# Patient Record
Sex: FEMALE | Race: WHITE | Employment: PART TIME | URBAN - METROPOLITAN AREA
[De-identification: names, ages, dates, MRNs, and addresses within clinical notes are randomized per-mention and may not be internally consistent; named-entity substitution may affect disease eponyms.]

---

## 2017-02-20 RX ORDER — BUTALBITAL, ACETAMINOPHEN AND CAFFEINE 50; 325; 40 MG/1; MG/1; MG/1
TABLET ORAL
Qty: 30 TAB | Refills: 0 | Status: SHIPPED | OUTPATIENT
Start: 2017-02-20 | End: 2017-03-14

## 2017-03-14 ENCOUNTER — OFFICE VISIT (OUTPATIENT)
Dept: NEUROLOGY | Age: 62
End: 2017-03-14

## 2017-03-14 VITALS
HEART RATE: 78 BPM | OXYGEN SATURATION: 98 % | BODY MASS INDEX: 34.93 KG/M2 | SYSTOLIC BLOOD PRESSURE: 110 MMHG | HEIGHT: 61 IN | WEIGHT: 185 LBS | DIASTOLIC BLOOD PRESSURE: 70 MMHG

## 2017-03-14 DIAGNOSIS — G43.719 INTRACTABLE CHRONIC MIGRAINE WITHOUT AURA AND WITHOUT STATUS MIGRAINOSUS: Primary | ICD-10-CM

## 2017-03-14 DIAGNOSIS — G40.219 PARTIAL EPILEPSY WITH IMPAIRMENT OF CONSCIOUSNESS, INTRACTABLE (HCC): ICD-10-CM

## 2017-03-14 RX ORDER — FLUTICASONE PROPIONATE 50 MCG
2 SPRAY, SUSPENSION (ML) NASAL DAILY
COMMUNITY
End: 2018-10-24

## 2017-03-14 RX ORDER — NORTRIPTYLINE HYDROCHLORIDE 25 MG/1
CAPSULE ORAL
Qty: 60 CAP | Refills: 1 | Status: SHIPPED | OUTPATIENT
Start: 2017-03-14 | End: 2017-07-11

## 2017-03-14 RX ORDER — DIVALPROEX SODIUM 250 MG/1
TABLET, DELAYED RELEASE ORAL
Qty: 150 TAB | Refills: 1 | Status: SHIPPED | OUTPATIENT
Start: 2017-03-14 | End: 2017-05-25 | Stop reason: SDUPTHER

## 2017-03-14 RX ORDER — BUTALBITAL, ACETAMINOPHEN AND CAFFEINE 50; 325; 40 MG/1; MG/1; MG/1
TABLET ORAL
Qty: 40 TAB | Refills: 1 | Status: SHIPPED | OUTPATIENT
Start: 2017-03-14 | End: 2017-07-11

## 2017-03-14 NOTE — MR AVS SNAPSHOT
Visit Information Date & Time Provider Department Dept. Phone Encounter #  
 3/14/2017 11:00 AM Paz Squires MD [facility] [phone] [id number] Upcoming Health Maintenance Date Due Hepatitis C Screening 1955 FOOT EXAM Q1 1/15/1964 MICROALBUMIN Q1 1/15/1964 EYE EXAM RETINAL OR DILATED Q1 1/15/1964 Pneumococcal 19-64 Medium Risk (1 of 1 - PPSV23) 1/15/1973 DTaP/Tdap/Td series (1 - Tdap) 1/15/1975 BREAST CANCER SCRN MAMMOGRAM 1/15/2004 ZOSTER VACCINE AGE 60> 1/15/2014 HEMOGLOBIN A1C Q6M 11/28/2014 LIPID PANEL Q1 1/16/2015 FOBT Q 1 YEAR AGE 50-75 1/21/2015 INFLUENZA AGE 9 TO ADULT 8/1/2016 PAP AKA CERVICAL CYTOLOGY 1/6/2017 Allergies as of 3/14/2017  Review Complete On: 3/14/2017 By: Sonia Gutierrez LPN Severity Noted Reaction Type Reactions Flexall [Menthol-aloe Vera Extract]  05/28/2014    Other (comments) Sudafed [Pseudoephedrine Hcl]  04/13/2011    Unknown (comments) Current Immunizations  Never Reviewed No immunizations on file. Not reviewed this visit You Were Diagnosed With   
  
 Codes Comments Intractable chronic migraine without aura and without status migrainosus    -  Primary ICD-10-CM: L70.021 ICD-9-CM: 346.71 Partial epilepsy with impairment of consciousness, intractable ([facility] 75.)     ICD-10-CM: V89.810 ICD-9-CM: 345.41 Vitals BP Pulse Height(growth percentile) Weight(growth percentile) SpO2 BMI  
 110/70 78 5' 1\" (1.549 m) 185 lb (83.9 kg) 98% 34.96 kg/m2 OB Status Smoking Status Postmenopausal Never Smoker Vitals History BMI and BSA Data Body Mass Index Body Surface Area 34.96 kg/m 2 1.9 m 2 Preferred Pharmacy Pharmacy Name Phone [facility] [address] [place] [address], [address] [place] [address] [phone] Your Updated Medication List  
  
   
 This list is accurate as of: 3/14/17 12:55 PM.  Always use your most recent med list.  
  
  
  
  
 ACTOS 15 mg tablet Generic drug:  pioglitazone Take  by mouth. 10 MG BID BENADRYL ALLERGY 25 mg tablet Generic drug:  diphenhydrAMINE Take 25 mg by mouth every six (6) hours as needed. butalbital-acetaminophen-caffeine -40 mg per tablet Commonly known as:  FIORICET, ESGIC  
30 day Rx. No early refill. Take 1-2 tabs at onset of migraine. May repeat one tab in 4 hours if needed. Limit: 3 days a week. CRESTOR PO Take  by mouth. divalproex  mg tablet Commonly known as:  DEPAKOTE  
TAKE 3 TABLETS BY MOUTH EVERY MORNING & 3 TABLETS EVERY EVENING  
  
 FLONASE 50 mcg/actuation nasal spray Generic drug:  fluticasone 2 Sprays by Both Nostrils route daily. gabapentin 300 mg capsule Commonly known as:  NEURONTIN Take 300 mg by mouth nightly. glipiZIDE 5 mg tablet Commonly known as:  [place] Take 10 mg by mouth two (2) times a day. HYDROcodone-acetaminophen 7.5-325 mg per tablet Commonly known as:  Vinny Songster Take  by mouth. JANUMET 50-1,000 mg per tablet Generic drug:  SITagliptin-metFORMIN Take 1 Tab by mouth two (2) times daily (with meals). nortriptyline 25 mg capsule Commonly known as:  PAMELOR Take 1 to 2 capsules at bedtime. Antidepressant for headache prevention and to help insomnia. PAXIL 20 mg tablet Generic drug:  PARoxetine Take  by mouth daily. * tiZANidine 4 mg tablet Commonly known as:  Marisela Mail Take 1 Tab by mouth three (3) times daily as needed. Indications: MUSCLE SPASM * tiZANidine 4 mg tablet Commonly known as:  Marisela Mail TAKE 1 TABLET BY MOUTH 3 TIMES A DAY FOR MUSCLE SPASMS  
  
 VITAMIN D3 1,000 unit tablet Generic drug:  cholecalciferol Take  by mouth daily. XANAX PO Take  by mouth. * Notice:   This list has 2 medication(s) that are the same as other medications prescribed for you. Read the directions carefully, and ask your doctor or other care provider to review them with you. Prescriptions Printed Refills  
 butalbital-acetaminophen-caffeine (FIORICET, ESGIC) -40 mg per tablet 1 Si day Rx. No early refill. Take 1-2 tabs at onset of migraine. May repeat one tab in 4 hours if needed. Limit: 3 days a week. Class: Print Prescriptions Sent to Pharmacy Refills  
 nortriptyline (PAMELOR) 25 mg capsule 1 Sig: Take 1 to 2 capsules at bedtime. Antidepressant for headache prevention and to help insomnia. Class: Normal  
 Pharmacy: [facility] [address] [place], [place] [address]y [address] [place] [address] E Ph #: [phone]  
 divalproex DR (DEPAKOTE) 250 mg tablet 1 Sig: TAKE 3 TABLETS BY MOUTH EVERY MORNING & 3 TABLETS EVERY EVENING Class: Normal  
 Pharmacy: [facility] [address] [place], [place] [address]y [address] [place] [address] E Ph #: [phone] Introducing [facility]! [facility] introduces [employer] patient portal. Now you can access parts of your medical record, email your doctor's office, and request medication refills online. 1. In your internet browser, go to [email or web address] 2. Click on the First Time User? Click Here link in the Sign In box. You will see the New Member Sign Up page. 3. Enter your [employer] Access Code exactly as it appears below. You will not need to use this code after youve completed the sign-up process. If you do not sign up before the expiration date, you must request a new code. · [employer] Access Code: [email or web address] Expires: 2017 12:55 PM 
 
4. Enter the last four digits of your Social Security Number (xxxx) and Date of Birth (mm/dd/yyyy) as indicated and click Submit. You will be taken to the next sign-up page. 5. Create a [employer] ID. This will be your [employer] login ID and cannot be changed, so think of one that is secure and easy to remember. 6. Create a [employer] password. You can change your password at any time. 7. Enter your Password Reset Question and Answer. This can be used at a later time if you forget your password. 8. Enter your e-mail address. You will receive e-mail notification when new information is available in [address] 9. Click Sign Up. You can now view and download portions of your medical record. 10. Click the Download Summary menu link to download a portable copy of your medical information. If you have questions, please visit the Frequently Asked Questions section of the [employer] website. Remember, [employer] is NOT to be used for urgent needs. For medical emergencies, dial 911. Now available from your iPhone and Android! Please provide this summary of care documentation to your next provider. Your primary care clinician is listed as Kim Fong. If you have any questions after today's visit, please call [phone].

## 2017-03-14 NOTE — PROGRESS NOTES
Interval HPI:   This is a 60 y.o. female who is following up for     Chief Complaint   Patient presents with    Seizure     a couple of months ago    Migraine     New patient to me. Complicated visit. Pt has been seeing Gonzalo Villarreal NP for what looks like more than a year but says she they have a personality conflict and she wants to go back to seeing Dr. Elvis Luque. He is not here today so I'm am seeing in his place. .      Patient previously followed by Gonzalo Villarreal NP for migraine, epilepsy, and muscle spasms and last seen by her in May of 2016 with same complaints. At that visit continued on Zanaflex for her muscle spasms and indirectly it improved her sleep. She has a self reported hx of seizures and at that visit she noted having had 3 \"small seizures\" since the prior visit in Nov 2015. They discussed increasing her Depakote to 1000 mg BID but patient declined due to concern of SEFx from depakote. Kept Depakote at 750 mg BID. Lastly, pt was complaining about frequent headaches and it was determined she was overusing her Fioricet and maybe some OTC analgesics as well. She was counseled to stop all OTC headache pain relievers, limit her Fioricet to 3 days a week, and started on Topamax (goal of 100 mg QHS) for both headache prevention and secondly to reduce chance of seizure. Today she reports that her last seizure was a few weeks ago, and describes it as being a \"small seizure. \"  She describes that seizure as waking up one morning, feeling  woozy, pounding in head, and flashing lights in her vision, feeling shaky in her arms (sugars not checked, is diabetic). Prior to that, the last seizure was 3 months before. She recalls that her  was driving and they were going through a wooded area and the lights were flickering through the trees so she had to close her eyes because it was bothering her.  is here and says he didn't see or notice any seizure-like activity.   She describes other episodes where she gets a pounding sensation inside her head and considers this a manifestation of seizure, but no actual/ associated jerking.  recalls one remote seizure where she was \"flopping like a fish. \"     Brief ROS: as above or otherwise negative  There have been no significant changes in PMHx, PSHx, SHx except as noted above. Allergies   Allergen Reactions    Flexall [Menthol-Aloe Vera Extract] Other (comments)    Sudafed [Pseudoephedrine Hcl] Unknown (comments)     Current Outpatient Prescriptions   Medication Sig Dispense Refill    fluticasone (FLONASE) 50 mcg/actuation nasal spray 2 Sprays by Both Nostrils route daily.  butalbital-acetaminophen-caffeine (FIORICET, ESGIC) -40 mg per tablet 30 day Rx. No early refill. Take 1-2 tabs at onset of migraine. May repeat one tab in 4 hours if needed. Limit: 3 days a week. 40 Tab 1    nortriptyline (PAMELOR) 25 mg capsule Take 1 to 2 capsules at bedtime. Antidepressant for headache prevention and to help insomnia. 60 Cap 1    divalproex DR (DEPAKOTE) 250 mg tablet TAKE 3 TABLETS BY MOUTH EVERY MORNING & 3 TABLETS EVERY EVENING 150 Tab 1    tiZANidine (ZANAFLEX) 4 mg tablet TAKE 1 TABLET BY MOUTH 3 TIMES A DAY FOR MUSCLE SPASMS 30 Tab 3    tiZANidine (ZANAFLEX) 4 mg tablet Take 1 Tab by mouth three (3) times daily as needed. Indications: MUSCLE SPASM 30 Tab 3    ROSUVASTATIN CALCIUM (CRESTOR PO) Take  by mouth.  PARoxetine (PAXIL) 20 mg tablet Take  by mouth daily.  ALPRAZOLAM (XANAX PO) Take  by mouth.  HYDROcodone-acetaminophen (NORCO) 7.5-325 mg per tablet Take  by mouth.  gabapentin (NEURONTIN) 300 mg capsule Take 300 mg by mouth nightly.  diphenhydrAMINE (BENADRYL ALLERGY) 25 mg tablet Take 25 mg by mouth every six (6) hours as needed.  sitaGLIPtin-metFORMIN (JANUMET) 50-1,000 mg per tablet Take 1 Tab by mouth two (2) times daily (with meals).         pioglitazone (ACTOS) 15 mg tablet Take by mouth. 10 MG BID       glipiZIDE (GLUCOTROL) 5 mg tablet Take 10 mg by mouth two (2) times a day.  cholecalciferol, vitamin d3, (VITAMIN D) 1,000 unit tablet Take  by mouth daily. Physical Exam  Blood pressure 110/70, pulse 78, height 5' 1\" (1.549 m), weight 83.9 kg (185 lb), SpO2 98 %. No acute distress  Neck: no stiffness  Skin: no rashes    Focused Neurological Exam     Mental status: Alert and oriented to person, place situation. Language: normal fluency and comprehension; no dysarthria. CNs:   Visual fields grossly normal  Extraocular movements intact, no nystagmus  Face appears symmetric and facial strength normal.    Hearing is intact to casual conversation. Sensory: intact light touch both arms  Motor: Normal bulk and strength in all 4 extremities. Reflexes: not tested  Gait: not observed    Impression      ICD-10-CM ICD-9-CM    1. Intractable chronic migraine without aura and without status migrainosus G43.719 346.71 butalbital-acetaminophen-caffeine (FIORICET, ESGIC) -40 mg per tablet      nortriptyline (PAMELOR) 25 mg capsule      divalproex DR (DEPAKOTE) 250 mg tablet   2. Partial epilepsy with impairment of consciousness, intractable (HCC) G40.219 345.41        Renewed Depakote 750 mg BID and Fioricet Rx (#40, 1 RF) with enough to last patient until she follows up with Dr. Dimitris Rod. Added Nortriptyline to see if it reduces overall number of headaches. Discussed common SEFx with patient and . Discussed with patient/  that the things she described as seizures, don't sound like seizure to me at all. They sound variably like migraine with aura with other episodes of photo-sensitivity (without any associated seizure). Patient recalls being told something in the past by one of her doctors that some of her seizure-like episodes may be stress related, though it's not clear whether she ever had a seizure while being recorded with EEG.  She had a routine EEG in 3-2012, read by Dr. Mikhail Zayas as being normal awake, drowsy, asleep EEG. He suggested that if she continued having spells, she should have have a sleep deprived EEG, 24 hour ambulatory EEG, or EMU admission to evaluate for the nature of her spells. Patient will follow up with Dr. Mikhail Zayas in about 6 weeks and further discuss evaluation of spells, and her headaches. Spent more than 50% of this visit reviewing history, clarifying symptomatology, and discussing treatment plans with patient and .

## 2017-04-25 RX ORDER — TIZANIDINE 4 MG/1
TABLET ORAL
Qty: 30 TAB | Refills: 0 | Status: SHIPPED | OUTPATIENT
Start: 2017-04-25 | End: 2017-05-02

## 2017-05-02 ENCOUNTER — OFFICE VISIT (OUTPATIENT)
Dept: NEUROLOGY | Age: 62
End: 2017-05-02

## 2017-05-02 VITALS
RESPIRATION RATE: 15 BRPM | HEIGHT: 61 IN | DIASTOLIC BLOOD PRESSURE: 78 MMHG | SYSTOLIC BLOOD PRESSURE: 120 MMHG | OXYGEN SATURATION: 98 % | TEMPERATURE: 98 F | HEART RATE: 70 BPM

## 2017-05-02 DIAGNOSIS — G43.019 INTRACTABLE MIGRAINE WITHOUT AURA AND WITHOUT STATUS MIGRAINOSUS: ICD-10-CM

## 2017-05-02 DIAGNOSIS — T39.95XA ANALGESIC OVERUSE HEADACHE: Primary | ICD-10-CM

## 2017-05-02 DIAGNOSIS — G44.40 ANALGESIC OVERUSE HEADACHE: Primary | ICD-10-CM

## 2017-05-02 NOTE — MR AVS SNAPSHOT
Visit Information Date & Time Provider Department Dept. Phone Encounter #  
 5/2/2017  3:00 PM Nica Hodgson MD [address] [place] [address] Neurology Clinic [phone] [id number] Follow-up Instructions Return in about 9 weeks (around 7/4/2017). Upcoming Health Maintenance Date Due Hepatitis C Screening 1955 FOOT EXAM Q1 1/15/1964 MICROALBUMIN Q1 1/15/1964 EYE EXAM RETINAL OR DILATED Q1 1/15/1964 Pneumococcal 19-64 Medium Risk (1 of 1 - PPSV23) 1/15/1973 DTaP/Tdap/Td series (1 - Tdap) 1/15/1975 BREAST CANCER SCRN MAMMOGRAM 1/15/2004 ZOSTER VACCINE AGE 60> 1/15/2014 HEMOGLOBIN A1C Q6M 11/28/2014 LIPID PANEL Q1 1/16/2015 FOBT Q 1 YEAR AGE 50-75 1/21/2015 PAP AKA CERVICAL CYTOLOGY 1/6/2017 INFLUENZA AGE 9 TO ADULT 8/1/2017 Allergies as of 5/2/2017  Review Complete On: 5/2/2017 By: Chaparrita Polk LPN Severity Noted Reaction Type Reactions Flexall [Menthol-aloe Vera Extract]  05/28/2014    Other (comments) Sudafed [Pseudoephedrine Hcl]  04/13/2011    Unknown (comments) Current Immunizations  Never Reviewed No immunizations on file. Not reviewed this visit Vitals BP Pulse Temp Resp Height(growth percentile) SpO2  
 120/78 70 98 °F (36.7 °C) 15 5' 1\" (1.549 m) 98% OB Status Smoking Status Postmenopausal Never Smoker Preferred Pharmacy Pharmacy Name Phone [facility] [address] [place] [address], [address] [place] [address] [phone] Your Updated Medication List  
  
   
This list is accurate as of: 5/2/17  3:48 PM.  Always use your most recent med list.  
  
  
  
  
 ACTOS 15 mg tablet Generic drug:  pioglitazone Take  by mouth. 10 MG BID BENADRYL ALLERGY 25 mg tablet Generic drug:  diphenhydrAMINE Take 25 mg by mouth every six (6) hours as needed. butalbital-acetaminophen-caffeine -40 mg per tablet Commonly known as:  FIORICET, ESGIC  
30 day Rx. No early refill. Take 1-2 tabs at onset of migraine. May repeat one tab in 4 hours if needed. Limit: 3 days a week. CRESTOR PO Take  by mouth. divalproex  mg tablet Commonly known as:  DEPAKOTE  
TAKE 3 TABLETS BY MOUTH EVERY MORNING & 3 TABLETS EVERY EVENING  
  
 FLONASE 50 mcg/actuation nasal spray Generic drug:  fluticasone 2 Sprays by Both Nostrils route daily. gabapentin 300 mg capsule Commonly known as:  NEURONTIN Take 300 mg by mouth nightly. glipiZIDE 5 mg tablet Commonly known as:  Lakshmi Mulch Take 10 mg by mouth two (2) times a day. HYDROcodone-acetaminophen 7.5-325 mg per tablet Commonly known as:  Olamide Proper Take  by mouth. JANUMET 50-1,000 mg per tablet Generic drug:  SITagliptin-metFORMIN Take 1 Tab by mouth two (2) times daily (with meals). nortriptyline 25 mg capsule Commonly known as:  PAMELOR Take 1 to 2 capsules at bedtime. Antidepressant for headache prevention and to help insomnia. PAXIL 20 mg tablet Generic drug:  PARoxetine Take  by mouth daily. tiZANidine 4 mg tablet Commonly known as:  Pedro Heading Take 1 Tab by mouth three (3) times daily as needed. Indications: MUSCLE SPASM  
  
 VITAMIN D3 1,000 unit tablet Generic drug:  cholecalciferol Take  by mouth daily. XANAX PO Take  by mouth. Follow-up Instructions Return in about 9 weeks (around 7/4/2017). Patient Instructions Information Regarding Testing If you have physican order for a test or a medication denied by your insurance company, this does not mean the test or medication is not appropriate for you as that is a medical decision, not a decision to be made by an insurance company representative or by an [facility] physician who has not interviewed and examined you. This is a decision to be made between you and your physician. The denial of services is a contractual matter between you and your insurance company, not an issue between your physician and the insurance company. If your test or medication is denied, you can take the following steps to help resolve the issue: 1. File a complaint with the [facility] of Insurance regarding your insurance company's denial of services ordered for you. You can do this either by calling them directly or by completing an on-line complaint form on the [employer]. This can be found at [email or web address] 2. Also file a formal complaint with your insurance company and ask to have the name of the person denying the service so that you may explore a legal option should you be harmed by this denial of service. Again, the fact the insurance company will not pay for the service does not mean it is not medically necessary and I would encourage you to follow through with the plan that was made with your physician 3. File a written complaint with your employer so your employer and benefit manager is aware of the poor coverage they are providing their employees. If you have medicare/medicaid, complain to your representative in the House and to your Jose Pollock. PRESCRIPTION REFILL POLICY [facility] Statement to Patients April 1, 2014 In an effort to ensure the large volume of patient prescription refills is processed in the most efficient and expeditious manner, we are asking our patients to assist us by calling your Pharmacy for all prescription refills, this will include also your  Mail Order Pharmacy. The pharmacy will contact our office electronically to continue the refill process. Please do not wait until the last minute to call your pharmacy. We need at least 48 hours (2days) to fill prescriptions. We also encourage you to call your pharmacy before going to  your prescription to make sure it is ready. With regard to controlled substance prescription refill requests (narcotic refills) that need to be picked up at our office, we ask your cooperation by providing us with at least 72 hours (3days) notice that you will need a refill. We will not refill narcotic prescription refill requests after 4:00pm on any weekday, Monday through Thursday, or after 2:00pm on Fridays, or on the weekends. We encourage everyone to explore another way of getting your prescription refill request processed using [facility], our patient web portal through our electronic medical record system. [facility] is an efficient and effective way to communicate your medication request directly to the office and  downloadable as an otf on your smart phone . [facility] also features a review functionality that allows you to view your medication list as well as leave messages for your physician. Are you ready to get connected? If so please review the attatched instructions or speak to any of our staff to get you set up right away! Thank you so much for your cooperation. Should you have any questions please contact our Practice Administrator. The Physicians and Staff,  [facility] If we have ordered testing for you, we do not call patients with results and we do not give test results over the phone. We schedule follow up appointments so that your results can be discussed in person and any questions you have regarding them may be addressed. If something of concern is revealed on your test, we will call you for a sooner follow up appointment. Additionally, results may be found by using the My Chart feature and one of our patient service representatives at the  can give you instructions on how to access this feature of our electronic medical record system. Jonathan Lion [address] What is a living will?  
A living will is a legal form you use to write down the kind of care you want at the end of your life. It is used by the health professionals who will treat you if you aren't able to decide for yourself. If you put your wishes in writing, your loved ones and others will know what kind of care you want. They won't need to guess. This can ease your mind and be helpful to others. A living will is not the same as an estate or property will. An estate will explains what you want to happen with your money and property after you die. Is a living will a legal document? A living will is a legal document. Each state has its own laws about living grewal. If you move to another state, make sure that your living will is legal in the state where you now live. Or you might use a universal form that has been approved by many states. This kind of form can sometimes be completed and stored online. Your electronic copy will then be available wherever you have a connection to the Internet. In most cases, doctors will respect your wishes even if you have a form from a different state. · You don't need an  to complete a living will. But legal advice can be helpful if your state's laws are unclear, your health history is complicated, or your family can't agree on what should be in your living will. · You can change your living will at any time. Some people find that their wishes about end-of-life care change as their health changes. · In addition to making a living will, think about completing a medical power of  form. This form lets you name the person you want to make end-of-life treatment decisions for you (your \"health care agent\") if you're not able to. Many hospitals and nursing homes will give you the forms you need to complete a living will and a medical power of . · Your living will is used only if you can't make or communicate decisions for yourself anymore.  If you become able to make decisions again, you can accept or refuse any treatment, no matter what you wrote in your living will. · Your state may offer an online registry. This is a place where you can store your living will online so the doctors and nurses who need to treat you can find it right away. What should you think about when creating a living will? Talk about your end-of-life wishes with your family members and your doctor. Let them know what you want. That way the people making decisions for you won't be surprised by your choices. Think about these questions as you make your living will: · Do you know enough about life support methods that might be used? If not, talk to your doctor so you know what might be done if you can't breathe on your own, your heart stops, or you're unable to swallow. · What things would you still want to be able to do after you receive life-support methods? Would you want to be able to walk? To speak? To eat on your own? To live without the help of machines? · If you have a choice, where do you want to be cared for? In your home? At a hospital or nursing home? · Do you want certain [facility] practices performed if you become very ill? · If you have a choice at the end of your life, where would you prefer to die? At home? In a hospital or nursing home? Somewhere else? · Would you prefer to be buried or cremated? · Do you want your organs to be donated after you die? What should you do with your living will? · Make sure that your family members and your health care agent have copies of your living will. · Give your doctor a copy of your living will to keep in your medical record. If you have more than one doctor, make sure that each one has a copy. · You may want to put a copy of your living will where it can be easily found. Where can you learn more? Go to http://soren-surjit.info/. Enter G547 in the search box to learn more about \"Learning About Living Perroy. \" 
 Current as of: February 24, 2016 Content Version: 11.2 © 3241-2805 [employer]. Care instructions adapted under license by [employer] (which disclaims liability or warranty for this information). If you have questions about a medical condition or this instruction, always ask your healthcare professional. [address] any warranty or liability for your use of this information. Do not use any analgesic medication to treat the headache for 8 weeks. Your headaches are likely to get worse before they get better. Continue using the Depakote and start taking the nortriptyline. Take your medications at the same time each day. Follow-up in 9 weeks. Introducing [facility]! Sapna Ayala introduces [facility] patient portal. Now you can access parts of your medical record, email your doctor's office, and request medication refills online. 1. In your internet browser, go to [email or web address] 2. Click on the First Time User? Click Here link in the Sign In box. You will see the New Member Sign Up page. 3. Enter your [facility] Access Code exactly as it appears below. You will not need to use this code after youve completed the sign-up process. If you do not sign up before the expiration date, you must request a new code. · [facility] Access Code: [email or web address] Expires: 6/12/2017 12:55 PM 
 
4. Enter the last four digits of your Social Security Number (xxxx) and Date of Birth (mm/dd/yyyy) as indicated and click Submit. You will be taken to the next sign-up page. 5. Create a Naikut ID. This will be your [facility]it login ID and cannot be changed, so think of one that is secure and easy to remember. 6. Create a [facility] password. You can change your password at any time. 7. Enter your Password Reset Question and Answer. This can be used at a later time if you forget your password. 8. Enter your e-mail address. You will receive e-mail notification when new information is available in [address] 9. Click Sign Up. You can now view and download portions of your medical record. 10. Click the Download Summary menu link to download a portable copy of your medical information. If you have questions, please visit the Frequently Asked Questions section of the [facility] website. Remember, [facility] is NOT to be used for urgent needs. For medical emergencies, dial 911. Now available from your iPhone and Android! Please provide this summary of care documentation to your next provider. Your primary care clinician is listed as Lexi Melchor. If you have any questions after today's visit, please call [phone].

## 2017-05-02 NOTE — PROGRESS NOTES
[facility]. [address]   [address], [address] [place] [address] Drive    [facility], [place]    Fax               Chief Complaint   Patient presents with    Migraine     follow up     Current Outpatient Prescriptions   Medication Sig Dispense Refill    fluticasone (FLONASE) 50 mcg/actuation nasal spray 2 Sprays by Both Nostrils route daily.  butalbital-acetaminophen-caffeine (FIORICET, ESGIC) -40 mg per tablet 30 day Rx. No early refill. Take 1-2 tabs at onset of migraine. May repeat one tab in 4 hours if needed. Limit: 3 days a week. 40 Tab 1    nortriptyline (PAMELOR) 25 mg capsule Take 1 to 2 capsules at bedtime. Antidepressant for headache prevention and to help insomnia. 60 Cap 1    divalproex DR (DEPAKOTE) 250 mg tablet TAKE 3 TABLETS BY MOUTH EVERY MORNING & 3 TABLETS EVERY EVENING 150 Tab 1    tiZANidine (ZANAFLEX) 4 mg tablet Take 1 Tab by mouth three (3) times daily as needed. Indications: MUSCLE SPASM 30 Tab 3    ROSUVASTATIN CALCIUM (CRESTOR PO) Take  by mouth.  PARoxetine (PAXIL) 20 mg tablet Take  by mouth daily.  ALPRAZOLAM (XANAX PO) Take  by mouth.  HYDROcodone-acetaminophen (NORCO) 7.5-325 mg per tablet Take  by mouth.  gabapentin (NEURONTIN) 300 mg capsule Take 300 mg by mouth nightly.  diphenhydrAMINE (BENADRYL ALLERGY) 25 mg tablet Take 25 mg by mouth every six (6) hours as needed.  sitaGLIPtin-metFORMIN (JANUMET) 50-1,000 mg per tablet Take 1 Tab by mouth two (2) times daily (with meals).  pioglitazone (ACTOS) 15 mg tablet Take  by mouth. 10 MG BID       glipiZIDE (GLUCOTROL) 5 mg tablet Take 10 mg by mouth two (2) times a day.  cholecalciferol, vitamin d3, (VITAMIN D) 1,000 unit tablet Take  by mouth daily.  tiZANidine (ZANAFLEX) 4 mg tablet TAKE 1 TABLET THREE TIMES DAILY AS NEEDED FOR MUSCLE SPASMS 30 Tab 0    medications corrected in the electronic medical record.   She is not taking Zanaflex. Allergies   Allergen Reactions    Flexall [Menthol-Aloe Vera Extract] Other (comments)    Sudafed [Pseudoephedrine Hcl] Unknown (comments)     Social History   Substance Use Topics    Smoking status: Never Smoker    Smokeless tobacco: Never Used    Alcohol use No     Patient returns today for follow-up migraines as well as spells of the been billed seizure. I have reviewed Dr. Breann Raza note where he saw her back in March. He started Pamelor for headaches. She continues on the Depakote 750 mg twice daily. She is also using Fioricet. She is overusing analgesics. We discussed this today at length. Examination  Visit Vitals    /78    Pulse 70    Temp 98 °F (36.7 °C)    Resp 15    Ht 5' 1\" (1.549 m)    SpO2 98%     Awake, alert and oriented. No icterus. CN intact 2-12 without nystagmus. No pronation or drift. Resists fully in all 4 extrems. DTR symmetric in all 4 extremities. No ataxia. Steady gait. Impression/Plan  Underlying migraine headache now with analgesic overuse headache. Discussed this at length. Discussed she needs to stop all analgesic medications whether prescribed or over-the-counter that includes the Fioricet. Her headaches will get worse before they get better. He can take 8-10 weeks for this cycle to break. Continue Depakote. Continue Pamelor. Follow-up in about 10 weeks. This note was created using voice recognition software. Despite editing, there may be syntax errors. This note will not be viewable in [address]

## 2017-05-02 NOTE — PATIENT INSTRUCTIONS
Information Regarding Testing     If you have physican order for a test or a medication denied by your insurance company, this does not mean the test or medication is not appropriate for you as that is a medical decision, not a decision to be made by an insurance company representative or by an [facility] physician who has not interviewed and examined you. This is a decision to be made between you and your physician. The denial of services is a contractual matter between you and your insurance company, not an issue between your physician and the insurance company. If your test or medication is denied, you can take the following steps to help resolve the issue:    1. File a complaint with the [facility] regarding your insurance company's denial of services ordered for you. You can do this either by calling them directly or by completing an on-line complaint form on the [employer]. This can be found at www.[email or web address]    2. Also file a formal complaint with your insurance company and ask to have the name of the person denying the service so that you may explore a legal option should you be harmed by this denial of service. Again, the fact the insurance company will not pay for the service does not mean it is not medically necessary and I would encourage you to follow through with the plan that was made with your physician    3. File a written complaint with your employer so your employer and benefit manager is aware of the poor coverage they are providing their employees. If you have medicare/medicaid, complain to your representative in the House and to your Jose Pollock.         [facility]   Statement to Patients  April 1, 2014      In an effort to ensure the large volume of patient prescription refills is processed in the most efficient and expeditious manner, we are asking our patients to assist us by calling your Pharmacy for all prescription refills, this will include also your  Mail Order Pharmacy. The pharmacy will contact our office electronically to continue the refill process. Please do not wait until the last minute to call your pharmacy. We need at least 48 hours (2days) to fill prescriptions. We also encourage you to call your pharmacy before going to  your prescription to make sure it is ready. With regard to controlled substance prescription refill requests (narcotic refills) that need to be picked up at our office, we ask your cooperation by providing us with at least 72 hours (3days) notice that you will need a refill. We will not refill narcotic prescription refill requests after 4:00pm on any weekday, Monday through Thursday, or after 2:00pm on Fridays, or on the weekends. We encourage everyone to explore another way of getting your prescription refill request processed using [employer], our patient web portal through our electronic medical record system. [employer] is an efficient and effective way to communicate your medication request directly to the office and  downloadable as an otf on your smart phone . [employer] also features a review functionality that allows you to view your medication list as well as leave messages for your physician. Are you ready to get connected? If so please review the attatched instructions or speak to any of our staff to get you set up right away! Thank you so much for your cooperation. Should you have any questions please contact our Practice Administrator. The Physicians and Staff,  [facility]       If we have ordered testing for you, we do not call patients with results and we do not give test results over the phone. We schedule follow up appointments so that your results can be discussed in person and any questions you have regarding them may be addressed.   If something of concern is revealed on your test, we will call you for a sooner follow up appointment. Additionally, results may be found by using the My Chart feature and one of our patient service representatives at the  can give you instructions on how to access this feature of our electronic medical record system. Learning About Chris Ballard  What is a living will? A living will is a legal form you use to write down the kind of care you want at the end of your life. It is used by the health professionals who will treat you if you aren't able to decide for yourself. If you put your wishes in writing, your loved ones and others will know what kind of care you want. They won't need to guess. This can ease your mind and be helpful to others. A living will is not the same as an estate or property will. An estate will explains what you want to happen with your money and property after you die. Is a living will a legal document? A living will is a legal document. Each state has its own laws about living grewal. If you move to another state, make sure that your living will is legal in the state where you now live. Or you might use a universal form that has been approved by many states. This kind of form can sometimes be completed and stored online. Your electronic copy will then be available wherever you have a connection to the Internet. In most cases, doctors will respect your wishes even if you have a form from a different state. · You don't need an  to complete a living will. But legal advice can be helpful if your state's laws are unclear, your health history is complicated, or your family can't agree on what should be in your living will. · You can change your living will at any time. Some people find that their wishes about end-of-life care change as their health changes. · In addition to making a living will, think about completing a medical power of  form.  This form lets you name the person you want to make end-of-life treatment decisions for you (your \"health care agent\") if you're not able to. Many hospitals and nursing homes will give you the forms you need to complete a living will and a medical power of . · Your living will is used only if you can't make or communicate decisions for yourself anymore. If you become able to make decisions again, you can accept or refuse any treatment, no matter what you wrote in your living will. · Your state may offer an online registry. This is a place where you can store your living will online so the doctors and nurses who need to treat you can find it right away. What should you think about when creating a living will? Talk about your end-of-life wishes with your family members and your doctor. Let them know what you want. That way the people making decisions for you won't be surprised by your choices. Think about these questions as you make your living will:  · Do you know enough about life support methods that might be used? If not, talk to your doctor so you know what might be done if you can't breathe on your own, your heart stops, or you're unable to swallow. · What things would you still want to be able to do after you receive life-support methods? Would you want to be able to walk? To speak? To eat on your own? To live without the help of machines? · If you have a choice, where do you want to be cared for? In your home? At a hospital or nursing home? · Do you want certain [employer] practices performed if you become very ill? · If you have a choice at the end of your life, where would you prefer to die? At home? In a hospital or nursing home? Somewhere else? · Would you prefer to be buried or cremated? · Do you want your organs to be donated after you die? What should you do with your living will? · Make sure that your family members and your health care agent have copies of your living will. · Give your doctor a copy of your living will to keep in your medical record.  If you have more than one doctor, make sure that each one has a copy. · You may want to put a copy of your living will where it can be easily found. Where can you learn more? Go to http://soren-surjit.info/. Enter C184 in the search box to learn more about \"Learning About Living Perrokatharine. \"  Current as of: February 24, 2016  Content Version: 11.2  © 9997-9326 [employer]. Care instructions adapted under license by [employer] (which disclaims liability or warranty for this information). If you have questions about a medical condition or this instruction, always ask your healthcare professional. [address] any warranty or liability for your use of this information. Do not use any analgesic medication to treat the headache for 8 weeks. Your headaches are likely to get worse before they get better. Continue using the Depakote and start taking the nortriptyline. Take your medications at the same time each day. Follow-up in 9 weeks.

## 2017-05-25 DIAGNOSIS — G43.719 INTRACTABLE CHRONIC MIGRAINE WITHOUT AURA AND WITHOUT STATUS MIGRAINOSUS: ICD-10-CM

## 2017-05-26 RX ORDER — DIVALPROEX SODIUM 250 MG/1
TABLET, DELAYED RELEASE ORAL
Qty: 150 TAB | Refills: 1 | Status: SHIPPED | OUTPATIENT
Start: 2017-05-26 | End: 2017-09-13 | Stop reason: SDUPTHER

## 2017-05-31 ENCOUNTER — TELEPHONE (OUTPATIENT)
Dept: NEUROLOGY | Age: 62
End: 2017-05-31

## 2017-05-31 DIAGNOSIS — G43.809 CERVICOGENIC MIGRAINE: ICD-10-CM

## 2017-05-31 DIAGNOSIS — M62.838 MUSCLE SPASM: ICD-10-CM

## 2017-05-31 NOTE — TELEPHONE ENCOUNTER
----- Message from [facility] sent at 5/31/2017 10:23 AM EDT -----  Regarding: Dr. Jenny Hoover  Pt requesting a refill on Rx \"Tizanidine\" (4mg). The medication states no refills, authorization required. Pt uses [facility] ([phone]). Pt best contact [phone] or [phone].

## 2017-06-01 RX ORDER — TIZANIDINE 4 MG/1
4 TABLET ORAL
Qty: 30 TAB | Refills: 3 | Status: SHIPPED | OUTPATIENT
Start: 2017-06-01 | End: 2017-10-24 | Stop reason: SDUPTHER

## 2017-07-11 ENCOUNTER — OFFICE VISIT (OUTPATIENT)
Dept: NEUROLOGY | Age: 62
End: 2017-07-11

## 2017-07-11 VITALS
OXYGEN SATURATION: 97 % | HEART RATE: 80 BPM | HEIGHT: 61 IN | DIASTOLIC BLOOD PRESSURE: 76 MMHG | RESPIRATION RATE: 18 BRPM | WEIGHT: 176 LBS | SYSTOLIC BLOOD PRESSURE: 118 MMHG | BODY MASS INDEX: 33.23 KG/M2 | TEMPERATURE: 98.6 F

## 2017-07-11 DIAGNOSIS — G43.019 INTRACTABLE MIGRAINE WITHOUT AURA AND WITHOUT STATUS MIGRAINOSUS: Primary | ICD-10-CM

## 2017-07-11 DIAGNOSIS — G40.219 PARTIAL EPILEPSY WITH IMPAIRMENT OF CONSCIOUSNESS, INTRACTABLE (HCC): ICD-10-CM

## 2017-07-11 NOTE — PROGRESS NOTES
Follow up for migraine and seizure. reports one seizure since last visit. Reports 5-8 headache days per months lasting 4 hours or more. Patient seeing pain management for neck pain from fall.

## 2017-07-11 NOTE — PATIENT INSTRUCTIONS
Start taking magnesium oxide 400 mg tablets 1 tablet twice daily  Start taking vitamin B2 400mg daily

## 2017-07-11 NOTE — PROGRESS NOTES
[facility]   [place] [address]   [address]    [facility]   [phone] Fax               Chief Complaint   Patient presents with    Migraine     follow up     Current Outpatient Prescriptions   Medication Sig Dispense Refill    tiZANidine (ZANAFLEX) 4 mg tablet Take 1 Tab by mouth three (3) times daily as needed. Indications: MUSCLE SPASM 30 Tab 3    divalproex DR (DEPAKOTE) 250 mg tablet TAKE 3 TABLETS BY MOUTH EVERY MORNING & 3 TABLETS EVERY EVENING 150 Tab 1    fluticasone (FLONASE) 50 mcg/actuation nasal spray 2 Sprays by Both Nostrils route daily.  ROSUVASTATIN CALCIUM (CRESTOR PO) Take  by mouth.  PARoxetine (PAXIL) 20 mg tablet Take  by mouth daily.  ALPRAZOLAM (XANAX PO) Take  by mouth.  gabapentin (NEURONTIN) 300 mg capsule Take 300 mg by mouth nightly.  diphenhydrAMINE (BENADRYL ALLERGY) 25 mg tablet Take 25 mg by mouth every six (6) hours as needed.  sitaGLIPtin-metFORMIN (JANUMET) 50-1,000 mg per tablet Take 1 Tab by mouth two (2) times daily (with meals).  pioglitazone (ACTOS) 15 mg tablet Take  by mouth. 10 MG BID       glipiZIDE (GLUCOTROL) 5 mg tablet Take 10 mg by mouth two (2) times a day.  cholecalciferol, vitamin d3, (VITAMIN D) 1,000 unit tablet Take  by mouth daily.  HYDROcodone-acetaminophen (NORCO) 7.5-325 mg per tablet Take  by mouth. Allergies   Allergen Reactions    Flexall [Menthol-Aloe Vera Extract] Other (comments)    Sudafed [Pseudoephedrine Hcl] Unknown (comments)     Social History   Substance Use Topics    Smoking status: Never Smoker    Smokeless tobacco: Never Used    Alcohol use No     Patient returns today for follow-up. She is for chronic headaches as well as episodes billed seizure. She has chronic neck pain and is being seen by pain management.   Last time I saw her she was having daily headaches, analgesic overuse and she was advised to stop all analgesic medications. She is off all analgesics now. She is reporting Patrick Noblia 1122 May, 5 in June and 2 in July thus far. She maintains compliance with her Depakote. No seizures. No occult seizure symptom. Endorses no side effects from the Depakote. Examination  Visit Vitals    /76    Pulse 80    Temp 98.6 °F (37 °C)    Resp 18    Ht 5' 1\" (1.549 m)    Wt 79.8 kg (176 lb)    SpO2 97%    BMI 33.25 kg/m2     Awake alert oriented. Normal speech and language. No icterus. No edema. No nystagmus. No pronation or drift. Impression/Plan  Analgesic overuse headache with underlying migraine  Stay of analgesics to prevent her getting back into analgesic overuse cycling  Mag oxide 400 mg twice daily as well as vitamin B2 for headache prevention  Continue to track headaches  Continue to work with pain management for her neck pain    Continue Depakote    Follow-up in about 3 months        This note was created using voice recognition software. Despite editing, there may be syntax errors. This note will not be viewable in [address]

## 2017-07-11 NOTE — MR AVS SNAPSHOT
Visit Information Date & Time Provider Department Dept. Phone Encounter #  
 7/11/2017  2:00 PM Miguelangel Muñoz MD [address] [place] [address] Neurology Clinic [phone] [id number] Follow-up Instructions Return in about 3 months (around 10/11/2017). Upcoming Health Maintenance Date Due Hepatitis C Screening 1955 FOOT EXAM Q1 1/15/1964 MICROALBUMIN Q1 1/15/1964 EYE EXAM RETINAL OR DILATED Q1 1/15/1964 Pneumococcal 19-64 Medium Risk (1 of 1 - PPSV23) 1/15/1973 DTaP/Tdap/Td series (1 - Tdap) 1/15/1975 BREAST CANCER SCRN MAMMOGRAM 1/15/2004 ZOSTER VACCINE AGE 60> 1/15/2014 HEMOGLOBIN A1C Q6M 11/28/2014 LIPID PANEL Q1 1/16/2015 FOBT Q 1 YEAR AGE 50-75 1/21/2015 PAP AKA CERVICAL CYTOLOGY 1/6/2017 INFLUENZA AGE 9 TO ADULT 8/1/2017 Allergies as of 7/11/2017  Review Complete On: 7/11/2017 By: Miguelangel Muñoz MD  
  
 Severity Noted Reaction Type Reactions Flexall [Menthol-aloe Vera Extract]  05/28/2014    Other (comments) Sudafed [Pseudoephedrine Hcl]  04/13/2011    Unknown (comments) Current Immunizations  Never Reviewed No immunizations on file. Not reviewed this visit Vitals BP Pulse Temp Resp Height(growth percentile) Weight(growth percentile) 118/76 80 98.6 °F (37 °C) 18 5' 1\" (1.549 m) 176 lb (79.8 kg) SpO2 BMI OB Status Smoking Status 97% 33.25 kg/m2 Postmenopausal Never Smoker Vitals History BMI and BSA Data Body Mass Index Body Surface Area  
 33.25 kg/m 2 1.85 m 2 Preferred Pharmacy Pharmacy Name Phone [facility] [address] [place] [address], [address] [place] [address] [phone] Your Updated Medication List  
  
   
This list is accurate as of: 7/11/17  3:52 PM.  Always use your most recent med list.  
  
  
  
  
 ACTOS 15 mg tablet Generic drug:  pioglitazone Take  by mouth. 10 MG BID BENADRYL ALLERGY 25 mg tablet Generic drug:  diphenhydrAMINE Take 25 mg by mouth every six (6) hours as needed. CRESTOR PO Take  by mouth. divalproex  mg tablet Commonly known as:  DEPAKOTE  
TAKE 3 TABLETS BY MOUTH EVERY MORNING & 3 TABLETS EVERY EVENING  
  
 FLONASE 50 mcg/actuation nasal spray Generic drug:  fluticasone 2 Sprays by Both Nostrils route daily. gabapentin 300 mg capsule Commonly known as:  NEURONTIN Take 300 mg by mouth nightly. glipiZIDE 5 mg tablet Commonly known as:  Aldean Jeannine Take 10 mg by mouth two (2) times a day. HYDROcodone-acetaminophen 7.5-325 mg per tablet Commonly known as:  Michael Plum Take  by mouth. JANUMET 50-1,000 mg per tablet Generic drug:  SITagliptin-metFORMIN Take 1 Tab by mouth two (2) times daily (with meals). PAXIL 20 mg tablet Generic drug:  PARoxetine Take  by mouth daily. tiZANidine 4 mg tablet Commonly known as:  Mayen Husk Take 1 Tab by mouth three (3) times daily as needed. Indications: MUSCLE SPASM  
  
 VITAMIN D3 1,000 unit tablet Generic drug:  cholecalciferol Take  by mouth daily. XANAX PO Take  by mouth. Follow-up Instructions Return in about 3 months (around 10/11/2017). Patient Instructions Start taking magnesium oxide 400 mg tablets 1 tablet twice daily Start taking vitamin B2 400mg daily Introducing [facility]! [facility] introduces [employer] patient portal. Now you can access parts of your medical record, email your doctor's office, and request medication refills online. 1. In your internet browser, go to [email or web address] 2. Click on the First Time User? Click Here link in the Sign In box. You will see the New Member Sign Up page. 3. Enter your [employer] Access Code exactly as it appears below. You will not need to use this code after youve completed the sign-up process.  If you do not sign up before the expiration date, you must request a new code. · [employer] Access Code: [email or web address] Expires: 10/9/2017  3:51 PM 
 
4. Enter the last four digits of your Social Security Number (xxxx) and Date of Birth (mm/dd/yyyy) as indicated and click Submit. You will be taken to the next sign-up page. 5. Create a [employer] ID. This will be your [employer] login ID and cannot be changed, so think of one that is secure and easy to remember. 6. Create a [employer] password. You can change your password at any time. 7. Enter your Password Reset Question and Answer. This can be used at a later time if you forget your password. 8. Enter your e-mail address. You will receive e-mail notification when new information is available in [address] 9. Click Sign Up. You can now view and download portions of your medical record. 10. Click the Download Summary menu link to download a portable copy of your medical information. If you have questions, please visit the Frequently Asked Questions section of the [employer] website. Remember, [employer] is NOT to be used for urgent needs. For medical emergencies, dial 911. Now available from your iPhone and Android! Please provide this summary of care documentation to your next provider. Your primary care clinician is listed as Deanna Hollins. If you have any questions after today's visit, please call [phone].

## 2017-08-29 ENCOUNTER — TELEPHONE (OUTPATIENT)
Dept: NEUROLOGY | Age: 62
End: 2017-08-29

## 2017-08-29 NOTE — TELEPHONE ENCOUNTER
----- Message from 56Caroline Fri Nai sent at 8/29/2017  3:33 PM EDT -----  Regarding: Dr. Shaquille Thao  Contact: [phone]  Kenya Handler is requesting a call back regarding vitamins Magnesium and B6 she was advised to take for migraine Secondary contact number is, [phone].

## 2017-08-30 ENCOUNTER — TELEPHONE (OUTPATIENT)
Dept: NEUROLOGY | Age: 62
End: 2017-08-30

## 2017-09-01 NOTE — TELEPHONE ENCOUNTER
Start taking magnesium oxide 400 mg tablets 1 tablet twice daily  Start taking vitamin B2 400mg daily      Electronically signed by Juan Block MD at 07/11/17 7983   Spoke to patient and informed her of providers recommendations. Patient stated understanding.

## 2017-09-13 DIAGNOSIS — G43.719 INTRACTABLE CHRONIC MIGRAINE WITHOUT AURA AND WITHOUT STATUS MIGRAINOSUS: ICD-10-CM

## 2017-09-14 RX ORDER — DIVALPROEX SODIUM 250 MG/1
TABLET, DELAYED RELEASE ORAL
Qty: 150 TAB | Refills: 0 | Status: SHIPPED | OUTPATIENT
Start: 2017-09-14 | End: 2017-12-03 | Stop reason: SDUPTHER

## 2017-10-24 DIAGNOSIS — G43.809 CERVICOGENIC MIGRAINE: ICD-10-CM

## 2017-10-24 DIAGNOSIS — M62.838 MUSCLE SPASM: ICD-10-CM

## 2017-10-24 RX ORDER — TIZANIDINE 4 MG/1
TABLET ORAL
Qty: 30 TAB | Refills: 0 | Status: SHIPPED | OUTPATIENT
Start: 2017-10-24 | End: 2017-12-03 | Stop reason: SDUPTHER

## 2017-12-03 DIAGNOSIS — G43.809 CERVICOGENIC MIGRAINE: ICD-10-CM

## 2017-12-03 DIAGNOSIS — M62.838 MUSCLE SPASM: ICD-10-CM

## 2017-12-03 DIAGNOSIS — G43.719 INTRACTABLE CHRONIC MIGRAINE WITHOUT AURA AND WITHOUT STATUS MIGRAINOSUS: ICD-10-CM

## 2017-12-03 RX ORDER — TIZANIDINE 4 MG/1
TABLET ORAL
Qty: 30 TAB | Refills: 0 | Status: SHIPPED | OUTPATIENT
Start: 2017-12-03 | End: 2018-02-01 | Stop reason: SDUPTHER

## 2017-12-03 RX ORDER — DIVALPROEX SODIUM 250 MG/1
TABLET, DELAYED RELEASE ORAL
Qty: 150 TAB | Refills: 0 | Status: SHIPPED | OUTPATIENT
Start: 2017-12-03 | End: 2018-02-02 | Stop reason: SDUPTHER

## 2018-02-01 DIAGNOSIS — M62.838 MUSCLE SPASM: ICD-10-CM

## 2018-02-01 DIAGNOSIS — G43.809 CERVICOGENIC MIGRAINE: ICD-10-CM

## 2018-02-01 RX ORDER — TIZANIDINE 4 MG/1
TABLET ORAL
Qty: 30 TAB | Refills: 0 | Status: SHIPPED | OUTPATIENT
Start: 2018-02-01 | End: 2018-03-25 | Stop reason: SDUPTHER

## 2018-02-02 ENCOUNTER — TELEPHONE (OUTPATIENT)
Dept: NEUROLOGY | Age: 63
End: 2018-02-02

## 2018-02-02 DIAGNOSIS — G43.719 INTRACTABLE CHRONIC MIGRAINE WITHOUT AURA AND WITHOUT STATUS MIGRAINOSUS: ICD-10-CM

## 2018-02-02 RX ORDER — DIVALPROEX SODIUM 250 MG/1
TABLET, DELAYED RELEASE ORAL
Qty: 150 TAB | Refills: 0 | Status: SHIPPED | OUTPATIENT
Start: 2018-02-02 | End: 2018-03-20 | Stop reason: SDUPTHER

## 2018-02-02 NOTE — TELEPHONE ENCOUNTER
----- Message from Santos Toscano sent at 2/2/2018 12:24 PM EST -----  Regarding: Dr. Xiao/Telephone/Rx  Pt is requesting a refill on her Rx Depakote, which she has been out of for 4 days now. The pt stated [facility] which [facility] on file. The pt would like a call regarding this issue. The pt best contact number is [phone] and if she can not be reached on that number she stated you can reach her on her daughter's Aniya's phone # which is [phone].

## 2018-02-20 ENCOUNTER — TELEPHONE (OUTPATIENT)
Dept: NEUROLOGY | Age: 63
End: 2018-02-20

## 2018-02-20 NOTE — TELEPHONE ENCOUNTER
----- Message from Jose Baer sent at 2/20/2018  2:50 PM EST -----  Regarding: Dr Xiao/Telephone  Pt called to see if she needed to be seen by Dr Rambo Tejeda.     Contact [phone] or [phone]

## 2018-02-21 NOTE — TELEPHONE ENCOUNTER
Message left for patient to schedule appointment with the NP as she has not been seen in office since July 2017. She did not keep her 3 month follow up.

## 2018-03-20 DIAGNOSIS — G43.719 INTRACTABLE CHRONIC MIGRAINE WITHOUT AURA AND WITHOUT STATUS MIGRAINOSUS: ICD-10-CM

## 2018-03-20 RX ORDER — DIVALPROEX SODIUM 250 MG/1
TABLET, DELAYED RELEASE ORAL
Qty: 150 TAB | Refills: 0 | Status: SHIPPED | OUTPATIENT
Start: 2018-03-20 | End: 2018-04-19 | Stop reason: SDUPTHER

## 2018-03-20 NOTE — TELEPHONE ENCOUNTER
----- [facility] from [facility] sent at 3/20/2018 12:13 PM EDT -----  Regarding: Dr. Narda Castellano / Rx refill  Contact: [phone]  Pt requested a Rx refill on Divalproex 250 mgs called into [facility] @ [phone]. Pt stated she is out of medication.

## 2018-03-25 DIAGNOSIS — M62.838 MUSCLE SPASM: ICD-10-CM

## 2018-03-25 DIAGNOSIS — G43.809 CERVICOGENIC MIGRAINE: ICD-10-CM

## 2018-03-26 RX ORDER — TIZANIDINE 4 MG/1
TABLET ORAL
Qty: 30 TAB | Refills: 0 | Status: SHIPPED | OUTPATIENT
Start: 2018-03-26 | End: 2018-04-19 | Stop reason: SDUPTHER

## 2018-04-03 ENCOUNTER — OFFICE VISIT (OUTPATIENT)
Dept: NEUROLOGY | Age: 63
End: 2018-04-03

## 2018-04-03 VITALS
RESPIRATION RATE: 17 BRPM | WEIGHT: 187 LBS | TEMPERATURE: 98 F | OXYGEN SATURATION: 98 % | HEART RATE: 73 BPM | DIASTOLIC BLOOD PRESSURE: 90 MMHG | BODY MASS INDEX: 35.3 KG/M2 | HEIGHT: 61 IN | SYSTOLIC BLOOD PRESSURE: 132 MMHG

## 2018-04-03 DIAGNOSIS — G40.919 BREAKTHROUGH SEIZURE (HCC): ICD-10-CM
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
## 2018-04-03 DIAGNOSIS — Z51.81 MEDICATION MONITORING ENCOUNTER: ICD-10-CM

## 2018-04-03 DIAGNOSIS — G43.019 INTRACTABLE MIGRAINE WITHOUT AURA AND WITHOUT STATUS MIGRAINOSUS: ICD-10-CM

## 2018-04-03 DIAGNOSIS — G40.219 PARTIAL EPILEPSY WITH IMPAIRMENT OF CONSCIOUSNESS, INTRACTABLE (HCC): Primary | ICD-10-CM

## 2018-04-03 NOTE — PATIENT INSTRUCTIONS
Information Regarding Testing     If you have physican order for a test or a medication denied by your insurance company, this does not mean the test or medication is not appropriate for you as that is a medical decision, not a decision to be made by an insurance company representative or by an [facility] physician who has not interviewed and examined you. This is a decision to be made between you and your physician. The denial of services is a contractual matter between you and your insurance company, not an issue between your physician and the insurance company. If your test or medication is denied, you can take the following steps to help resolve the issue:    1. File a complaint with the [facility] regarding your insurance company's denial of services ordered for you. You can do this either by calling them directly or by completing an on-line complaint form on the [employer]. This can be found at [email or web address]    2. Also file a formal complaint with your insurance company and ask to have the name of the person denying the service so that you may explore a legal option should you be harmed by this denial of service. Again, the fact the insurance company will not pay for the service does not mean it is not medically necessary and I would encourage you to follow through with the plan that was made with your physician    3. File a written complaint with your employer so your employer and benefit manager is aware of the poor coverage they are providing their employees. If you have medicare/medicaid, complain to your representative in the House and to your Jose Pollock.     10 [facility]   Statement to Patients  April 1, 2014      In an effort to ensure the large volume of patient prescription refills is processed in the most efficient and expeditious manner, we are asking our patients to assist us by calling your Pharmacy for all prescription refills, this will include also your  Mail Order Pharmacy. The pharmacy will contact our office electronically to continue the refill process. Please do not wait until the last minute to call your pharmacy. We need at least 48 hours (2days) to fill prescriptions. We also encourage you to call your pharmacy before going to  your prescription to make sure it is ready. With regard to controlled substance prescription refill requests (narcotic refills) that need to be picked up at our office, we ask your cooperation by providing us with at least 72 hours (3days) notice that you will need a refill. We will not refill narcotic prescription refill requests after 4:00pm on any weekday, Monday through Thursday, or after 2:00pm on Fridays, or on the weekends. We encourage everyone to explore another way of getting your prescription refill request processed using [employer], our patient web portal through our electronic medical record system. M[employer]ider is an efficient and effective way to communicate your medication request directly to the office and  downloadable as an otf on your smart phone . M[employer]ider also features a review functionality that allows you to view your medication list as well as leave messages for your physician. Are you ready to get connected? If so please review the attatched instructions or speak to any of our staff to get you set up right away! Thank you so much for your cooperation. Should you have any questions please contact our Practice Administrator. The Physicians and Staff,  [facility]       If we have ordered testing for you, we do not call patients with results and we do not give test results over the phone. We schedule follow up appointments so that your results can be discussed in person and any questions you have regarding them may be addressed.   If something of concern is revealed on your test, we will call you for a sooner follow up appointment. Additionally, results may be found by using the My Chart feature and one of our patient service representatives at the  can give you instructions on how to access this feature of our electronic medical record system. Learning About Living Sandhya Strickland  What is a living will? A living will is a legal form you use to write down the kind of care you want at the end of your life. It is used by the health professionals who will treat you if you aren't able to decide for yourself. If you put your wishes in writing, your loved ones and others will know what kind of care you want. They won't need to guess. This can ease your mind and be helpful to others. A living will is not the same as an estate or property will. An estate will explains what you want to happen with your money and property after you die. Is a living will a legal document? A living will is a legal document. Each state has its own laws about living grewal. If you move to another state, make sure that your living will is legal in the state where you now live. Or you might use a universal form that has been approved by many states. This kind of form can sometimes be completed and stored online. Your electronic copy will then be available wherever you have a connection to the Internet. In most cases, doctors will respect your wishes even if you have a form from a different state. · You don't need an  to complete a living will. But legal advice can be helpful if your state's laws are unclear, your health history is complicated, or your family can't agree on what should be in your living will. · You can change your living will at any time. Some people find that their wishes about end-of-life care change as their health changes. · In addition to making a living will, think about completing a medical power of  form.  This form lets you name the person you want to make end-of-life treatment decisions for you (your \"health care agent\") if you're not able to. Many hospitals and nursing homes will give you the forms you need to complete a living will and a medical power of . · Your living will is used only if you can't make or communicate decisions for yourself anymore. If you become able to make decisions again, you can accept or refuse any treatment, no matter what you wrote in your living will. · Your state may offer an online registry. This is a place where you can store your living will online so the doctors and nurses who need to treat you can find it right away. What should you think about when creating a living will? Talk about your end-of-life wishes with your family members and your doctor. Let them know what you want. That way the people making decisions for you won't be surprised by your choices. Think about these questions as you make your living will:  · Do you know enough about life support methods that might be used? If not, talk to your doctor so you know what might be done if you can't breathe on your own, your heart stops, or you're unable to swallow. · What things would you still want to be able to do after you receive life-support methods? Would you want to be able to walk? To speak? To eat on your own? To live without the help of machines? · If you have a choice, where do you want to be cared for? In your home? At a hospital or nursing home? · Do you want certain Yazidi practices performed if you become very ill? · If you have a choice at the end of your life, where would you prefer to die? At home? In a hospital or nursing home? Somewhere else? · Would you prefer to be buried or cremated? · Do you want your organs to be donated after you die? What should you do with your living will? · Make sure that your family members and your health care agent have copies of your living will. · Give your doctor a copy of your living will to keep in your medical record.  If you have more than one doctor, make sure that each one has a copy. · You may want to put a copy of your living will where it can be easily found. Where can you learn more? Go to http://soren-surjit.info/. Enter V670 in the search box to learn more about \"Learning About Living Perroy. \"  Current as of: September 24, 2016  Content Version: 11.4  © 9519-2771 [employer]. Care instructions adapted under license by [employer] (which disclaims liability or warranty for this information). If you have questions about a medical condition or this instruction, always ask your healthcare professional. [place] [address] any warranty or liability for your use of this information. Learning About Living Perroy  What is a living will? A living will is a legal form you use to write down the kind of care you want at the end of your life. It is used by the health professionals who will treat you if you aren't able to decide for yourself. If you put your wishes in writing, your loved ones and others will know what kind of care you want. They won't need to guess. This can ease your mind and be helpful to others. A living will is not the same as an estate or property will. An estate will explains what you want to happen with your money and property after you die. Is a living will a legal document? A living will is a legal document. Each state has its own laws about living grewal. If you move to another state, make sure that your living will is legal in the state where you now live. Or you might use a universal form that has been approved by many states. This kind of form can sometimes be completed and stored online. Your electronic copy will then be available wherever you have a connection to the Internet. In most cases, doctors will respect your wishes even if you have a form from a different state. · You don't need an  to complete a living will.  But legal advice can be helpful if your state's laws are unclear, your health history is complicated, or your family can't agree on what should be in your living will. · You can change your living will at any time. Some people find that their wishes about end-of-life care change as their health changes. · In addition to making a living will, think about completing a medical power of  form. This form lets you name the person you want to make end-of-life treatment decisions for you (your \"health care agent\") if you're not able to. Many hospitals and nursing homes will give you the forms you need to complete a living will and a medical power of . · Your living will is used only if you can't make or communicate decisions for yourself anymore. If you become able to make decisions again, you can accept or refuse any treatment, no matter what you wrote in your living will. · Your state may offer an online registry. This is a place where you can store your living will online so the doctors and nurses who need to treat you can find it right away. What should you think about when creating a living will? Talk about your end-of-life wishes with your family members and your doctor. Let them know what you want. That way the people making decisions for you won't be surprised by your choices. Think about these questions as you make your living will:  · Do you know enough about life support methods that might be used? If not, talk to your doctor so you know what might be done if you can't breathe on your own, your heart stops, or you're unable to swallow. · What things would you still want to be able to do after you receive life-support methods? Would you want to be able to walk? To speak? To eat on your own? To live without the help of machines? · If you have a choice, where do you want to be cared for? In your home? At a hospital or nursing home? · Do you want certain Jew practices performed if you become very ill?   · If you have a choice at the end of your life, where would you prefer to die? At home? In a hospital or nursing home? Somewhere else? · Would you prefer to be buried or cremated? · Do you want your organs to be donated after you die? What should you do with your living will? · Make sure that your family members and your health care agent have copies of your living will. · Give your doctor a copy of your living will to keep in your medical record. If you have more than one doctor, make sure that each one has a copy. · You may want to put a copy of your living will where it can be easily found. Where can you learn more? Go to http://soren-surjit.info/. Enter H992 in the search box to learn more about \"Learning About Living Karina Sherwood. \"  Current as of: September 24, 2016  Content Version: 11.4  © 7362-7150 [employer]. Care instructions adapted under license by [employer] (which disclaims liability or warranty for this information). If you have questions about a medical condition or this instruction, always ask your healthcare professional. [address] any warranty or liability for your use of this information.

## 2018-04-03 NOTE — PROGRESS NOTES
[facility] NEUROLOGY [place] [address]   [place] [address]   [address]   [phone] [facility]   [phone] Fax               Chief Complaint   Patient presents with    Seizure     follow up     Current Outpatient Prescriptions   Medication Sig Dispense Refill    tiZANidine (ZANAFLEX) 4 mg tablet TAKE 1 TABLET BY MOUTH THREE TIMES DAILY AS NEEDED FOR MUSCLE SPASM 30 Tab 0    divalproex DR (DEPAKOTE) 250 mg tablet TAKE 3 TABLETS BY MOUTH EVERY MORNING AND 3 TABLETS EVERY EVENING 150 Tab 0    ROSUVASTATIN CALCIUM (CRESTOR PO) Take  by mouth.  PARoxetine (PAXIL) 20 mg tablet Take  by mouth daily.  ALPRAZOLAM (XANAX PO) Take  by mouth.  gabapentin (NEURONTIN) 300 mg capsule Take 300 mg by mouth nightly.  diphenhydrAMINE (BENADRYL ALLERGY) 25 mg tablet Take 25 mg by mouth every six (6) hours as needed.  sitaGLIPtin-metFORMIN (JANUMET) 50-1,000 mg per tablet Take 1 Tab by mouth two (2) times daily (with meals).  pioglitazone (ACTOS) 15 mg tablet Take  by mouth. 10 MG BID       glipiZIDE (GLUCOTROL) 5 mg tablet Take 10 mg by mouth two (2) times a day.  cholecalciferol, vitamin d3, (VITAMIN D) 1,000 unit tablet Take  by mouth daily.  fluticasone (FLONASE) 50 mcg/actuation nasal spray 2 Sprays by Both Nostrils route daily.  HYDROcodone-acetaminophen (NORCO) 7.5-325 mg per tablet Take  by mouth. Allergies   Allergen Reactions    Flexall [Menthol-Aloe Vera Extract] Other (comments)    Sudafed [Pseudoephedrine Hcl] Unknown (comments)     Social History   Substance Use Topics    Smoking status: Never Smoker    Smokeless tobacco: Never Used    Alcohol use No     Patient returns today for follow-up. See her for partial epilepsy and migraines. In the interim since I saw her last she notes that she had one small seizure and she indicates that this occurred secondary to missing her Depakote and she ran out for 3 days.   Her headaches and also increased and she stopped taking the supplements she was using to augment the Depakote because her urine was orange and this frightened her. We discussed this is a normal side effect of vitamin supplementation. She also took a fall in the snow. She also took a fall in her driveway. She went to the [facility] emergency department where she had CT scan of the head, neck, and what she describes as the abdomen in the area and she was discharged from emergency department. She is also gaining weight. She says that she has changed her diet eliminating sweets and sodas. She has changed her diet eliminating soda and also eliminating sweet foods. She asks about topamax because she read on the Internet help with weight loss. She has not had any fever or chills. No chest pain palpitation. No rash. ROS  Pertinent positives and negatives are as noted above otherwise extensive systems review is negative    Examination  Visit Vitals    /90    Pulse 73    Temp 98 °F (36.7 °C)    Resp 17    Ht 5' 1\" (1.549 m)    Wt 84.8 kg (187 lb)    SpO2 98%    BMI 35.33 kg/m2     She is awake alert pleasant. Well-appearing. No icterus. No edema. She is overweight. She has appropriate dress and grooming. No nystagmus. No pronation or drift. No ataxia. She generates full strength in the upper and lower extremities in all muscle groups to direct confrontational testing. No reflex asymmetry. No edema. Symmetric pulses. Impression/Plan  Partial epilepsy with breakthrough seizure secondary to running out of medication. Counseled keeping up with her medication refills. Continue with Depakote current dose. We will get CBC and comprehensive metabolic profile for monitoring secondary to side effects/metabolic abnormalities with chronic Depakote use.     Elevated BMI/obesity and her concern with increased weight and we did discuss weight gain is also on Depakote but there are also lifestyle factors in play.  She does note that she has changed her diet somewhat. She wishes to change on the Topamax we discussed that we did try Topamax back in 2016 and she had some untoward side effects and therefore we will not go back to Topamax and certainly not just use Topamax for weight loss for her. We discussed medical supervised weight loss and we will refer her to Dr. Talha Cruz for consideration of medically supervised weight loss program.    Status post fall with continued discomfort and she has had a visit to the emergency department and has had multiple scans which are said to be normal.  We will refer her to physical therapy to evaluate and treat and she will go to a center close to her down in [facility] here    Migraine headaches which have increased and she is not using vitamin supplementation that has helped her in the past.  She will go back to supplementation and the Depakote certainly is helping that as well so she will continue with Depakote for epilepsy as well as migraine prevention    Follow-up in 4 months        This note was created using voice recognition software. Despite editing, there may be syntax errors. This note will not be viewable in [address]

## 2018-04-03 NOTE — PROGRESS NOTES
Follow up for seizure activity. Reports one seizure due to missing 3 days of medication. Reports 2 falls in the snow and hit back of head. Went to urgent care. Had CT scan. WNL.

## 2018-04-19 DIAGNOSIS — G43.719 INTRACTABLE CHRONIC MIGRAINE WITHOUT AURA AND WITHOUT STATUS MIGRAINOSUS: ICD-10-CM

## 2018-04-19 DIAGNOSIS — M62.838 MUSCLE SPASM: ICD-10-CM

## 2018-04-19 DIAGNOSIS — G43.809 CERVICOGENIC MIGRAINE: ICD-10-CM

## 2018-04-20 RX ORDER — DIVALPROEX SODIUM 250 MG/1
TABLET, DELAYED RELEASE ORAL
Qty: 150 TAB | Refills: 0 | Status: SHIPPED | OUTPATIENT
Start: 2018-04-20 | End: 2018-05-28 | Stop reason: SDUPTHER

## 2018-04-20 RX ORDER — TIZANIDINE 4 MG/1
TABLET ORAL
Qty: 30 TAB | Refills: 0 | Status: SHIPPED | OUTPATIENT
Start: 2018-04-20 | End: 2018-05-28 | Stop reason: SDUPTHER

## 2018-05-28 DIAGNOSIS — M62.838 MUSCLE SPASM: ICD-10-CM

## 2018-05-28 DIAGNOSIS — G43.719 INTRACTABLE CHRONIC MIGRAINE WITHOUT AURA AND WITHOUT STATUS MIGRAINOSUS: ICD-10-CM

## 2018-05-28 DIAGNOSIS — G43.809 CERVICOGENIC MIGRAINE: ICD-10-CM

## 2018-05-28 RX ORDER — DIVALPROEX SODIUM 250 MG/1
TABLET, DELAYED RELEASE ORAL
Qty: 150 TAB | Refills: 0 | Status: SHIPPED | OUTPATIENT
Start: 2018-05-28 | End: 2018-06-29 | Stop reason: SDUPTHER

## 2018-05-28 RX ORDER — TIZANIDINE 4 MG/1
TABLET ORAL
Qty: 30 TAB | Refills: 0 | Status: SHIPPED | OUTPATIENT
Start: 2018-05-28 | End: 2018-08-06 | Stop reason: ALTCHOICE

## 2018-05-29 ENCOUNTER — TELEPHONE (OUTPATIENT)
Dept: NEUROLOGY | Age: 63
End: 2018-05-29

## 2018-05-29 NOTE — TELEPHONE ENCOUNTER
Refill sent yesterday by provider. Message left for patient that refills were sent to [place] listed.

## 2018-05-29 NOTE — TELEPHONE ENCOUNTER
----- Message from Lorenza Jaime sent at 5/29/2018 10:39 AM EDT -----  Regarding: Dr. Lundy Smoker  Pt requesting a refill of medications \"Divalproex delayed release\" 250 MG and \"Tizanidine\" 4 MG to be sent to [facility] pharmacy at [facility] (V)[phone]. Pt stated, she will be going out of town and would require a refill of both medications by Thursday 05/31/18. Best contact number ('s cell) [phone] alternated number  [phone].

## 2018-06-29 DIAGNOSIS — G43.719 INTRACTABLE CHRONIC MIGRAINE WITHOUT AURA AND WITHOUT STATUS MIGRAINOSUS: ICD-10-CM

## 2018-06-29 RX ORDER — DIVALPROEX SODIUM 250 MG/1
TABLET, DELAYED RELEASE ORAL
Qty: 150 TAB | Refills: 0 | Status: SHIPPED | OUTPATIENT
Start: 2018-06-29 | End: 2018-08-06 | Stop reason: SDUPTHER

## 2018-08-06 ENCOUNTER — OFFICE VISIT (OUTPATIENT)
Dept: NEUROLOGY | Age: 63
End: 2018-08-06

## 2018-08-06 VITALS — HEART RATE: 110 BPM | OXYGEN SATURATION: 96 % | SYSTOLIC BLOOD PRESSURE: 128 MMHG | DIASTOLIC BLOOD PRESSURE: 70 MMHG

## 2018-08-06 DIAGNOSIS — G43.809 CERVICOGENIC MIGRAINE: ICD-10-CM

## 2018-08-06 DIAGNOSIS — M62.838 MUSCLE SPASM: Primary | ICD-10-CM

## 2018-08-06 DIAGNOSIS — G43.719 INTRACTABLE CHRONIC MIGRAINE WITHOUT AURA AND WITHOUT STATUS MIGRAINOSUS: ICD-10-CM

## 2018-08-06 RX ORDER — CYCLOBENZAPRINE HCL 10 MG
10 TABLET ORAL
Qty: 30 TAB | Refills: 3 | Status: SHIPPED | OUTPATIENT
Start: 2018-08-06 | End: 2018-12-17 | Stop reason: SDUPTHER

## 2018-08-06 RX ORDER — DIVALPROEX SODIUM 250 MG/1
TABLET, DELAYED RELEASE ORAL
Qty: 180 TAB | Refills: 3 | Status: SHIPPED | OUTPATIENT
Start: 2018-08-06 | End: 2019-04-26 | Stop reason: SDUPTHER

## 2018-08-06 NOTE — PATIENT INSTRUCTIONS
10 [facility]   Statement to Patients  April 1, 2014      In an effort to ensure the large volume of patient prescription refills is processed in the most efficient and expeditious manner, we are asking our patients to assist us by calling your Pharmacy for all prescription refills, this will include also your  Mail Order Pharmacy. The pharmacy will contact our office electronically to continue the refill process. Please do not wait until the last minute to call your pharmacy. We need at least 48 hours (2days) to fill prescriptions. We also encourage you to call your pharmacy before going to  your prescription to make sure it is ready. With regard to controlled substance prescription refill requests (narcotic refills) that need to be picked up at our office, we ask your cooperation by providing us with at least 72 hours (3days) notice that you will need a refill. We will not refill narcotic prescription refill requests after 4:00pm on any weekday, Monday through Thursday, or after 2:00pm on Fridays, or on the weekends. We encourage everyone to explore another way of getting your prescription refill request processed using Cle[employer]Miles, our patient web portal through our electronic medical record system. CleverMiles is an efficient and effective way to communicate your medication request directly to the office and  downloadable as an otf on your smart phone . Cle[employer]iles also features a review functionality that allows you to view your medication list as well as leave messages for your physician. Are you ready to get connected? If so please review the attatched instructions or speak to any of our staff to get you set up right away! Thank you so much for your cooperation. Should you have any questions please contact our Practice Administrator.     The Physicians and Staff,  [facility]

## 2018-08-06 NOTE — MR AVS SNAPSHOT
[address] [id number] [address] [address] 
[phone] Patient: Justin King MRN: [id number] [id number] Visit Information Date & Time Provider Department Dept. Phone Encounter #  
 8/6/2018  3:30 PM Viv Dickens  [facility] [phone] [id number] Follow-up Instructions Return in about 6 months (around 2/6/2019). Upcoming Health Maintenance Date Due Hepatitis C Screening 1955 FOOT EXAM Q1 1/15/1964 MICROALBUMIN Q1 1/15/1964 EYE EXAM RETINAL OR DILATED Q1 1/15/1964 Pneumococcal 19-64 Medium Risk (1 of 1 - PPSV23) 1/15/1973 DTaP/Tdap/Td series (1 - Tdap) 1/15/1975 BREAST CANCER SCRN MAMMOGRAM 1/15/2004 ZOSTER VACCINE AGE 60> 11/15/2013 HEMOGLOBIN A1C Q6M 11/28/2014 LIPID PANEL Q1 1/16/2015 FOBT Q 1 YEAR AGE 50-75 1/21/2015 PAP AKA CERVICAL CYTOLOGY 1/6/2017 MEDICARE YEARLY EXAM 3/14/2018 Influenza Age 5 to Adult 8/1/2018 Allergies as of 8/6/2018  Review Complete On: 8/6/2018 By: Eliodoro Gaucher, LPN Severity Noted Reaction Type Reactions Flexall [Menthol-aloe Vera Extract]  05/28/2014    Other (comments) Sudafed [Pseudoephedrine Hcl]  04/13/2011    Unknown (comments) Current Immunizations  Never Reviewed No immunizations on file. Not reviewed this visit You Were Diagnosed With   
  
 Codes Comments Muscle spasm    -  Primary ICD-10-CM: D26.353 ICD-9-CM: 728.85 Intractable chronic migraine without aura and without status migrainosus     ICD-10-CM: S18.271 ICD-9-CM: 346.71 Cervicogenic migraine     ICD-10-CM: M28.964 ICD-9-CM: 346.80 Vitals BP Pulse SpO2 OB Status Smoking Status 128/70 (!) 110 96% Postmenopausal Never Smoker Preferred Pharmacy Pharmacy Name Phone  [address] [facility] AT [facility] OF [address] [phone] Your Updated Medication List  
  
   
This list is accurate as of 8/6/18  3:54 PM.  Always use your most recent med list.  
  
  
  
  
 ACTOS 15 mg tablet Generic drug:  pioglitazone Take  by mouth. 10 MG BID BENADRYL ALLERGY 25 mg tablet Generic drug:  diphenhydrAMINE Take 25 mg by mouth every six (6) hours as needed. CRESTOR PO Take  by mouth. cyclobenzaprine 10 mg tablet Commonly known as:  FLEXERIL Take 1 Tab by mouth nightly. divalproex  mg tablet Commonly known as:  DEPAKOTE  
TAKE 3 TABLETS BY MOUTH EVERY MORNING AND 3 TABLETS EVERY EVENING  
  
 FLONASE 50 mcg/actuation nasal spray Generic drug:  fluticasone 2 Sprays by Both Nostrils route daily. gabapentin 300 mg capsule Commonly known as:  NEURONTIN Take 300 mg by mouth nightly. glipiZIDE 5 mg tablet Commonly known as:  Atlee Crystal Take 10 mg by mouth two (2) times a day. HYDROcodone-acetaminophen 7.5-325 mg per tablet Commonly known as:  Cui Félix Take  by mouth. JANUMET 50-1,000 mg per tablet Generic drug:  SITagliptin-metFORMIN Take 1 Tab by mouth two (2) times daily (with meals). PAXIL 20 mg tablet Generic drug:  PARoxetine Take  by mouth daily. VITAMIN D3 1,000 unit tablet Generic drug:  cholecalciferol Take  by mouth daily. XANAX PO Take  by mouth. Prescriptions Sent to Pharmacy Refills  
 cyclobenzaprine (FLEXERIL) 10 mg tablet 3 Sig: Take 1 Tab by mouth nightly. Class: Normal  
 Pharmacy: [facility] [address] [place], [place] [address]y [address] [place] [address] #: [phone] Route: Oral  
 divalproex DR (DEPAKOTE) 250 mg tablet 3 Sig: TAKE 3 TABLETS BY MOUTH EVERY MORNING AND 3 TABLETS EVERY EVENING  Class: Normal  
 Pharmacy: [address] [place], [place] [address]y [address] AT [facility]  #: [phone] Follow-up Instructions Return in about 6 months (around 2/6/2019). Patient Instructions PRESCRIPTION REFILL POLICY [facility] Statement to Patients April 1, 2014 In an effort to ensure the large volume of patient prescription refills is processed in the most efficient and expeditious manner, we are asking our patients to assist us by calling your Pharmacy for all prescription refills, this will include also your  Mail Order Pharmacy. The pharmacy will contact our office electronically to continue the refill process. Please do not wait until the last minute to call your pharmacy. We need at least 48 hours (2days) to fill prescriptions. We also encourage you to call your pharmacy before going to  your prescription to make sure it is ready. With regard to controlled substance prescription refill requests (narcotic refills) that need to be picked up at our office, we ask your cooperation by providing us with at least 72 hours (3days) notice that you will need a refill. We will not refill narcotic prescription refill requests after 4:00pm on any weekday, Monday through Thursday, or after 2:00pm on Fridays, or on the weekends. We encourage everyone to explore another way of getting your prescription refill request processed using [employer], our patient web portal through our electronic medical record system. [employer] is an efficient and effective way to communicate your medication request directly to the office and  downloadable as an otf on your smart phone . [employer] also features a review functionality that allows you to view your medication list as well as leave messages for your physician. Are you ready to get connected? If so please review the attatched instructions or speak to any of our staff to get you set up right away! Thank you so much for your cooperation.  Should you have any questions please contact our Practice Administrator. The Physicians and Staff,  [facility] Introducing [facility]! [employer] introduces [employer] patient portal. Now you can access parts of your medical record, email your doctor's office, and request medication refills online. 1. In your internet browser, go to [email or web address] 2. Click on the First Time User? Click Here link in the Sign In box. You will see the New Member Sign Up page. 3. Enter your [employer] Access Code exactly as it appears below. You will not need to use this code after youve completed the sign-up process. If you do not sign up before the expiration date, you must request a new code. · [employer] Access Code: [email or web address] Expires: 11/4/2018  3:54 PM 
 
4. Enter the last four digits of your Social Security Number (xxxx) and Date of Birth (mm/dd/yyyy) as indicated and click Submit. You will be taken to the next sign-up page. 5. Create a [employer] ID. This will be your [employer] login ID and cannot be changed, so think of one that is secure and easy to remember. 6. Create a [employer] password. You can change your password at any time. 7. Enter your Password Reset Question and Answer. This can be used at a later time if you forget your password. 8. Enter your e-mail address. You will receive e-mail notification when new information is available in [address] 9. Click Sign Up. You can now view and download portions of your medical record. 10. Click the Download Summary menu link to download a portable copy of your medical information. If you have questions, please visit the Frequently Asked Questions section of the [employer] website. Remember, [employer] is NOT to be used for urgent needs. For medical emergencies, dial 911. Now available from your iPhone and Android! Please provide this summary of care documentation to your next provider. Your primary care clinician is listed as Abdullahi Baxter. If you have any questions after today's visit, please call [phone].

## 2018-08-06 NOTE — PROGRESS NOTES
Date:  18     Name:  Valeria Salguero  :  1955  MRN:  [id number]     PCP:  Handy Campbell MD    Chief Complaint   Patient presents with    Seizure       HISTORY OF PRESENT ILLNESS:Follow up visit for Seizure. Patient reports she had not had any seizure since the last visit. She is Currently taking Depakote 250 mg tabs 3 in the morning and 3 in the evening. She endorses compliance. Lab work check by dr. Forest Leal last visit. She reports having them completed, but we do not have a copy in our records. As for her headache, she recently was prescribed Flexeril for the muscle spasms in her neck. This was effective was wondering if she could switch muscle relaxants. Except as noted above, denies  fever, chills, cough. No CP or SOB. No dysuria, loss of bowel or bladder control. No Weight loss. Appetite good. Sleeping well. No sweats. No edema. No bruising or bleeding. No nausea or vomit. No diarrhea. No frequency, urgency, No depressive sxs. No anxiety. Denies sore throat, nasal congestion, nasal discharge, epistaxis, tinnitus, hearing loss, back pain, muscle pain, or joint pain. Current Outpatient Prescriptions   Medication Sig    cyclobenzaprine (FLEXERIL) 10 mg tablet Take 1 Tab by mouth nightly.  divalproex DR (DEPAKOTE) 250 mg tablet TAKE 3 TABLETS BY MOUTH EVERY MORNING AND 3 TABLETS EVERY EVENING    ROSUVASTATIN CALCIUM (CRESTOR PO) Take  by mouth.  PARoxetine (PAXIL) 20 mg tablet Take  by mouth daily.  ALPRAZOLAM (XANAX PO) Take  by mouth.  gabapentin (NEURONTIN) 300 mg capsule Take 300 mg by mouth nightly.  diphenhydrAMINE (BENADRYL ALLERGY) 25 mg tablet Take 25 mg by mouth every six (6) hours as needed.  sitaGLIPtin-metFORMIN (JANUMET) 50-1,000 mg per tablet Take 1 Tab by mouth two (2) times daily (with meals).  pioglitazone (ACTOS) 15 mg tablet Take  by mouth.  10 MG BID     glipiZIDE (GLUCOTROL) 5 mg tablet Take 10 mg by mouth two (2) times a day.    cholecalciferol, vitamin d3, (VITAMIN D) 1,000 unit tablet Take  by mouth daily.  fluticasone (FLONASE) 50 mcg/actuation nasal spray 2 Sprays by Both Nostrils route daily.  HYDROcodone-acetaminophen (NORCO) 7.5-325 mg per tablet Take  by mouth. No current facility-administered medications for this visit. Allergies   Allergen Reactions    Flexall [Menthol-Aloe Vera Extract] Other (comments)    Sudafed [Pseudoephedrine Hcl] Unknown (comments)     Past Medical History:   Diagnosis Date    Diabetic peripheral neuropathy associated with type 2 diabetes mellitus ([facility] [address]     Dyslipidemia     Headache(784.0)     Hyperlipidemia     Type II or unspecified type diabetes mellitus without mention of complication, uncontrolled     Unspecified epilepsy without mention of intractable epilepsy     Vision decreased      Past Surgical History:   Procedure Laterality Date    HX GYN      tubaligation    HX HEENT      cataract    HX ORTHOPAEDIC      HX OTHER SURGICAL  April 2016    stretched her esopaghus     HX REFRACTIVE SURGERY  02/05/2012    right eye     Social History     Social History    Marital status:      Spouse name: N/A    Number of children: N/A    Years of education: N/A     Occupational History    Not on file. Social History Main Topics    Smoking status: Never Smoker    Smokeless tobacco: Never Used    Alcohol use No    Drug use: Not on file    Sexual activity: No     Other Topics Concern    Not on file     Social History Narrative     Family History   Problem Relation Age of Onset    Cancer Mother     Cancer Other     Heart Disease Father        PHYSICAL EXAMINATION:    Visit Vitals    /70    Pulse (!) 110    SpO2 96%     General: Well defined, nourished, and groomed individual in no acute distress. Neck: Supple, nontender, no bruits, no pain with resistance to active range of motion. Heart: Regular rate and rhythm, no murmurs, rub, or gallop. Normal S1S2. Lungs: Clear to auscultation bilaterally with equal chest expansion, no cough, no wheeze  Musculoskeletal: Extremities revealed no edema and had full range of motion of joints. Psych: Good mood and bright affect      NEUROLOGICAL EXAMINATION:   Mental Status: Alert and oriented to person, place, and time       Cranial Nerves:   II, III, IV, VI: Visual acuity grossly intact. Visual fields are normal.   Pupils are equal, round, and reactive to light and accommodation. Extra-ocular movements are full and fluid. Fundoscopic exam was benign, no ptosis or nystagmus. V-XII: Hearing is grossly intact. Facial features are symmetric, with normal sensation and strength. The palate rises symmetrically and the tongue protrudes midline. Sternocleidomastoids 5/5.       Motor Examination: Normal tone, bulk, and strength, 5/5 muscle strength throughout.       Coordination: No resting or intention tremor      Gait and Station: Steady while walking. Normal arm swing. No pronator drift. No muscle wasting or fasiculations noted.       Reflexes: DTRs 2+ throughout. ASSESSMENT AND PLAN    ICD-10-CM ICD-9-CM    1. Muscle spasm M62.838 728.85 cyclobenzaprine (FLEXERIL) 10 mg tablet      divalproex DR (DEPAKOTE) 250 mg tablet   2. Intractable chronic migraine without aura and without status migrainosus G43.719 346.71 cyclobenzaprine (FLEXERIL) 10 mg tablet      divalproex DR (DEPAKOTE) 250 mg tablet   3. Cervicogenic migraine G43.809 346.80 cyclobenzaprine (FLEXERIL) 10 mg tablet      divalproex DR (DEPAKOTE) 250 mg tablet   Seizure, doing well continue prescription as prescribed. She was on Zanaflex for a muscle relaxant but was recently given flexeril and felt that was more effective. I will  DC Zanaflex and switch it to 10 mg flexeril. Purpose and  potential side effects were discussed and they have verbalized understanding.  Follow up 6 months      Maia Olivarez NP

## 2018-08-13 PROBLEM — E66.01 SEVERE OBESITY (BMI 35.0-39.9): Status: ACTIVE | Noted: 2018-08-13

## 2018-10-16 DIAGNOSIS — G43.809 CERVICOGENIC MIGRAINE: ICD-10-CM

## 2018-10-16 DIAGNOSIS — M62.838 MUSCLE SPASM: ICD-10-CM

## 2018-10-17 RX ORDER — TIZANIDINE 4 MG/1
TABLET ORAL
Qty: 30 TAB | Refills: 0 | Status: SHIPPED | OUTPATIENT
Start: 2018-10-17 | End: 2018-10-24

## 2018-10-24 ENCOUNTER — OFFICE VISIT (OUTPATIENT)
Dept: FAMILY MEDICINE CLINIC | Age: 63
End: 2018-10-24

## 2018-10-24 ENCOUNTER — HOSPITAL ENCOUNTER (OUTPATIENT)
Dept: LAB | Age: 63
Discharge: HOME | End: 2018-10-24
Payer: MEDICARE

## 2018-10-24 VITALS
OXYGEN SATURATION: 99 % | WEIGHT: 192 LBS | HEART RATE: 91 BPM | DIASTOLIC BLOOD PRESSURE: 85 MMHG | SYSTOLIC BLOOD PRESSURE: 147 MMHG | RESPIRATION RATE: 16 BRPM | TEMPERATURE: 98.4 F | BODY MASS INDEX: 36.25 KG/M2 | HEIGHT: 61 IN

## 2018-10-24 DIAGNOSIS — Z51.81 MEDICATION MONITORING ENCOUNTER: ICD-10-CM

## 2018-10-24 DIAGNOSIS — G40.909 NONINTRACTABLE EPILEPSY WITHOUT STATUS EPILEPTICUS, UNSPECIFIED EPILEPSY TYPE (HCC): ICD-10-CM

## 2018-10-24 DIAGNOSIS — E11.65 CONTROLLED TYPE 2 DIABETES MELLITUS WITH HYPERGLYCEMIA, WITHOUT LONG-TERM CURRENT USE OF INSULIN (HCC): Primary | ICD-10-CM

## 2018-10-24 DIAGNOSIS — E78.00 PURE HYPERCHOLESTEROLEMIA: ICD-10-CM

## 2018-10-24 PROBLEM — H54.8 LEGAL BLINDNESS, AS DEFINED IN UNITED STATES OF AMERICA: Status: ACTIVE | Noted: 2018-10-24

## 2018-10-24 PROCEDURE — 83036 HEMOGLOBIN GLYCOSYLATED A1C: CPT

## 2018-10-24 PROCEDURE — 80061 LIPID PANEL: CPT

## 2018-10-24 PROCEDURE — 85025 COMPLETE CBC W/AUTO DIFF WBC: CPT

## 2018-10-24 PROCEDURE — 80164 ASSAY DIPROPYLACETIC ACD TOT: CPT

## 2018-10-24 PROCEDURE — 82043 UR ALBUMIN QUANTITATIVE: CPT

## 2018-10-24 PROCEDURE — 80053 COMPREHEN METABOLIC PANEL: CPT

## 2018-10-24 RX ORDER — CELECOXIB 200 MG/1
CAPSULE ORAL 2 TIMES DAILY
COMMUNITY
End: 2021-03-24 | Stop reason: SDUPTHER

## 2018-10-24 RX ORDER — OMEPRAZOLE 20 MG/1
20 CAPSULE, DELAYED RELEASE ORAL DAILY
COMMUNITY
End: 2019-02-06 | Stop reason: SDUPTHER

## 2018-10-24 NOTE — PROGRESS NOTES
Chief Complaint   Patient presents with   [address]     she is a 58y.o. year old female who presents for evalution. Pt states typically goes to [facility], was told will no longer be able to see any providers since about to be over the age of 71. Checking blood sugar once daily. Typically running in 180s-190s when checking when wakes up in AM.  No episodes of hypoglycemia. Last eye exam was yesterday and was normal.  Tries to watch diet at home but spotty - sometimes skips meals and snacks at night time. No problems or concerns today. Would like Depakote levels checked. BP slightly high but never an issue for pt before. Pt seen today with sister-in-law. Reviewed PmHx, RxHx, FmHx, SocHx, AllgHx and updated and dated in the chart. Review of Systems - negative except as listed above in the HPI    Objective:     Vitals:    10/24/18 0951   BP: 147/85   Pulse: 91   Resp: 16   Temp: 98.4 °F (36.9 °C)   TempSrc: Oral   SpO2: 99%   Weight: 192 lb (87.1 kg)   Height: 5' 1\" (1.549 m)     Physical Examination: General appearance - alert, well appearing, and in no distress  Chest - clear to auscultation, no wheezes, rales or rhonchi, symmetric air entry  Heart - normal rate, regular rhythm, normal S1, S2, no murmurs, rubs, clicks or gallops  Extremities - peripheral pulses normal, no pedal edema, no clubbing or cyanosis    Assessment/ Plan:   Diagnoses and all orders for this visit:    1. Controlled type 2 diabetes mellitus with hyperglycemia, without long-term current use of insulin (HCC)  -     HEMOGLOBIN A1C WITH EAG  -     METABOLIC PANEL, COMPREHENSIVE  -     LIPID PANEL  -     MICROALBUMIN, UR, RAND W/ MICROALB/CREAT RATIO  Discussed importance of diabetic diet, need for regular exercise. Reviewed disease process and complications that arise from poor control of blood sugars - kidney damage, blindness, amputation, paresthesias. Goal of HgbA1c below 7 and LDL below 100.   Need for yearly eye exam and regular foot care. Cont current medications, F/U 3 mo. 2. Nonintractable epilepsy without status epilepticus, unspecified epilepsy type ([facility] [address]  -     VALPROIC ACID    3. Pure hypercholesterolemia  -     METABOLIC PANEL, COMPREHENSIVE  -     LIPID PANEL  Discussed need for low fat diet, rich in fruits and vegetables. Encouraged regular meals and daily exercise of at least 20 minutes. Reviewed sequela of high cholesterol on heart and brain health. Continue current medications. F/U 3 mo. 4. Medication monitoring encounter  -     CBC WITH AUTOMATED DIFF  -     VALPROIC ACID  Will decide on FU after reviewing labs. Pt voiced understanding regarding plan of care. Follow-up Disposition:  Return if symptoms worsen or fail to improve. I have discussed the diagnosis with the patient and the intended plan as seen in the above orders. The patient has received an after-visit summary and questions were answered concerning future plans.      Medication Side Effects and Warnings were discussed with patient    Antonio Smith NP

## 2018-10-24 NOTE — PROGRESS NOTES
1. Have you been to the ER, urgent care clinic since your last visit? Hospitalized since your last visit? No    2. Have you seen or consulted any other health care providers outside of the [address] [place] [address] since your last visit? Include any pap smears or colon screening.  No   Chief Complaint   Patient presents with   [facility].

## 2018-10-24 NOTE — PATIENT INSTRUCTIONS

## 2018-10-25 LAB
ALBUMIN SERPL-MCNC: 4 G/DL (ref 3.6–4.8)
ALBUMIN/CREAT UR: 4.6 MG/G CREAT (ref 0–30)
ALBUMIN/GLOB SERPL: 1.5 {RATIO} (ref 1.2–2.2)
ALP SERPL-CCNC: 87 IU/L (ref 39–117)
ALT SERPL-CCNC: 13 IU/L (ref 0–32)
AST SERPL-CCNC: 13 IU/L (ref 0–40)
BASOPHILS # BLD AUTO: 0 X10E3/UL (ref 0–0.2)
BASOPHILS NFR BLD AUTO: 1 %
BILIRUB SERPL-MCNC: 0.2 MG/DL (ref 0–1.2)
BUN SERPL-MCNC: 18 MG/DL (ref 8–27)
BUN/CREAT SERPL: 23 (ref 12–28)
CALCIUM SERPL-MCNC: 9.1 MG/DL (ref 8.7–10.3)
CHLORIDE SERPL-SCNC: 92 MMOL/L (ref 96–106)
CHOLEST SERPL-MCNC: 189 MG/DL (ref 100–199)
CO2 SERPL-SCNC: 26 MMOL/L (ref 20–29)
CREAT SERPL-MCNC: 0.79 MG/DL (ref 0.57–1)
CREAT UR-MCNC: 183.5 MG/DL
EOSINOPHIL # BLD AUTO: 0.1 X10E3/UL (ref 0–0.4)
EOSINOPHIL NFR BLD AUTO: 2 %
ERYTHROCYTE [DISTWIDTH] IN BLOOD BY AUTOMATED COUNT: 13.3 % (ref 12.3–15.4)
EST. AVERAGE GLUCOSE BLD GHB EST-MCNC: 246 MG/DL
GLOBULIN SER CALC-MCNC: 2.6 G/DL (ref 1.5–4.5)
GLUCOSE SERPL-MCNC: 221 MG/DL (ref 65–99)
HBA1C MFR BLD: 10.2 % (ref 4.8–5.6)
HCT VFR BLD AUTO: 39.3 % (ref 34–46.6)
HDLC SERPL-MCNC: 44 MG/DL
HGB BLD-MCNC: 13.3 G/DL (ref 11.1–15.9)
IMM GRANULOCYTES # BLD: 0 X10E3/UL (ref 0–0.1)
IMM GRANULOCYTES NFR BLD: 1 %
INTERPRETATION, 910389: NORMAL
LDLC SERPL CALC-MCNC: 113 MG/DL (ref 0–99)
LYMPHOCYTES # BLD AUTO: 2.4 X10E3/UL (ref 0.7–3.1)
LYMPHOCYTES NFR BLD AUTO: 44 %
Lab: NORMAL
MCH RBC QN AUTO: 30 PG (ref 26.6–33)
MCHC RBC AUTO-ENTMCNC: 33.8 G/DL (ref 31.5–35.7)
MCV RBC AUTO: 89 FL (ref 79–97)
MICROALBUMIN UR-MCNC: 8.5 UG/ML
MONOCYTES # BLD AUTO: 0.5 X10E3/UL (ref 0.1–0.9)
MONOCYTES NFR BLD AUTO: 8 %
NEUTROPHILS # BLD AUTO: 2.5 X10E3/UL (ref 1.4–7)
NEUTROPHILS NFR BLD AUTO: 44 %
PLATELET # BLD AUTO: 217 X10E3/UL (ref 150–379)
POTASSIUM SERPL-SCNC: 4.2 MMOL/L (ref 3.5–5.2)
PROT SERPL-MCNC: 6.6 G/DL (ref 6–8.5)
RBC # BLD AUTO: 4.43 X10E6/UL (ref 3.77–5.28)
SODIUM SERPL-SCNC: 135 MMOL/L (ref 134–144)
TRIGL SERPL-MCNC: 160 MG/DL (ref 0–149)
VALPROATE SERPL-MCNC: 128 UG/ML (ref 50–100)
VLDLC SERPL CALC-MCNC: 32 MG/DL (ref 5–40)
WBC # BLD AUTO: 5.4 X10E3/UL (ref 3.4–10.8)

## 2018-10-25 NOTE — PROGRESS NOTES
Please inform pt:   1.   Uncontrolled diabetes and cholesterol - F/U in office to discuss changes in management  2.  Valproic acid levels too high, contact Neuro to make them aware and see if they want to change medication dosage  All else normal.  Thanks,  N

## 2018-10-26 ENCOUNTER — DOCUMENTATION ONLY (OUTPATIENT)
Dept: FAMILY MEDICINE CLINIC | Age: 63
End: 2018-10-26

## 2018-10-29 NOTE — PROGRESS NOTES
LVM to return call regarding pharmacists in charge rx request. Need to know if its a scam or if pt wants us to fill out this request.

## 2018-11-05 ENCOUNTER — DOCUMENTATION ONLY (OUTPATIENT)
Dept: FAMILY MEDICINE CLINIC | Age: 63
End: 2018-11-05

## 2018-11-09 ENCOUNTER — OFFICE VISIT (OUTPATIENT)
Dept: FAMILY MEDICINE CLINIC | Age: 63
End: 2018-11-09

## 2018-11-09 VITALS
HEART RATE: 79 BPM | HEIGHT: 61 IN | TEMPERATURE: 98.2 F | DIASTOLIC BLOOD PRESSURE: 83 MMHG | SYSTOLIC BLOOD PRESSURE: 138 MMHG | BODY MASS INDEX: 35.68 KG/M2 | OXYGEN SATURATION: 98 % | WEIGHT: 189 LBS | RESPIRATION RATE: 18 BRPM

## 2018-11-09 DIAGNOSIS — E11.65 UNCONTROLLED TYPE 2 DIABETES MELLITUS WITH HYPERGLYCEMIA (HCC): Primary | ICD-10-CM

## 2018-11-09 RX ORDER — INSULIN PUMP SYRINGE, 3 ML
EACH MISCELLANEOUS
Qty: 1 KIT | Refills: 0 | Status: SHIPPED | OUTPATIENT
Start: 2018-11-09 | End: 2019-02-06 | Stop reason: SDUPTHER

## 2018-11-09 NOTE — PATIENT INSTRUCTIONS
Testing BS twice daily - once when wakes up fasting and again 2 hrs after any meal.    Keep log of blood sugars and let me know in 1 week what she is looking like on just Janumet alone, unless sugars extremely high in which case let me know immediately. Learning About Diabetes Food Guidelines  Your Care Instructions    Meal planning is important to manage diabetes. It helps keep your blood sugar at a target level (which you set with your doctor). You don't have to eat special foods. You can eat what your family eats, including sweets once in a while. But you do have to pay attention to how often you eat and how much you eat of certain foods. You may want to work with a dietitian or a certified diabetes educator (CDE) to help you plan meals and snacks. A dietitian or CDE can also help you lose weight if that is one of your goals. What should you know about eating carbs? Managing the amount of carbohydrate (carbs) you eat is an important part of healthy meals when you have diabetes. Carbohydrate is found in many foods. · Learn which foods have carbs. And learn the amounts of carbs in different foods. ? Bread, cereal, pasta, and rice have about 15 grams of carbs in a serving. A serving is 1 slice of bread (1 ounce), ½ cup of cooked cereal, or 1/3 cup of cooked pasta or rice. ? Fruits have 15 grams of carbs in a serving. A serving is 1 small fresh fruit, such as an apple or orange; ½ of a banana; ½ cup of cooked or canned fruit; ½ cup of fruit juice; 1 cup of melon or raspberries; or 2 tablespoons of dried fruit. ? Milk and no-sugar-added yogurt have 15 grams of carbs in a serving. A serving is 1 cup of milk or 2/3 cup of no-sugar-added yogurt. ? Starchy vegetables have 15 grams of carbs in a serving. A serving is ½ cup of mashed potatoes or sweet potato; 1 cup winter squash; ½ of a small baked potato; ½ cup of cooked beans; or ½ cup cooked corn or green peas.   · Learn how much carbs to eat each day and at each meal. A dietitian or CDE can teach you how to keep track of the amount of carbs you eat. This is called carbohydrate counting. · If you are not sure how to count carbohydrate grams, use the Plate Method to plan meals. It is a good, quick way to make sure that you have a balanced meal. It also helps you spread carbs throughout the day. ? Divide your plate by types of foods. Put non-starchy vegetables on half the plate, meat or other protein food on one-quarter of the plate, and a grain or starchy vegetable in the final quarter of the plate. To this you can add a small piece of fruit and 1 cup of milk or yogurt, depending on how many carbs you are supposed to eat at a meal.  · Try to eat about the same amount of carbs at each meal. Do not \"save up\" your daily allowance of carbs to eat at one meal.  · Proteins have very little or no carbs per serving. Examples of proteins are beef, chicken, turkey, fish, eggs, tofu, cheese, cottage cheese, and peanut butter. A serving size of meat is 3 ounces, which is about the size of a deck of cards. Examples of meat substitute serving sizes (equal to 1 ounce of meat) are 1/4 cup of cottage cheese, 1 egg, 1 tablespoon of peanut butter, and ½ cup of tofu. How can you eat out and still eat healthy? · Learn to estimate the serving sizes of foods that have carbohydrate. If you measure food at home, it will be easier to estimate the amount in a serving of restaurant food. · If the meal you order has too much carbohydrate (such as potatoes, corn, or baked beans), ask to have a low-carbohydrate food instead. Ask for a salad or green vegetables. · If you use insulin, check your blood sugar before and after eating out to help you plan how much to eat in the future. · If you eat more carbohydrate at a meal than you had planned, take a walk or do other exercise. This will help lower your blood sugar. What else should you know?   · Limit saturated fat, such as the fat from meat and dairy products. This is a healthy choice because people who have diabetes are at higher risk of heart disease. So choose lean cuts of meat and nonfat or low-fat dairy products. Use olive or canola oil instead of butter or shortening when cooking. · Don't skip meals. Your blood sugar may drop too low if you skip meals and take insulin or certain medicines for diabetes. · Check with your doctor before you drink alcohol. Alcohol can cause your blood sugar to drop too low. Alcohol can also cause a bad reaction if you take certain diabetes medicines. Follow-up care is a key part of your treatment and safety. Be sure to make and go to all appointments, and call your doctor if you are having problems. It's also a good idea to know your test results and keep a list of the medicines you take. Where can you learn more? Go to http://soren-surjit.info/. Enter N763 in the search box to learn more about \"Learning About Diabetes Food Guidelines. \"  Current as of: December 7, 2017  Content Version: 11.8  © 7599-7846 [employer]. Care instructions adapted under license by [employer] (which disclaims liability or warranty for this information). If you have questions about a medical condition or this instruction, always ask your healthcare professional. [address] any warranty or liability for your use of this information.

## 2018-11-09 NOTE — PROGRESS NOTES
Chief Complaint   Patient presents with    Diabetes     f/u on unconrolled DM     she is a 58y.o. year old female who presents for evalution. Pt here to discuss DM. Checking BS in AM when wakes up, 2 days ago was up to 300, this morning was up to 150s. Does take Janumet twice daily, also takes Actos 30mg and Glipizide 10mg BID. Pt has been on medication for at least past 1-2 years. Pt tries to watch diet but cheats a lot. Usually only eats twice a day, sometimes once. Does not really exercise. Occasional episodes of hypoglycemia. Pt states was previously on Invokana but doesn't want to be on it due to possible chance of amputation. Reviewed PmHx, RxHx, FmHx, SocHx, AllgHx and updated and dated in the chart. Review of Systems - negative except as listed above in the HPI    Objective:     Vitals:    11/09/18 1024   BP: 138/83   Pulse: 79   Resp: 18   Temp: 98.2 °F (36.8 °C)   TempSrc: Oral   SpO2: 98%   Weight: 189 lb (85.7 kg)   Height: 5' 1\" (1.549 m)     Physical Examination: General appearance - alert, well appearing, and in no distress  Chest - clear to auscultation, no wheezes, rales or rhonchi, symmetric air entry  Heart - normal rate, regular rhythm, normal S1, S2, no murmurs, rubs, clicks or gallops  Extremities - peripheral pulses normal, no pedal edema, no clubbing or cyanosis    Assessment/ Plan:   Diagnoses and all orders for this visit:    1. Uncontrolled type 2 diabetes mellitus with hyperglycemia (HCC)  -     glucose blood VI test strips (ASCENSIA AUTODISC VI, ONE TOUCH ULTRA TEST VI) strip; Testing BID, dx: E11.65  -     insulin glargine-lixisenatide (SOLIQUA 100/33) 100 unit-33 mcg/mL inpn; 10 Units by SubCUTAneous route daily.  -     Blood-Glucose Meter monitoring kit; Testing BID, dx: E11.65  New rx. D/C Amaryl and Glipize and only use Janumet for next week - send me log of readings. Work on diet and exercise.   If uncontrolled on Janumet alone will switch to [place], pt voiced understanding. Discussed importance of diabetic diet, need for regular exercise. Reviewed disease process and complications that arise from poor control of blood sugars - kidney damage, blindness, amputation, paresthesias. Goal of HgbA1c below 7 and LDL below 100. Need for yearly eye exam and regular foot care. Pt voiced understanding regarding plan of care. Follow-up Disposition:  Return in about 1 month (around 12/9/2018), or if symptoms worsen or fail to improve. I have discussed the diagnosis with the patient and the intended plan as seen in the above orders. The patient has received an after-visit summary and questions were answered concerning future plans.      Medication Side Effects and Warnings were discussed with patient    Margarito Amin NP

## 2018-12-17 ENCOUNTER — OFFICE VISIT (OUTPATIENT)
Dept: NEUROLOGY | Age: 63
End: 2018-12-17

## 2018-12-17 ENCOUNTER — DOCUMENTATION ONLY (OUTPATIENT)
Dept: FAMILY MEDICINE CLINIC | Age: 63
End: 2018-12-17

## 2018-12-17 VITALS
OXYGEN SATURATION: 98 % | BODY MASS INDEX: 35.71 KG/M2 | WEIGHT: 189 LBS | RESPIRATION RATE: 14 BRPM | SYSTOLIC BLOOD PRESSURE: 130 MMHG | HEART RATE: 89 BPM | DIASTOLIC BLOOD PRESSURE: 94 MMHG

## 2018-12-17 DIAGNOSIS — G43.719 INTRACTABLE CHRONIC MIGRAINE WITHOUT AURA AND WITHOUT STATUS MIGRAINOSUS: ICD-10-CM

## 2018-12-17 DIAGNOSIS — M62.838 MUSCLE SPASM: ICD-10-CM

## 2018-12-17 DIAGNOSIS — G43.809 CERVICOGENIC MIGRAINE: ICD-10-CM

## 2018-12-17 RX ORDER — NORTRIPTYLINE HYDROCHLORIDE 25 MG/1
25 CAPSULE ORAL
Qty: 30 CAP | Refills: 3
Start: 2018-12-17 | End: 2019-05-29 | Stop reason: ALTCHOICE

## 2018-12-17 RX ORDER — CYCLOBENZAPRINE HCL 10 MG
10 TABLET ORAL 2 TIMES DAILY
Qty: 30 TAB | Refills: 3 | Status: SHIPPED | OUTPATIENT
Start: 2018-12-17 | End: 2019-02-06 | Stop reason: SDUPTHER

## 2018-12-17 NOTE — PROGRESS NOTES
Keegan Brandon has brought on migraines, gets 3 or 4 a month. Only taking aleve for them and then laying down, there all day. Not relieved. Nothing is getting done in the house and  is beginning to fuss at her for it.

## 2018-12-17 NOTE — PROGRESS NOTES
Date:  18     Name:  Philip Guzman  :  1955  MRN:  [id number]     PCP:  Philip Ham., NP    No chief complaint on file. HISTORY OF PRESENT ILLNESS:Follow up visit to headaches She notes  That since oct  She reports that she is about 3 migraines a week. She is current taking fexeril and it works she does not take it every night because it make her sleep. She notes that the flexeril is helping with her cerverigonic headaches. She had question about nortriplyine to see if she could try that as a preventative. As for her seizure, No recent seizure. She is Currently taking Depakote 250 mg tabs 3 in the morning and 3 in the evening. She endorses compliance   Headache Characterization: Shooting sharp  Pain Level:10 /10  Aura: yes   Frequency/Length: 3 a week . She reports that it will last about 6 hours  Location: she reports they are located  The back of her head  Nausea/Vomiting:   Yes/ no   Photophobia:  Yes   Phonophobia: yes   Provoking factors: light  Relieving factors: flexeril   Focal neurologic symptoms with headache:none   Recap  Follow up visit for Seizure. Patient reports she had not had any seizure since the last visit. She is Currently taking Depakote 250 mg tabs 3 in the morning and 3 in the evening. She endorses compliance. Lab work check by dr. Renaldo Landeros last visit. She reports having them completed, but we do not have a copy in our records. As for her headache, she recently was prescribed Flexeril for the muscle spasms in her neck. This was effective was wondering if she could switch muscle relaxants. Current Outpatient Medications   Medication Sig    nortriptyline (PAMELOR) 25 mg capsule Take 1 Cap by mouth nightly.  cyclobenzaprine (FLEXERIL) 10 mg tablet Take 1 Tab by mouth two (2) times a day.     glucose blood VI test strips (ASCENSIA AUTODISC VI, ONE TOUCH ULTRA TEST VI) strip Testing BID, dx: E11.65    Blood-Glucose Meter monitoring kit Testing BID, dx: E11.65  celecoxib (CELEBREX) 200 mg capsule Take  by mouth two (2) times a day.  omeprazole (PRILOSEC) 20 mg capsule Take 20 mg by mouth daily.  divalproex DR (DEPAKOTE) 250 mg tablet TAKE 3 TABLETS BY MOUTH EVERY MORNING AND 3 TABLETS EVERY EVENING    ROSUVASTATIN CALCIUM (CRESTOR PO) Take 40 mg by mouth.  PARoxetine (PAXIL) 20 mg tablet Take  by mouth daily.  ALPRAZOLAM (XANAX PO) Take  by mouth.  gabapentin (NEURONTIN) 300 mg capsule Take 300 mg by mouth nightly.  diphenhydrAMINE (BENADRYL ALLERGY) 25 mg tablet Take 25 mg by mouth every six (6) hours as needed.  pioglitazone (ACTOS) 15 mg tablet Take 30 mg by mouth daily. 10 MG BID     glipiZIDE (GLUCOTROL) 5 mg tablet Take 5 mg by mouth two (2) times a day.  cholecalciferol, vitamin d3, (VITAMIN D) 1,000 unit tablet Take 5,000 Units by mouth daily.  insulin glargine-lixisenatide (SOLIQUA 100/33) 100 unit-33 mcg/mL inpn 10 Units by SubCUTAneous route daily.  HYDROcodone-acetaminophen (NORCO) 7.5-325 mg per tablet Take  by mouth. No current facility-administered medications for this visit.       Allergies   Allergen Reactions    Flexall [Menthol-Aloe Vera Extract] Swelling    Sudafed [Pseudoephedrine Hcl] Other (comments)     Jittery      Past Medical History:   Diagnosis Date    Diabetic peripheral neuropathy associated with type 2 diabetes mellitus ([address]     Dyslipidemia     Headache(784.0)     Hyperlipidemia     Type II or unspecified type diabetes mellitus without mention of complication, uncontrolled     Unspecified epilepsy without mention of intractable epilepsy     Vision decreased      Past Surgical History:   Procedure Laterality Date    HX GYN      tubaligation    HX HEENT      cataract    HX ORTHOPAEDIC      both knees, L shoulder     HX OTHER SURGICAL  04/2016    stretched her esopaghus, recurrent issue     HX REFRACTIVE SURGERY  02/05/2012    right eye     Social History     Socioeconomic History    Marital status:      Spouse name: Not on file    Number of children: Not on file    Years of education: Not on file    Highest education level: Not on file   Social Needs    Financial resource strain: Not on file    Food insecurity - worry: Not on file    Food insecurity - inability: Not on file    Transportation needs - medical: Not on file   [employer] needs - non-medical: Not on file   Occupational History    Not on file   Tobacco Use    Smoking status: Never Smoker    Smokeless tobacco: Never Used   Substance and Sexual Activity    Alcohol use: No    Drug use: Not on file    Sexual activity: No     Partners: Male   Other Topics Concern    Not on file   Social History Narrative    Not on file     Family History   Problem Relation Age of Onset    Cancer Mother     Cancer Other     Heart Disease Father        PHYSICAL EXAMINATION:    Visit Vitals  BP (!) 130/94 (BP 1 Location: Left arm, BP Patient Position: Sitting)   Pulse 89   Resp 14   Wt 189 lb (85.7 kg)   SpO2 98%   BMI 35.71 kg/m²     General: Well defined, nourished, and groomed individual in no acute distress. Neck: Supple, nontender, no bruits, no pain with resistance to active range of motion. Heart: Regular rate and rhythm, no murmurs, rub, or gallop. Normal S1S2. Lungs: Clear to auscultation bilaterally with equal chest expansion, no cough, no wheeze  Musculoskeletal: Extremities revealed no edema and had full range of motion of joints. Psych: Good mood and bright affect      NEUROLOGICAL EXAMINATION:   Mental Status: Alert and oriented to person, place, and time       Cranial Nerves:   II, III, IV, VI: Visual acuity grossly intact. Visual fields are normal.   Pupils are equal, round, and reactive to light and accommodation. Extra-ocular movements are full and fluid. Fundoscopic exam was benign, no ptosis or nystagmus. V-XII: Hearing is grossly intact.  Facial features are symmetric, with normal sensation and strength. The palate rises symmetrically and the tongue protrudes midline. Sternocleidomastoids 5/5.       Motor Examination: Normal tone, bulk, and strength, 5/5 muscle strength throughout.       Coordination: No resting or intention tremor      Gait and Station: Steady while walking. Normal arm swing. No pronator drift. No muscle wasting or fasiculations noted.       Reflexes: DTRs 2+ throughout. ASSESSMENT AND PLAN    ICD-10-CM ICD-9-CM    1. Intractable chronic migraine without aura and without status migrainosus G43.719 346.71 nortriptyline (PAMELOR) 25 mg capsule      cyclobenzaprine (FLEXERIL) 10 mg tablet   2. Muscle spasm M62.838 728.85 cyclobenzaprine (FLEXERIL) 10 mg tablet   3. Cervicogenic migraine G43.809 346.80 cyclobenzaprine (FLEXERIL) 10 mg tablet    Cervicogenic migraines  Continue flexeril 10 mg     I will start her on a preventative . She will start nortriptyline 25 mg  Nightly . Purpose and  potential side effects were discussed and they have verbalized understanding. Seizure   Continue treatment   This note will not be viewable in [facility] [employer].   Sánchez Salinas NP

## 2019-01-28 ENCOUNTER — TELEPHONE (OUTPATIENT)
Dept: FAMILY MEDICINE CLINIC | Age: 64
End: 2019-01-28

## 2019-01-30 ENCOUNTER — HOSPITAL ENCOUNTER (OUTPATIENT)
Age: 64
Setting detail: OUTPATIENT SURGERY
Discharge: HOME | End: 2019-01-30
Attending: INTERNAL MEDICINE | Admitting: INTERNAL MEDICINE
Payer: MEDICARE

## 2019-01-30 ENCOUNTER — ANESTHESIA EVENT (OUTPATIENT)
Dept: ENDOSCOPY | Age: 64
End: 2019-01-30
Payer: MEDICARE

## 2019-01-30 ENCOUNTER — ANESTHESIA (OUTPATIENT)
Dept: ENDOSCOPY | Age: 64
End: 2019-01-30
Payer: MEDICARE

## 2019-01-30 VITALS
BODY MASS INDEX: 33.61 KG/M2 | OXYGEN SATURATION: 97 % | RESPIRATION RATE: 15 BRPM | HEART RATE: 85 BPM | TEMPERATURE: 97.9 F | WEIGHT: 178 LBS | DIASTOLIC BLOOD PRESSURE: 57 MMHG | HEIGHT: 61 IN | SYSTOLIC BLOOD PRESSURE: 104 MMHG

## 2019-01-30 LAB
COLONOSCOPY, EXTERNAL: NORMAL
GLUCOSE BLD STRIP.AUTO-MCNC: 282 MG/DL (ref 65–100)
SERVICE CMNT-IMP: ABNORMAL

## 2019-01-30 PROCEDURE — 74011250636 HC RX REV CODE- 250/636

## 2019-01-30 PROCEDURE — 76060000031 HC ANESTHESIA FIRST 0.5 HR: Performed by: INTERNAL MEDICINE

## 2019-01-30 PROCEDURE — C1726 CATH, BAL DIL, NON-VASCULAR: HCPCS | Performed by: INTERNAL MEDICINE

## 2019-01-30 PROCEDURE — 77030013992 HC SNR POLYP ENDOSC BSC -B: Performed by: INTERNAL MEDICINE

## 2019-01-30 PROCEDURE — 88305 TISSUE EXAM BY PATHOLOGIST: CPT

## 2019-01-30 PROCEDURE — 82962 GLUCOSE BLOOD TEST: CPT

## 2019-01-30 PROCEDURE — 77030009426 HC FCPS BIOP ENDOSC BSC -B: Performed by: INTERNAL MEDICINE

## 2019-01-30 PROCEDURE — 76040000019: Performed by: INTERNAL MEDICINE

## 2019-01-30 RX ORDER — PROPOFOL 10 MG/ML
INJECTION, EMULSION INTRAVENOUS AS NEEDED
Status: DISCONTINUED | OUTPATIENT
Start: 2019-01-30 | End: 2019-01-30 | Stop reason: HOSPADM

## 2019-01-30 RX ORDER — ATROPINE SULFATE 0.1 MG/ML
0.4 INJECTION INTRAVENOUS
Status: DISCONTINUED | OUTPATIENT
Start: 2019-01-30 | End: 2019-01-30 | Stop reason: HOSPADM

## 2019-01-30 RX ORDER — SODIUM CHLORIDE 9 MG/ML
50 INJECTION, SOLUTION INTRAVENOUS CONTINUOUS
Status: DISCONTINUED | OUTPATIENT
Start: 2019-01-30 | End: 2019-01-30 | Stop reason: HOSPADM

## 2019-01-30 RX ORDER — SODIUM CHLORIDE 9 MG/ML
INJECTION, SOLUTION INTRAVENOUS
Status: DISCONTINUED | OUTPATIENT
Start: 2019-01-30 | End: 2019-01-30 | Stop reason: HOSPADM

## 2019-01-30 RX ORDER — DEXTROMETHORPHAN/PSEUDOEPHED 2.5-7.5/.8
1.2 DROPS ORAL
Status: DISCONTINUED | OUTPATIENT
Start: 2019-01-30 | End: 2019-01-30 | Stop reason: HOSPADM

## 2019-01-30 RX ORDER — NALOXONE HYDROCHLORIDE 0.4 MG/ML
0.4 INJECTION, SOLUTION INTRAMUSCULAR; INTRAVENOUS; SUBCUTANEOUS
Status: DISCONTINUED | OUTPATIENT
Start: 2019-01-30 | End: 2019-01-30 | Stop reason: HOSPADM

## 2019-01-30 RX ORDER — PROPOFOL 10 MG/ML
INJECTION, EMULSION INTRAVENOUS
Status: DISCONTINUED | OUTPATIENT
Start: 2019-01-30 | End: 2019-01-30 | Stop reason: HOSPADM

## 2019-01-30 RX ORDER — EPINEPHRINE 0.1 MG/ML
1 INJECTION INTRACARDIAC; INTRAVENOUS
Status: DISCONTINUED | OUTPATIENT
Start: 2019-01-30 | End: 2019-01-30 | Stop reason: HOSPADM

## 2019-01-30 RX ORDER — FLUMAZENIL 0.1 MG/ML
0.2 INJECTION INTRAVENOUS
Status: DISCONTINUED | OUTPATIENT
Start: 2019-01-30 | End: 2019-01-30 | Stop reason: HOSPADM

## 2019-01-30 RX ORDER — LIDOCAINE HYDROCHLORIDE 20 MG/ML
INJECTION, SOLUTION EPIDURAL; INFILTRATION; INTRACAUDAL; PERINEURAL AS NEEDED
Status: DISCONTINUED | OUTPATIENT
Start: 2019-01-30 | End: 2019-01-30 | Stop reason: HOSPADM

## 2019-01-30 RX ORDER — MIDAZOLAM HYDROCHLORIDE 1 MG/ML
.25-5 INJECTION, SOLUTION INTRAMUSCULAR; INTRAVENOUS
Status: DISCONTINUED | OUTPATIENT
Start: 2019-01-30 | End: 2019-01-30 | Stop reason: HOSPADM

## 2019-01-30 RX ADMIN — SODIUM CHLORIDE: 9 INJECTION, SOLUTION INTRAVENOUS at 14:55

## 2019-01-30 RX ADMIN — PROPOFOL 50 MG: 10 INJECTION, EMULSION INTRAVENOUS at 15:31

## 2019-01-30 RX ADMIN — PROPOFOL 50 MG: 10 INJECTION, EMULSION INTRAVENOUS at 15:23

## 2019-01-30 RX ADMIN — PROPOFOL 50 MG: 10 INJECTION, EMULSION INTRAVENOUS at 15:16

## 2019-01-30 RX ADMIN — PROPOFOL 50 MG: 10 INJECTION, EMULSION INTRAVENOUS at 15:27

## 2019-01-30 RX ADMIN — PROPOFOL 50 MG: 10 INJECTION, EMULSION INTRAVENOUS at 15:25

## 2019-01-30 RX ADMIN — PROPOFOL 70 MG: 10 INJECTION, EMULSION INTRAVENOUS at 15:14

## 2019-01-30 RX ADMIN — PROPOFOL 50 MG: 10 INJECTION, EMULSION INTRAVENOUS at 15:18

## 2019-01-30 RX ADMIN — PROPOFOL 50 MG: 10 INJECTION, EMULSION INTRAVENOUS at 15:29

## 2019-01-30 RX ADMIN — PROPOFOL 50 MG: 10 INJECTION, EMULSION INTRAVENOUS at 15:20

## 2019-01-30 RX ADMIN — PROPOFOL 140 MCG/KG/MIN: 10 INJECTION, EMULSION INTRAVENOUS at 15:13

## 2019-01-30 RX ADMIN — LIDOCAINE HYDROCHLORIDE 20 MG: 20 INJECTION, SOLUTION EPIDURAL; INFILTRATION; INTRACAUDAL; PERINEURAL at 15:14

## 2019-01-30 RX ADMIN — PROPOFOL 30 MG: 10 INJECTION, EMULSION INTRAVENOUS at 15:19

## 2019-01-30 NOTE — PROCEDURES
Ange Abarca M.D.  [phone]            2019          Colonoscopy Operative Report  Brady Lopez  :  1955  [facility] Record Number:  [id number]      Indications:  abdominal pain     :  Judy Banuelos MD    Assistant -- None  Implants -- None    Referring Provider: Kay Valdez, NP    Sedation:  MAC anesthesia Propofol    Pre-Procedural Exam:      Airway: clear,  No airway problems anticipated  Heart: RRR, without gallops or rubs  Lungs: clear bilaterally without wheezes, crackles, or rhonchi  Abdomen: soft, nontender, nondistended, bowel sounds present  Mental Status: awake, alert and oriented to person, place and time     Procedure Details:  After informed consent was obtained with all risks and benefits of procedure explained and preoperative exam completed, the patient was taken to the endoscopy suite and placed in the left lateral decubitus position. Upon sequential sedation as per above, a digital rectal exam was performed. The Olympus videocolonoscope  was inserted in the rectum and carefully advanced to the terminal ileum. The quality of preparation was good. The colonoscope was slowly withdrawn with careful inspection and evaluation between folds. Retroflexion in the rectum was performed. Findings:   Terminal Ileum: normal  Cecum: normal  Ascending Colon: normal  Transverse Colon: 1  Sessile polyp(s), the largest 5 mm in size; Descending Colon: normal  Sigmoid:     - Diverticulosis  Rectum: normal    Interventions:  1 complete polypectomy were performed using cold biopsy forceps and the polyps were  retrieved    Specimen Removed:  specimen #1, 5 mm in size, located in the transverse colon removed by cold biopsy and sent for pathology    Complications: None. EBL:  None.     Impression:  Mild diverticulosis noted in the sigmoid colon                         One polyp removed from the transverse colon    Recommendations:  -Await pathology. -If adenoma is present, repeat colonoscopy in 3 years.   -High fiber diet.    -Resume normal medication(s). Discharge Disposition:  Home in the company of a  when able to ambulate.     Gisell Celestin MD  1/30/2019  3:39 PM

## 2019-01-30 NOTE — PROCEDURES
Tessa Madison M.D.  [phone]           2019                EGD Operative Report  Theron Jason  :  1955  [address] [place] [address] Record Number:  [id number]      Indication: Dysphagia     : Giesll Celestin MD    Assistant -- None  Implants -- None    Referring Provider:  Vilma Restrepo, NP      Anesthesia/Sedation:  MAC anesthesia Propofol    Airway assessment: No airway problems anticipated    Pre-Procedural Exam:      Airway: clear, no airway problems anticipated  Heart: RRR, without gallops or rubs  Lungs: clear bilaterally without wheezes, crackles, or rhonchi  Abdomen: soft, nontender, nondistended, bowel sounds present  Mental Status: awake, alert and oriented to person, place and time       Procedure Details     After infomed consent was obtained for the procedure, with all risks and benefits of procedure explained the patient was taken to the endoscopy suite and placed in the left lateral decubitus position. Following sequential administration of sedation as per above, the endoscope was inserted into the mouth and advanced under direct vision to second portion of the duodenum. A careful inspection was made as the gastroscope was withdrawn, including a retroflexed view of the proximal stomach; findings and interventions are described below. Findings:   Esophagus:A schatzkis ring was noted at the GEJ. This was disrupted with a 20mm balloon  Stomach: erosions were noted in the gastric body. Biopsies obtained to rule out H.pylori infection  Duodenum/jejunum: normal    Therapies:  esophageal dilation with 20mm sized balloon  biopsy of stomach     Specimens: gastric biopsies           Complications:   None; patient tolerated the procedure well. EBL:  None. Impression:    A non-obstructing Schatzki's ring was found and disrupted with a 20mm ballon                           Moderate gastritis noted.  Biopsies obtained Recommendations:    -Acid suppression with a proton pump inhibitor. ,   -Await pathology. ,   -proceed with colonoscopy today    Bishop Makayla MD

## 2019-01-30 NOTE — H&P
Alberta Dickey M.D. 
[phone] History and Physical    
 
NAME:  Shaye Carter :   1955 MRN:   [id number] Referring Physician:  Eitan Boggs NP Consult Date: 2019 9:35 AM 
 
Chief Complaint:  Abdominal pain and dysphagia History of Present Illness: The patient is a 71year old female who presents with a complaint of Abdominal Pain. The patient presents for initial presentation of symptoms. The onset of the abdominal pain has been gradual and has been occurring in a persistent pattern for 2 weeks with a increasing course . The abdominal pain is described as moderate burning and sharp pain and  is described as being located in the epigastrium .  The symptoms have been associated with abdominal distention, bloating and heartburn,  while the symptoms have not been associated with amenorrhea, family history of colon cancer, fever, hematemesis, jaundice, melena or nausea. The patient had a colonoscopy 10 years ago .  The patient has been using no medications for this particular complaint/condition. Note for \"Abdominal Pain\": Comes in for evaluation of epigastric and LUQ abdm pain, heartburn and dysphagia. Intermittent, dysphagia - worse with solid foods only Associated with heartburn Progressively getting worse/more frequent over past two weeks States she has had EGD with dilatation by Dr Bozena Jovel in past 
Colonoscopy done greater than 10 years ago Denies any change in weight, appetite or bowel habits PMH: 
Past Medical History:  
Diagnosis Date  Diabetic peripheral neuropathy associated with type 2 diabetes mellitus ([address]  Dyslipidemia  Hx of migraines  Hyperlipidemia  Type II or unspecified type diabetes mellitus without mention of complication, uncontrolled  Unspecified epilepsy without mention of intractable epilepsy  Vision decreased PSH: 
Past Surgical History:  
Procedure Laterality Date  HX CATARACT REMOVAL Bilateral   
 HX COLONOSCOPY    
 HX ENDOSCOPY  04/2016 EGD, stretched her esopaghus, recurrent issue  HX GYN Bilateral   
 tubaligation  HX ORTHOPAEDIC    
 both knees, L shoulder  HX REFRACTIVE SURGERY  02/05/2012  
 right eye  HX RETINAL DETACHMENT REPAIR Bilateral   
 
 
Allergies: Allergies Allergen Reactions  Flexall [Menthol-Aloe Vera Extract] Swelling  Sudafed [Pseudoephedrine Hcl] Other (comments) Jittery Home Medications: 
Cannot display prior to admission medications because the patient has not been admitted in this contact. Hospital Medications: No current facility-administered medications for this encounter. Current Outpatient Medications Medication Sig  cyclobenzaprine (FLEXERIL) 10 mg tablet Take 1 Tab by mouth two (2) times a day.  celecoxib (CELEBREX) 200 mg capsule Take  by mouth two (2) times a day.  divalproex DR (DEPAKOTE) 250 mg tablet TAKE 3 TABLETS BY MOUTH EVERY MORNING AND 3 TABLETS EVERY EVENING  
 ROSUVASTATIN CALCIUM (CRESTOR PO) Take 40 mg by mouth.  PARoxetine (PAXIL) 20 mg tablet Take  by mouth daily.  ALPRAZOLAM (XANAX PO) Take  by mouth.  gabapentin (NEURONTIN) 300 mg capsule Take 300 mg by mouth nightly.  diphenhydrAMINE (BENADRYL ALLERGY) 25 mg tablet Take 25 mg by mouth every six (6) hours as needed.  pioglitazone (ACTOS) 15 mg tablet Take 30 mg by mouth daily. 10 MG BID   
 glipiZIDE (GLUCOTROL) 5 mg tablet Take 5 mg by mouth two (2) times a day.  cholecalciferol, vitamin d3, (VITAMIN D) 1,000 unit tablet Take 5,000 Units by mouth daily.  nortriptyline (PAMELOR) 25 mg capsule Take 1 Cap by mouth nightly.  glucose blood VI test strips (ASCENSIA AUTODISC VI, ONE TOUCH ULTRA TEST VI) strip Testing BID, dx: E11.65  
 insulin glargine-lixisenatide (SOLIQUA 100/33) 100 unit-33 mcg/mL inpn 10 Units by SubCUTAneous route daily.  Blood-Glucose Meter monitoring kit Testing BID, dx: E11.65  
 omeprazole (PRILOSEC) 20 mg capsule Take 20 mg by mouth daily.  HYDROcodone-acetaminophen (NORCO) 7.5-325 mg per tablet Take  by mouth. Social History: 
Social History Tobacco Use  Smoking status: Never Smoker  Smokeless tobacco: Never Used Substance Use Topics  Alcohol use: No  
 
 
Family History: 
Family History Problem Relation Age of Onset  Cancer Mother  Cancer Other  Heart Disease Father Review of Systems: 
 
 
Constitutional: negative fever, negative chills, negative weight loss Eyes:   negative visual changes ENT:   negative sore throat, tongue or lip swelling Respiratory:  negative cough, negative dyspnea Cards:  negative for chest pain, palpitations, lower extremity edema GI:   See HPI 
:  negative for frequency, dysuria Integument:  negative for rash and pruritus Heme:  negative for easy bruising and gum/nose bleeding Musculoskel: negative for myalgias,  back pain and muscle weakness Neuro: negative for headaches, dizziness, vertigo Psych:  negative for feelings of anxiety, depression Objective:  
No data found. No intake/output data recorded. No intake/output data recorded. EXAM:   
 NEURO-a&o HEENT-wnl LUNGS-clear COR-regular rate and rhythym ABD-soft , no tenderness, no rebound, good bs EXT-no edema Data Review No results for input(s): WBC, HGB, HCT, PLT, HGBEXT, HCTEXT, PLTEXT in the last 72 hours. No results for input(s): NA, K, CL, CO2, BUN, CREA, GLU, PHOS, CA in the last 72 hours. No results for input(s): SGOT, GPT, AP, TBIL, TP, ALB, GLOB, GGT, AML, LPSE in the last 72 hours. No lab exists for component: AMYP, HLPSE No results for input(s): INR, PTP, APTT in the last 72 hours. No lab exists for component: INREXT Patient Active Problem List  
Diagnosis Code  Partial epilepsy with impairment of consciousness, intractable ([facility] G40.219  Intractable migraine without aura G43.019  
 Headache(784.0) R51  
 Post concussive syndrome F07.81  Type II or unspecified type diabetes mellitus without mention of complication, uncontrolled E11.65  Hyperlipidemia E78.5  Diabetic peripheral neuropathy associated with type 2 diabetes mellitus (HCC) E11.42  Severe obesity (BMI 35.0-39. 9) E66.01  
 Legal blindness, as defined in [place] [facility] H54.8 Assessment:  
· Generalized abdominal pain and dysphagia Plan: · EGD & Colonoscopy today.   
 
Signed By: Althea Verduzco MD   
 1/30/2019  9:35 AM

## 2019-01-30 NOTE — PERIOP NOTES
Patient resting comfortably on stretcher, HOB elevated, VS stable, call bell within reach, waiting for procedure start, will continue to monitor pt. Initial RN admission and assessment performed and documented in Endoscopy navigator. Patient evaluated by anesthesia in pre-procedure holding. All procedural vital signs, airway assessment, and level of consciousness information monitored and recorded by anesthesia staff on the anesthesia record.

## 2019-01-30 NOTE — ANESTHESIA PREPROCEDURE EVALUATION
Anesthetic History No history of anesthetic complications Review of Systems / Medical History Patient summary reviewed, nursing notes reviewed and pertinent labs reviewed Pulmonary Within defined limits Neuro/Psych  
 
seizures Cardiovascular Within defined limits GI/Hepatic/Renal 
Within defined limits Endo/Other Diabetes Morbid obesity Other Findings Physical Exam 
 
Airway Mallampati: II 
 
Neck ROM: normal range of motion Mouth opening: Normal 
 
 Cardiovascular Rhythm: regular Rate: normal 
 
 
 
 Dental 
 
Dentition: Poor dentition Pulmonary Breath sounds clear to auscultation Abdominal 
GI exam deferred Other Findings Anesthetic Plan ASA: 3 Anesthesia type: MAC Induction: Intravenous Anesthetic plan and risks discussed with: Patient

## 2019-01-30 NOTE — ROUTINE PROCESS
[address] 1955 [id number] Situation: 
Verbal report received from: Luz Block RN Procedure: Procedure(s): 
COLONOSCOPY 
ESOPHAGOGASTRODUODENOSCOPY (EGD) ESOPHAGEAL DILATION 
ESOPHAGOGASTRODUODENAL (EGD) BIOPSY ENDOSCOPIC POLYPECTOMY Background: 
 
Preoperative diagnosis: DYSPHAGIA, ABD PAIN Postoperative diagnosis: transverse colon polyp 
diverticulosis EGD- schiatzki's ring and gastritis :  Dr. Eugene Parra Assistant(s): Endoscopy RN-1: Twan Castorena RN Specimens:  
ID Type Source Tests Collected by Time Destination 1 : gastric biopsy Preservative   Karis Hernández MD 1/30/2019 1522 Pathology 2 : Transverse colon polyp Preservative Colon, Bijanlyric Hernández MD 1/30/2019 1529 Pathology H. Pylori  no Assessment: 
Intra-procedure medications Anesthesia gave intra-procedure sedation and medications, see anesthesia flow sheet yes Intravenous fluids: NS@ [place] Vital signs stable Abdominal assessment: round and soft Recommendation: 
Discharge patient per MD order. Family or Friend Permission to share finding with family or friend yes

## 2019-01-30 NOTE — ANESTHESIA POSTPROCEDURE EVALUATION
Procedure(s): 
COLONOSCOPY 
ESOPHAGOGASTRODUODENOSCOPY (EGD) ESOPHAGEAL DILATION 
ESOPHAGOGASTRODUODENAL (EGD) BIOPSY ENDOSCOPIC POLYPECTOMY. Anesthesia Post Evaluation Multimodal analgesia: multimodal analgesia not used between 6 hours prior to anesthesia start to PACU discharge Patient location during evaluation: PACU Patient participation: complete - patient participated Level of consciousness: sleepy but conscious Pain score: 0 Pain management: adequate Airway patency: patent Anesthetic complications: no 
Cardiovascular status: hemodynamically stable and acceptable Respiratory status: acceptable Hydration status: acceptable Comments: Patient seen and evaluated; no concerns. Post anesthesia nausea and vomiting:  none Visit Vitals /58 Pulse 90 Temp 36.6 °C (97.9 °F) Resp 19 Ht 5' 1\" (1.549 m) Wt 80.7 kg (178 lb) SpO2 96% Breastfeeding? No  
BMI 33.63 kg/m²

## 2019-01-30 NOTE — PERIOP NOTES
Received report from Via Dirk Cifuentes CRNA, see anesthesia records. Patient ABD remains soft and non-tender post procedure. Pt has no complaints at this time and tolerated the procedure well. Endoscope was pre-cleaned at bedside immediately [facility] Post-recovery report given to BULL Givens. CRE balloon dilatation of the esophagus 18 mm Balloon inflated to 3 ATMs and held for 15 seconds. 19 mm Balloon inflated to 4.5 ATMs and held for 15 seconds. 20 mm Balloon inflated to 6 ATMs and held for 30 seconds. No subcutaneous crepitus of the chest or cervical region was noted post dilatation.

## 2019-01-30 NOTE — DISCHARGE INSTRUCTIONS
[facility]. Kajal Wilson M.D.  [phone]                 COLON and EGD DISCHARGE INSTRUCTIONS    2019    Jose J Méndez  :  1955  [place] Medical Record Number:  [id number]      DISCOMFORT:  Sore throat- throat lozenges or warm salt water gargle  Redness at IV site- apply warm compress to area; if redness or soreness persist- contact your physician  There may be a slight amount of blood passed from the rectum  Gaseous discomfort- walking, belching will help relieve any discomfort  You may not operate a vehicle for 12 hours  You may not engage in an occupation involving machinery or appliances for rest of today  You may not drink alcoholic beverages for at least 12 hours  Avoid making any critical decisions for at least 24 hour  DIET:   High fiber diet. - however -  remember your colon is empty and a heavy meal will produce gas. Avoid these foods:  vegetables, fried / greasy foods, carbonated drinks for today     ACTIVITY:  You may  resume your normal daily activities it is recommended that you spend the remainder of the day resting -  avoid any strenuous activity and driving. CALL M.D. ANY SIGN OF:   Increasing pain, nausea, vomiting  Abdominal distension (swelling)  New increased bleeding (oral or rectal)  Fever (chills)  Pain in chest area  Bloody discharge from nose or mouth  Shortness of breath      Follow-up Instructions:   Call Dr. Becky Treviño if any questions at [phone]. Results of procedure / biopsy in 7 to 10 days, we will call you with these results.   Your endoscopy showed gastritis and a Schatzkis ring   Your colonoscopy showed 1 polyp and diverticulosis

## 2019-02-06 ENCOUNTER — HOSPITAL ENCOUNTER (OUTPATIENT)
Dept: LAB | Age: 64
Discharge: HOME | End: 2019-02-06
Payer: MEDICARE

## 2019-02-06 ENCOUNTER — OFFICE VISIT (OUTPATIENT)
Dept: FAMILY MEDICINE CLINIC | Age: 64
End: 2019-02-06

## 2019-02-06 VITALS
WEIGHT: 179.6 LBS | RESPIRATION RATE: 17 BRPM | TEMPERATURE: 97.9 F | HEIGHT: 61 IN | DIASTOLIC BLOOD PRESSURE: 83 MMHG | HEART RATE: 87 BPM | OXYGEN SATURATION: 93 % | SYSTOLIC BLOOD PRESSURE: 137 MMHG | BODY MASS INDEX: 33.91 KG/M2

## 2019-02-06 DIAGNOSIS — R05.9 COUGH: ICD-10-CM

## 2019-02-06 DIAGNOSIS — J06.9 ACUTE URI: ICD-10-CM

## 2019-02-06 DIAGNOSIS — K21.00 GASTROESOPHAGEAL REFLUX DISEASE WITH ESOPHAGITIS: ICD-10-CM

## 2019-02-06 DIAGNOSIS — G43.719 INTRACTABLE CHRONIC MIGRAINE WITHOUT AURA AND WITHOUT STATUS MIGRAINOSUS: ICD-10-CM

## 2019-02-06 DIAGNOSIS — E78.00 PURE HYPERCHOLESTEROLEMIA: ICD-10-CM

## 2019-02-06 DIAGNOSIS — E11.65 UNCONTROLLED TYPE 2 DIABETES MELLITUS WITH HYPERGLYCEMIA (HCC): Primary | ICD-10-CM

## 2019-02-06 DIAGNOSIS — M62.838 MUSCLE SPASM: ICD-10-CM

## 2019-02-06 DIAGNOSIS — G43.809 CERVICOGENIC MIGRAINE: ICD-10-CM

## 2019-02-06 PROCEDURE — 83036 HEMOGLOBIN GLYCOSYLATED A1C: CPT

## 2019-02-06 PROCEDURE — 80053 COMPREHEN METABOLIC PANEL: CPT

## 2019-02-06 PROCEDURE — 80061 LIPID PANEL: CPT

## 2019-02-06 RX ORDER — INSULIN PUMP SYRINGE, 3 ML
EACH MISCELLANEOUS
Qty: 1 KIT | Refills: 0 | Status: SHIPPED | OUTPATIENT
Start: 2019-02-06

## 2019-02-06 RX ORDER — AZITHROMYCIN 250 MG/1
TABLET, FILM COATED ORAL
Qty: 6 TAB | Refills: 0 | Status: SHIPPED | OUTPATIENT
Start: 2019-02-06 | End: 2019-05-29 | Stop reason: ALTCHOICE

## 2019-02-06 RX ORDER — FLUCONAZOLE 150 MG/1
150 TABLET ORAL DAILY
Qty: 2 TAB | Refills: 0 | Status: SHIPPED | OUTPATIENT
Start: 2019-02-06 | End: 2019-02-07

## 2019-02-06 RX ORDER — LANCETS
EACH MISCELLANEOUS
Qty: 1 EACH | Refills: 11 | Status: SHIPPED | OUTPATIENT
Start: 2019-02-06 | End: 2021-03-25 | Stop reason: SDUPTHER

## 2019-02-06 RX ORDER — OMEPRAZOLE 20 MG/1
20 CAPSULE, DELAYED RELEASE ORAL DAILY
Qty: 90 CAP | Refills: 1 | Status: SHIPPED | OUTPATIENT
Start: 2019-02-06 | End: 2021-03-25 | Stop reason: SDUPTHER

## 2019-02-06 RX ORDER — NALOXONE HYDROCHLORIDE 4 MG/.1ML
SPRAY NASAL
Qty: 1 EACH | Refills: 0 | Status: SHIPPED | OUTPATIENT
Start: 2019-02-06 | End: 2022-03-09 | Stop reason: ALTCHOICE

## 2019-02-06 RX ORDER — METFORMIN HYDROCHLORIDE 500 MG/1
TABLET, EXTENDED RELEASE ORAL
Qty: 360 TAB | Refills: 1 | Status: SHIPPED | OUTPATIENT
Start: 2019-02-06 | End: 2019-11-15 | Stop reason: SDUPTHER

## 2019-02-06 RX ORDER — CODEINE PHOSPHATE AND GUAIFENESIN 10; 100 MG/5ML; MG/5ML
5 SOLUTION ORAL
Qty: 120 ML | Refills: 0 | Status: SHIPPED | OUTPATIENT
Start: 2019-02-06 | End: 2019-05-29 | Stop reason: ALTCHOICE

## 2019-02-06 RX ORDER — ROSUVASTATIN CALCIUM 40 MG/1
40 TABLET, COATED ORAL
Qty: 90 TAB | Refills: 1 | Status: SHIPPED | OUTPATIENT
Start: 2019-02-06 | End: 2019-05-29 | Stop reason: SDUPTHER

## 2019-02-06 RX ORDER — CYCLOBENZAPRINE HCL 10 MG
10 TABLET ORAL
Qty: 180 TAB | Refills: 1 | Status: SHIPPED | OUTPATIENT
Start: 2019-02-06 | End: 2020-02-12 | Stop reason: SDUPTHER

## 2019-02-06 NOTE — PATIENT INSTRUCTIONS

## 2019-02-06 NOTE — PROGRESS NOTES
Chief Complaint   Patient presents with    Diabetes    Epilepsy     Not taking Nortriptyline    Medication Refill     Send to C[facility]     X2 weeks     1. Have you been to the ER, urgent care clinic since your last visit? Hospitalized since your last visit? No    2. Have you seen or consulted any other health care providers outside of the [facility] since your last visit? Include any pap smears or colon screening.  No

## 2019-02-06 NOTE — PROGRESS NOTES
Chief Complaint   Patient presents with    Diabetes    Epilepsy     Not taking Nortriptyline    Medication Refill     Send to Cindy Huffman     X2 weeks     she is a 71y.o. year old female who presents for evalution. Pt had endoscopy and colonoscopy last week. Had esophagus dilated, also states esophagus was scratched. Pt states having some trouble eating solid foods. Pt states BS at home have been over 200s - average 270s. Pt states has not been taking her Soliqua medication, was not covered at pharmacy.  with pt today and states they are starting to get medication from [facility]. Pt eats 2 meals a day - eats breakfast and dinner, snacks throughout day.  also diabetic so is trying to work on diet at home. No episodes of hypoglycemia. No vision changes. No pain in hands or feet.  states pt has been on Glipizide and Amaryl so long he does not feel they work for her any longer. Pt and  unsure if pt has been taking Januvia or Janumet. Pt states has been coughing for past 2 weeks after exposed to illness from grandson.  has also been sick. Worse around smokers and at bedtime. Slight congestion and sinus drainage as well. Has been taking Benadryl and cough drops but not helping. Pt stopped taking Nortriptyline for her headaches since can also be used to treat epilepsy and is unsure if she was supposed to be taking that medication and the Depakote. Has not contacted Neurologist to discuss. Reviewed PmHx, RxHx, FmHx, SocHx, AllgHx and updated and dated in the chart.     Review of Systems - negative except as listed above in the HPI    Objective:     Vitals:    02/06/19 1115   BP: 137/83   Pulse: 87   Resp: 17   Temp: 97.9 °F (36.6 °C)   TempSrc: Oral   SpO2: 93%   Weight: 179 lb 9.6 oz (81.5 kg)   Height: 5' 1\" (1.549 m)     Physical Examination: General appearance - alert, well appearing, and in no distress  Ears - bilateral TM's and external ear canals normal  Nose - normal nontender sinuses, mucosal congestion and mucosal erythema  Mouth - mucous membranes moist, pharynx normal without lesions and erythematous  Neck - supple, no significant adenopathy  Chest - clear to auscultation, no wheezes, rales or rhonchi, symmetric air entry  Heart - normal rate, regular rhythm, normal S1, S2, no murmurs, rubs, clicks or gallops    Assessment/ Plan:   Diagnoses and all orders for this visit:    1. Uncontrolled type 2 diabetes mellitus with hyperglycemia (HCC)  -     Blood-Glucose Meter monitoring kit; Testing BID, dx: E11.65  -     glucose blood VI test strips (ASCENSIA AUTODISC VI, ONE TOUCH ULTRA TEST VI) strip; Testing BID, dx: E11.65  -     lancets misc; Testing BID, dx: E11.65  -     insulin glargine-lixisenatide (SOLIQUA 100/33) 100 unit-33 mcg/mL inpn; 10 Units by SubCUTAneous route daily. -     metFORMIN ER (GLUCOPHAGE XR) 500 mg tablet; Titrate gradually up until taking 4 tablets daily  -     METABOLIC PANEL, COMPREHENSIVE  -     LIPID PANEL  -     HEMOGLOBIN A1C WITH EAG  New rxs. Discussed importance of diabetic diet, need for regular exercise. Reviewed disease process and complications that arise from poor control of blood sugars - kidney damage, blindness, amputation, paresthesias. Goal of HgbA1c below 7 and LDL below 100. Need for yearly eye exam and regular foot care. Cont current medications, F/U 3 mo. 2. Pure hypercholesterolemia  -     rosuvastatin (CRESTOR) 40 mg tablet; Take 1 Tab by mouth nightly. -     METABOLIC PANEL, COMPREHENSIVE  -     LIPID PANEL  Discussed need for low fat diet, rich in fruits and vegetables. Encouraged regular meals and daily exercise of at least 20 minutes. Reviewed sequela of high cholesterol on heart and brain health. Continue current medications. F/U 3 mo. 3. Acute URI  -     azithromycin (ZITHROMAX) 250 mg tablet;  Take 2 tabs po today then 1 tab po x 4 days  -     fluconazole (DIFLUCAN) 150 mg tablet; Take 1 Tab by mouth daily for 1 day. May repeat in 3 days if needed  New rxs. Discussed and encouraged rest and clear fluids, if congestion is thick should avoid dairy. Recommended hot showers with steam and elevating head of bed. May use Vitamin C or Echinacea in addition to current therapy. 4. Cough  -     guaiFENesin-codeine (ROBITUSSIN AC) 100-10 mg/5 mL solution; Take 5 mL by mouth three (3) times daily as needed for Cough. Max Daily Amount: 15 mL. -     naloxone (NARCAN) 4 mg/actuation nasal spray; Use 1 spray intranasally, then discard. Repeat with new spray every 2 min as needed for opioid overdose symptoms, alternating nostrils. New rx.     5. Intractable chronic migraine without aura and without status migrainosus  -     cyclobenzaprine (FLEXERIL) 10 mg tablet; Take 1 Tab by mouth two (2) times daily as needed for Muscle Spasm(s). Stable, refill provided. Encouraged pt to restart Nortriptyline and contact Neuro for further guidance. 6. Muscle spasm  -     cyclobenzaprine (FLEXERIL) 10 mg tablet; Take 1 Tab by mouth two (2) times daily as needed for Muscle Spasm(s). 7. Cervicogenic migraine  -     cyclobenzaprine (FLEXERIL) 10 mg tablet; Take 1 Tab by mouth two (2) times daily as needed for Muscle Spasm(s). 8. Gastroesophageal reflux disease with esophagitis  -     omeprazole (PRILOSEC) 20 mg capsule; Take 1 Cap by mouth daily. Stable, refill provided. Pt voiced understanding regarding plan of care. Follow-up Disposition:  Return if symptoms worsen or fail to improve. I have discussed the diagnosis with the patient and the intended plan as seen in the above orders. The patient has received an after-visit summary and questions were answered concerning future plans.      Medication Side Effects and Warnings were discussed with patient    Katie Byrnes NP

## 2019-02-07 ENCOUNTER — TELEPHONE (OUTPATIENT)
Dept: FAMILY MEDICINE CLINIC | Age: 64
End: 2019-02-07

## 2019-02-07 DIAGNOSIS — E11.65 UNCONTROLLED TYPE 2 DIABETES MELLITUS WITH HYPERGLYCEMIA (HCC): Primary | ICD-10-CM

## 2019-02-07 LAB
ALBUMIN SERPL-MCNC: 4.1 G/DL (ref 3.6–4.8)
ALBUMIN/GLOB SERPL: 1.6 {RATIO} (ref 1.2–2.2)
ALP SERPL-CCNC: 81 IU/L (ref 39–117)
ALT SERPL-CCNC: 47 IU/L (ref 0–32)
AST SERPL-CCNC: 43 IU/L (ref 0–40)
BILIRUB SERPL-MCNC: 0.4 MG/DL (ref 0–1.2)
BUN SERPL-MCNC: 9 MG/DL (ref 8–27)
BUN/CREAT SERPL: 12 (ref 12–28)
CALCIUM SERPL-MCNC: 9.1 MG/DL (ref 8.7–10.3)
CHLORIDE SERPL-SCNC: 96 MMOL/L (ref 96–106)
CHOLEST SERPL-MCNC: 151 MG/DL (ref 100–199)
CO2 SERPL-SCNC: 25 MMOL/L (ref 20–29)
CREAT SERPL-MCNC: 0.75 MG/DL (ref 0.57–1)
EST. AVERAGE GLUCOSE BLD GHB EST-MCNC: 326 MG/DL
GLOBULIN SER CALC-MCNC: 2.6 G/DL (ref 1.5–4.5)
GLUCOSE SERPL-MCNC: 384 MG/DL (ref 65–99)
HBA1C MFR BLD: 13 % (ref 4.8–5.6)
HDLC SERPL-MCNC: 40 MG/DL
INTERPRETATION, 910389: NORMAL
LDLC SERPL CALC-MCNC: 78 MG/DL (ref 0–99)
Lab: NORMAL
POTASSIUM SERPL-SCNC: 3.6 MMOL/L (ref 3.5–5.2)
PROT SERPL-MCNC: 6.7 G/DL (ref 6–8.5)
SODIUM SERPL-SCNC: 136 MMOL/L (ref 134–144)
TRIGL SERPL-MCNC: 164 MG/DL (ref 0–149)
VLDLC SERPL CALC-MCNC: 33 MG/DL (ref 5–40)

## 2019-02-07 RX ORDER — INSULIN GLARGINE 100 [IU]/ML
20 INJECTION, SOLUTION SUBCUTANEOUS DAILY
Qty: 1 PEN | Refills: 2 | Status: SHIPPED | OUTPATIENT
Start: 2019-02-07 | End: 2019-03-11 | Stop reason: SDUPTHER

## 2019-02-07 NOTE — PROGRESS NOTES
Please inform pt labs show:   1. Elevated liver enzymes, we will continue to monitor  2. Elevated triglycerides, likely secondary to her high sugars  3. Uncontrolled diabetes - start on new plan for management as discussed at appointment. Recommend increasing Soliqua to 15 units and then they may also increase by 4 units every week until sugars are looking more controlled. F/U 3 mo.    Thanks,  N

## 2019-02-11 NOTE — PROGRESS NOTES
Called and spoke to patient. Subha Alvarez) Patient states understanding of lab results and need for diabetes management plan. Further questions from patient include: Why was Lantus and Trulicity sent to pharm. and not [facility] as discussed. Is order for pen needles  Needed? How often to check BS? Please call patient back with instructions or schedule teaching session.

## 2019-02-13 RX ORDER — PEN NEEDLE, DIABETIC 30 GX3/16"
NEEDLE, DISPOSABLE MISCELLANEOUS
Qty: 100 PACKAGE | Refills: 11 | Status: SHIPPED | OUTPATIENT
Start: 2019-02-13 | End: 2019-02-22 | Stop reason: SDUPTHER

## 2019-02-13 NOTE — PROGRESS NOTES
Pharmacy sent back fax stating [facility] not covered by insurance which is why new rxs sent to pharmacy. Some pharmacies dispense pen needles with insulin, some require order for pen needles. If pt needs rx sent for pen needles please let me know. Recommend pt test BS twice daily as also written on her test scripts rxs.   Once fasting and one 2 hrs after a meal.   Thanks,  N

## 2019-02-13 NOTE — PROGRESS NOTES
Patient id x [phone], notified as per Jerry Grace and verbalized understanding.     Please send in rx for pen needles

## 2019-03-04 ENCOUNTER — TELEPHONE (OUTPATIENT)
Dept: FAMILY MEDICINE CLINIC | Age: 64
End: 2019-03-04

## 2019-03-04 DIAGNOSIS — E11.65 UNCONTROLLED TYPE 2 DIABETES MELLITUS WITH HYPERGLYCEMIA (HCC): ICD-10-CM

## 2019-03-04 NOTE — TELEPHONE ENCOUNTER
Patient knows she has refills of trulicity at [facility] but would like it sent to [facility] because it is free there for her. Can you sent trulicity rx to [facility]?  Let her know if this is a problem

## 2019-03-11 ENCOUNTER — TELEPHONE (OUTPATIENT)
Dept: FAMILY MEDICINE CLINIC | Age: 64
End: 2019-03-11

## 2019-03-11 DIAGNOSIS — E11.65 UNCONTROLLED TYPE 2 DIABETES MELLITUS WITH HYPERGLYCEMIA (HCC): ICD-10-CM

## 2019-03-11 RX ORDER — INSULIN GLARGINE 100 [IU]/ML
20 INJECTION, SOLUTION SUBCUTANEOUS DAILY
Qty: 6 PEN | Refills: 0 | Status: SHIPPED | OUTPATIENT
Start: 2019-03-11 | End: 2019-05-29 | Stop reason: SDUPTHER

## 2019-03-11 NOTE — TELEPHONE ENCOUNTER
----- Message from Pancho Robison sent at 3/11/2019 10:27 AM EDT -----  Regarding: Np.Bell/Refill   Pt requesting a 90 day refill for Rx\"Lantus solostar pen 100 units\" to be called into [address] at [phone]. Also,pt stated she will like to received a call to confirm the refill. Best contact:[phone]

## 2019-04-26 DIAGNOSIS — M62.838 MUSCLE SPASM: ICD-10-CM

## 2019-04-26 DIAGNOSIS — G43.719 INTRACTABLE CHRONIC MIGRAINE WITHOUT AURA AND WITHOUT STATUS MIGRAINOSUS: ICD-10-CM

## 2019-04-26 DIAGNOSIS — G43.809 CERVICOGENIC MIGRAINE: ICD-10-CM

## 2019-04-29 RX ORDER — DIVALPROEX SODIUM 250 MG/1
TABLET, DELAYED RELEASE ORAL
Qty: 180 TAB | Refills: 0 | Status: SHIPPED | OUTPATIENT
Start: 2019-04-29 | End: 2019-05-20 | Stop reason: SDUPTHER

## 2019-05-20 DIAGNOSIS — M62.838 MUSCLE SPASM: ICD-10-CM

## 2019-05-20 DIAGNOSIS — G43.809 CERVICOGENIC MIGRAINE: ICD-10-CM

## 2019-05-20 DIAGNOSIS — G43.719 INTRACTABLE CHRONIC MIGRAINE WITHOUT AURA AND WITHOUT STATUS MIGRAINOSUS: ICD-10-CM

## 2019-05-20 RX ORDER — DIVALPROEX SODIUM 250 MG/1
TABLET, DELAYED RELEASE ORAL
Qty: 180 TAB | Refills: 0 | Status: SHIPPED | OUTPATIENT
Start: 2019-05-20 | End: 2019-06-19 | Stop reason: SDUPTHER

## 2019-05-29 ENCOUNTER — HOSPITAL ENCOUNTER (OUTPATIENT)
Dept: LAB | Age: 64
Discharge: HOME | End: 2019-05-29
Payer: MEDICARE

## 2019-05-29 ENCOUNTER — OFFICE VISIT (OUTPATIENT)
Dept: FAMILY MEDICINE CLINIC | Age: 64
End: 2019-05-29

## 2019-05-29 VITALS
RESPIRATION RATE: 20 BRPM | HEART RATE: 100 BPM | HEIGHT: 61 IN | OXYGEN SATURATION: 95 % | BODY MASS INDEX: 31.53 KG/M2 | SYSTOLIC BLOOD PRESSURE: 128 MMHG | WEIGHT: 167 LBS | TEMPERATURE: 98.7 F | DIASTOLIC BLOOD PRESSURE: 84 MMHG

## 2019-05-29 DIAGNOSIS — G40.219 PARTIAL EPILEPSY WITH IMPAIRMENT OF CONSCIOUSNESS, INTRACTABLE (HCC): ICD-10-CM

## 2019-05-29 DIAGNOSIS — E78.00 PURE HYPERCHOLESTEROLEMIA: ICD-10-CM

## 2019-05-29 DIAGNOSIS — E11.65 UNCONTROLLED TYPE 2 DIABETES MELLITUS WITH HYPERGLYCEMIA (HCC): Primary | ICD-10-CM

## 2019-05-29 PROCEDURE — 80053 COMPREHEN METABOLIC PANEL: CPT

## 2019-05-29 PROCEDURE — 83036 HEMOGLOBIN GLYCOSYLATED A1C: CPT

## 2019-05-29 PROCEDURE — 80061 LIPID PANEL: CPT

## 2019-05-29 RX ORDER — INSULIN GLARGINE 100 [IU]/ML
50 INJECTION, SOLUTION SUBCUTANEOUS DAILY
Qty: 15 PEN | Refills: 1 | Status: SHIPPED | OUTPATIENT
Start: 2019-05-29 | End: 2019-08-15 | Stop reason: SDUPTHER

## 2019-05-29 RX ORDER — GABAPENTIN 300 MG/1
300 CAPSULE ORAL
Qty: 30 CAP | Refills: 3 | Status: SHIPPED | OUTPATIENT
Start: 2019-05-29 | End: 2019-08-15 | Stop reason: SDUPTHER

## 2019-05-29 RX ORDER — ROSUVASTATIN CALCIUM 40 MG/1
40 TABLET, COATED ORAL
Qty: 90 TAB | Refills: 1 | Status: SHIPPED | OUTPATIENT
Start: 2019-05-29 | End: 2019-06-24 | Stop reason: SDUPTHER

## 2019-05-29 RX ORDER — INSULIN GLARGINE 100 [IU]/ML
20 INJECTION, SOLUTION SUBCUTANEOUS DAILY
Qty: 6 PEN | Refills: 1 | Status: SHIPPED | OUTPATIENT
Start: 2019-05-29 | End: 2019-05-29 | Stop reason: SDUPTHER

## 2019-05-29 NOTE — PROGRESS NOTES
Patient here for dm fasting labs, med refills. Patient states she has a sore throat when mouth is dry. 1. Have you been to the ER, urgent care clinic since your last visit? Hospitalized since your last visit? No    2. Have you seen or consulted any other health care providers outside of the [address] [place] [address] since your last visit? Include any pap smears or colon screening. No     Lab Results   Component Value Date/Time    Microalb/Creat ratio (ug/mg creat.) 4.6 10/24/2018 10:36 AM      Chief Complaint   Patient presents with    Diabetes     fasting labs    Medication Refill    Sore Throat     sore throat when mouth is dry. she is a 71y.o. year old female who presents for evaluation. See Diabetic Report Card listed above. Patient Active Problem List    Diagnosis    Legal blindness, as defined in [place]    Severe obesity (BMI 35.0-39. 9)    Type II or unspecified type diabetes mellitus without mention of complication, uncontrolled    Hyperlipidemia    Diabetic peripheral neuropathy associated with type 2 diabetes mellitus (HCC)    Post concussive syndrome    Partial epilepsy with impairment of consciousness, intractable (HCC)    Intractable migraine without aura    Headache(784.0)       Reviewed PmHx, RxHx, FmHx, SocHx, AllgHx--dated and updated in the chart.     Review of Systems - negative except as listed above in the HPI    Objective:     Vitals:    05/29/19 0953   BP: 128/84   Pulse: 100   Resp: 20   Temp: 98.7 °F (37.1 °C)   SpO2: 95%   Weight: 167 lb (75.8 kg)   Height: 5' 1\" (1.549 m)     Physical Examination: General appearance - alert, well appearing, and in no distress  Neck - supple, no significant adenopathy  Chest - clear to auscultation, no wheezes, rales or rhonchi, symmetric air entry  Heart - normal rate, regular rhythm, normal S1, S2, no murmurs, rubs, clicks or gallops  Abdomen - soft, nontender, nondistended, no masses or organomegaly  Extremities - peripheral pulses normal, no pedal edema, no clubbing or cyanosis      Assessment/ Plan:   Diagnoses and all orders for this visit:    1. Uncontrolled type 2 diabetes mellitus with hyperglycemia (HCC)  -     LIPID PANEL  -     METABOLIC PANEL, COMPREHENSIVE  -     HEMOGLOBIN A1C WITH EAG  -     dulaglutide (TRULICITY) 1.5 WV/2.9 mL sub-q pen; 0.5 mL by SubCUTAneous route every seven (7) days. -     insulin glargine (LANTUS,BASAGLAR) 100 unit/mL (3 mL) inpn; 50 Units by SubCUTAneous route daily for 90 days.  -add to [facility]  -inc lantus from 20 to 40 units  -inc A1C=13    2. Pure hypercholesterolemia  -     rosuvastatin (CRESTOR) 40 mg tablet; Take 1 Tab by mouth nightly. -     LIPID PANEL  -     METABOLIC PANEL, COMPREHENSIVE    3. Partial epilepsy with impairment of consciousness, intractable (HCC)  -     gabapentin (NEURONTIN) 300 mg capsule; Take 1 Cap by mouth nightly. Follow-up and Dispositions    · Return in about 3 months (around 8/29/2019) for dm. Lab Results   Component Value Date/Time    Cholesterol, total 151 02/06/2019 11:57 AM    HDL Cholesterol 40 02/06/2019 11:57 AM    LDL, calculated 78 02/06/2019 11:57 AM    Triglyceride 164 (H) 02/06/2019 11:57 AM     Lab Results   Component Value Date/Time    Hemoglobin A1c 13.0 (H) 02/06/2019 11:57 AM    Hemoglobin A1c 10.2 (H) 10/24/2018 10:36 AM    Microalb/Creat ratio (ug/mg creat.) 4.6 10/24/2018 10:36 AM    LDL, calculated 78 02/06/2019 11:57 AM    Creatinine 0.75 02/06/2019 11:57 AM          Discussed with patient goal of Diabetes to include:  HgA1C <7, LDL cholesterol <100, Blood pressure <140/80. Discussed with patient diet and weight management and to get regular exercise. Recommend yearly eye exams and daily foot care. The patient understands and agrees with the plan. I have discussed the diagnosis with the patient and the intended plan as seen in the above orders.   The patient has received an after-visit summary and questions were answered concerning future plans. Medication Side Effects and Warnings were discussed with patient  Patient Labs were reviewed and or requested  Patient Past Records were reviewed and or requested    Brittney Navarro M.D. [address] [facility]    There are no Patient Instructions on file for this visit.

## 2019-05-30 LAB
ALBUMIN SERPL-MCNC: 4.2 G/DL (ref 3.6–4.8)
ALBUMIN/GLOB SERPL: 1.6 {RATIO} (ref 1.2–2.2)
ALP SERPL-CCNC: 110 IU/L (ref 39–117)
ALT SERPL-CCNC: 35 IU/L (ref 0–32)
AST SERPL-CCNC: 46 IU/L (ref 0–40)
BILIRUB SERPL-MCNC: 0.4 MG/DL (ref 0–1.2)
BUN SERPL-MCNC: 17 MG/DL (ref 8–27)
BUN/CREAT SERPL: 22 (ref 12–28)
CALCIUM SERPL-MCNC: 9.5 MG/DL (ref 8.7–10.3)
CHLORIDE SERPL-SCNC: 95 MMOL/L (ref 96–106)
CHOLEST SERPL-MCNC: 201 MG/DL (ref 100–199)
CO2 SERPL-SCNC: 22 MMOL/L (ref 20–29)
CREAT SERPL-MCNC: 0.78 MG/DL (ref 0.57–1)
EST. AVERAGE GLUCOSE BLD GHB EST-MCNC: 324 MG/DL
GLOBULIN SER CALC-MCNC: 2.7 G/DL (ref 1.5–4.5)
GLUCOSE SERPL-MCNC: 307 MG/DL (ref 65–99)
HBA1C MFR BLD: 12.9 % (ref 4.8–5.6)
HDLC SERPL-MCNC: 42 MG/DL
INTERPRETATION, 910389: NORMAL
LDLC SERPL CALC-MCNC: 114 MG/DL (ref 0–99)
Lab: NORMAL
POTASSIUM SERPL-SCNC: 4.1 MMOL/L (ref 3.5–5.2)
PROT SERPL-MCNC: 6.9 G/DL (ref 6–8.5)
SODIUM SERPL-SCNC: 136 MMOL/L (ref 134–144)
TRIGL SERPL-MCNC: 224 MG/DL (ref 0–149)
VLDLC SERPL CALC-MCNC: 45 MG/DL (ref 5–40)

## 2019-05-30 NOTE — PROGRESS NOTES
Spoke to pt to review lab results. Hemoglobin A1c ooc. Instructed pt to take 40 units Lantus in AM and test FBS and bs before bed. Increase insulin by 5 units every 5 days until FBS between . Continue metformin and Trulicity. Discussed with patient goal of Diabetes to include: HgA1C <7, LDL cholesterol <100, Blood pressure <140/80. Discussed with patient diet and weight management and to get regular exercise. Recommend yearly eye exams and daily foot care. The patient understands and agrees with the plan. Labs due 8/15/2019. Pt enrolled in [facility].

## 2019-06-07 ENCOUNTER — TELEPHONE (OUTPATIENT)
Dept: FAMILY MEDICINE CLINIC | Age: 64
End: 2019-06-07

## 2019-06-13 ENCOUNTER — OFFICE VISIT (OUTPATIENT)
Dept: NEUROLOGY | Age: 64
End: 2019-06-13

## 2019-06-13 VITALS
RESPIRATION RATE: 18 BRPM | DIASTOLIC BLOOD PRESSURE: 81 MMHG | OXYGEN SATURATION: 98 % | HEART RATE: 87 BPM | HEIGHT: 61 IN | BODY MASS INDEX: 31.55 KG/M2 | SYSTOLIC BLOOD PRESSURE: 142 MMHG

## 2019-06-13 DIAGNOSIS — G43.919 INTRACTABLE MIGRAINE WITHOUT STATUS MIGRAINOSUS, UNSPECIFIED MIGRAINE TYPE: ICD-10-CM

## 2019-06-13 DIAGNOSIS — R56.9 SEIZURES (HCC): Primary | ICD-10-CM

## 2019-06-13 NOTE — PROGRESS NOTES
Margot Goldman is a 71 y.o. female who presents with the following  Chief Complaint   Patient presents with    Headache     1-2 headaches per week       HPI     She comes in for a follow up for Migraines. She reports that she is about 3 migraines a week. She is current taking fexeril and it works she does not take it every night because it make her sleep. Not taking pamelor due to sedation. She notes that the flexeril is helping with her cerverigonic headaches    As for her seizure, No recent seizure. She is Currently taking Depakote 250 mg tabs 3 in the morning and 3 in the evening. She endorses compliance   Headache Characterization: Shooting sharp  Pain Level:10 /10  Aura: yes   Frequency/Length: 3 a week . She reports that it will last about 6 hours  Location: she reports they are located  The back of her head  Nausea/Vomiting:   Yes/ no   Photophobia:  Yes   Phonophobia: yes   Provoking factors: light  Relieving factors: flexeril   Focal neurologic symptoms with headache:none     Taking her  tylenol and caffeine and this is working. Wants her own bc this has helped. Allergies   Allergen Reactions    Flexall [Menthol-Aloe Vera Extract] Swelling    Sudafed [Pseudoephedrine Hcl] Other (comments)     Jittery        Current Outpatient Medications   Medication Sig    acetaminophen-caffeine 500-65 mg (EXCEDRINE TENSION HEADACHE) 500-65 mg tab Take 1 Tab by mouth every eight (8) hours as needed for Headache.  gabapentin (NEURONTIN) 300 mg capsule Take 1 Cap by mouth nightly.  rosuvastatin (CRESTOR) 40 mg tablet Take 1 Tab by mouth nightly.  dulaglutide (TRULICITY) 1.5 PS/1.7 mL sub-q pen 0.5 mL by SubCUTAneous route every seven (7) days.  insulin glargine (LANTUS,BASAGLAR) 100 unit/mL (3 mL) inpn 50 Units by SubCUTAneous route daily for 90 days.     divalproex DR (DEPAKOTE) 250 mg tablet TAKE 3 TABLETS BY MOUTH EVERY MORNING AND 3 TABLETS BY MOUTH EVERY EVENING    Insulin Needles, Disposable, 31 gauge x 5/16\" ndle Using with Lantus and Trulicity, 8 per week, Dx: E11.65    omeprazole (PRILOSEC) 20 mg capsule Take 1 Cap by mouth daily.  Blood-Glucose Meter monitoring kit Testing BID, dx: E11.65    glucose blood VI test strips (ASCENSIA AUTODISC VI, ONE TOUCH ULTRA TEST VI) strip Testing BID, dx: E11.65    lancets misc Testing BID, dx: E11.65    metFORMIN ER (GLUCOPHAGE XR) 500 mg tablet Titrate gradually up until taking 4 tablets daily    cyclobenzaprine (FLEXERIL) 10 mg tablet Take 1 Tab by mouth two (2) times daily as needed for Muscle Spasm(s).  celecoxib (CELEBREX) 200 mg capsule Take  by mouth two (2) times a day.  PARoxetine (PAXIL) 20 mg tablet Take  by mouth daily.  ALPRAZOLAM (XANAX PO) Take  by mouth.  diphenhydrAMINE (BENADRYL ALLERGY) 25 mg tablet Take 25 mg by mouth every six (6) hours as needed.  cholecalciferol, vitamin d3, (VITAMIN D) 1,000 unit tablet Take 5,000 Units by mouth daily.  naloxone (NARCAN) 4 mg/actuation nasal spray Use 1 spray intranasally, then discard. Repeat with new spray every 2 min as needed for opioid overdose symptoms, alternating nostrils. No current facility-administered medications for this visit.         Social History     Tobacco Use   Smoking Status Never Smoker   Smokeless Tobacco Never Used       Past Medical History:   Diagnosis Date    Diabetic peripheral neuropathy associated with type 2 diabetes mellitus (HCC)     Dyslipidemia     Hx of migraines     Hyperlipidemia     Type II or unspecified type diabetes mellitus without mention of complication, uncontrolled     Unspecified epilepsy without mention of intractable epilepsy     Vision decreased        Past Surgical History:   Procedure Laterality Date    COLONOSCOPY N/A 1/30/2019    [facility] HX CATARACT REMOVAL Bilateral     HX COLONOSCOPY      HX ENDOSCOPY  04/2016    EGD, stretched her esopaghus, recurrent issue     HX GYN Bilateral     tubaligation    HX ORTHOPAEDIC      both knees, L shoulder     HX REFRACTIVE SURGERY  02/05/2012    right eye    HX RETINAL DETACHMENT REPAIR Bilateral        Family History   Problem Relation Age of Onset    Cancer Mother     Cancer Other     Heart Disease Father        Social History     Socioeconomic History    Marital status:      Spouse name: Not on file    Number of children: Not on file    Years of education: Not on file    Highest education level: Not on file   Tobacco Use    Smoking status: Never Smoker    Smokeless tobacco: Never Used   Substance and Sexual Activity    Alcohol use: No    Sexual activity: Never     Partners: Male       Review of Systems   Eyes: Positive for blurred vision and photophobia. Respiratory: Negative for shortness of breath and wheezing. Cardiovascular: Negative for chest pain and palpitations. Gastrointestinal: Positive for nausea. Negative for vomiting. Neurological: Positive for headaches. Negative for dizziness, tingling, tremors, seizures and loss of consciousness. Remainder of comprehensive review is negative. Physical Exam :    Visit Vitals  /81   Pulse 87   Resp 18   Ht 5' 1\" (1.549 m)   SpO2 98%   BMI 31.55 kg/m²       General: Well defined, nourished, and groomed individual in no acute distress.    Neck: Supple, nontender, no bruits, no pain with resistance to active range of motion.    Heart: Regular rate and rhythm, no murmurs, rub, or gallop. Normal S1S2. Lungs: Clear to auscultation bilaterally with equal chest expansion, no cough, no wheeze  Musculoskeletal: Extremities revealed no edema and had full range of motion of joints.    Psych: Good mood and bright affect    NEUROLOGICAL EXAMINATION:    Mental Status: Alert and oriented to person, place, and time    Cranial Nerves:    II, III, IV, VI: Visual acuity grossly intact.  Visual fields are normal.    Pupils are equal, round, and reactive to light and accommodation.    Extra-ocular movements are full and fluid. Fundoscopic exam was benign, no ptosis or nystagmus.    V-XII: Hearing is grossly intact. Facial features are symmetric, with normal sensation and strength. The palate rises symmetrically and the tongue protrudes midline. Sternocleidomastoids 5/5. Motor Examination: Normal tone, bulk, and strength, 5/5 muscle strength throughout. Coordination: Finger to nose was normal. No resting or intention tremor    Gait and Station: Steady while walking. Normal arm swing. No pronator drift. No muscle wasting or fasiculations noted. Reflexes: DTRs 2+ throughout. Results for orders placed or performed in visit on 05/29/19   LIPID PANEL   Result Value Ref Range    Cholesterol, total 201 (H) 100 - 199 mg/dL    Triglyceride 224 (H) 0 - 149 mg/dL    HDL Cholesterol 42 >39 mg/dL    VLDL, calculated 45 (H) 5 - 40 mg/dL    LDL, calculated 114 (H) 0 - 99 mg/dL   METABOLIC PANEL, COMPREHENSIVE   Result Value Ref Range    Glucose 307 (H) 65 - 99 mg/dL    BUN 17 8 - 27 mg/dL    Creatinine 0.78 0.57 - 1.00 mg/dL    GFR est non-AA 80 >59 mL/min/1.73    GFR est AA 92 >59 mL/min/1.73    BUN/Creatinine ratio 22 12 - 28    Sodium 136 134 - 144 mmol/L    Potassium 4.1 3.5 - 5.2 mmol/L    Chloride 95 (L) 96 - 106 mmol/L    CO2 22 20 - 29 mmol/L    Calcium 9.5 8.7 - 10.3 mg/dL    Protein, total 6.9 6.0 - 8.5 g/dL    Albumin 4.2 3.6 - 4.8 g/dL    GLOBULIN, TOTAL 2.7 1.5 - 4.5 g/dL    A-G Ratio 1.6 1.2 - 2.2    Bilirubin, total 0.4 0.0 - 1.2 mg/dL    Alk.  phosphatase 110 39 - 117 IU/L    AST (SGOT) 46 (H) 0 - 40 IU/L    ALT (SGPT) 35 (H) 0 - 32 IU/L   HEMOGLOBIN A1C WITH EAG   Result Value Ref Range    Hemoglobin A1c 12.9 (H) 4.8 - 5.6 %    Estimated average glucose 324 mg/dL   CVD REPORT   Result Value Ref Range    INTERPRETATION Note    DIABETES PATIENT EDUCATION   Result Value Ref Range    PDF Image Not applicable Orders Placed This Encounter    VALPROIC ACID    acetaminophen-caffeine 500-65 mg (EXCEDRINE TENSION HEADACHE) 500-65 mg tab     Sig: Take 1 Tab by mouth every eight (8) hours as needed for Headache. Dispense:  60 Tab     Refill:  5       1. Seizures ([address]    2. Intractable migraine without status migrainosus, unspecified migraine type        Follow-up and Dispositions    · Return in about 3 months (around 9/13/2019). Seizures stable. Keep Depakote for prevention. Doing well. She states no seizures in years. Sleep deprivation, missing medications are the triggers for her. Get a depakote level. Discussed symptoms of migraines. Does not want to pursue any other preventions right now. Will get her a fill of what he  has been using. Keep track and call with any changes.          This note will not be viewable in [employer]

## 2019-06-13 NOTE — PATIENT INSTRUCTIONS
[facility]   Statement to Patients  April 1, 2014      In an effort to ensure the large volume of patient prescription refills is processed in the most efficient and expeditious manner, we are asking our patients to assist us by calling your Pharmacy for all prescription refills, this will include also your  Mail Order Pharmacy. The pharmacy will contact our office electronically to continue the refill process. Please do not wait until the last minute to call your pharmacy. We need at least 48 hours (2days) to fill prescriptions. We also encourage you to call your pharmacy before going to  your prescription to make sure it is ready. With regard to controlled substance prescription refill requests (narcotic refills) that need to be picked up at our office, we ask your cooperation by providing us with at least 72 hours (3days) notice that you will need a refill. We will not refill narcotic prescription refill requests after 4:00pm on any weekday, Monday through Thursday, or after 2:00pm on Fridays, or on the weekends. We encourage everyone to explore another way of getting your prescription refill request processed using Nuiku, our patient web portal through our electronic medical record system. Nuiku is an efficient and effective way to communicate your medication request directly to the office and  downloadable as an otf on your smart phone . Nuiku also features a review functionality that allows you to view your medication list as well as leave messages for your physician. Are you ready to get connected? If so please review the attatched instructions or speak to any of our staff to get you set up right away! Thank you so much for your cooperation. Should you have any questions please contact our Practice Administrator.     The Physicians and Staff,  [facility] [address] [address]  What is a living will?    A living will is a legal form you use to write down the kind of care you want at the end of your life. It is used by the health professionals who will treat you if you aren't able to decide for yourself. If you put your wishes in writing, your loved ones and others will know what kind of care you want. They won't need to guess. This can ease your mind and be helpful to others. A living will is not the same as an estate or property will. An estate will explains what you want to happen with your money and property after you die. Is a living will a legal document? A living will is a legal document. Each state has its own laws about living grewal. If you move to another state, make sure that your living will is legal in the state where you now live. Or you might use a universal form that has been approved by many states. This kind of form can sometimes be completed and stored online. Your electronic copy will then be available wherever you have a connection to the Internet. In most cases, doctors will respect your wishes even if you have a form from a different state. · You don't need an  to complete a living will. But legal advice can be helpful if your state's laws are unclear, your health history is complicated, or your family can't agree on what should be in your living will. · You can change your living will at any time. Some people find that their wishes about end-of-life care change as their health changes. · In addition to making a living will, think about completing a medical power of  form. This form lets you name the person you want to make end-of-life treatment decisions for you (your \"health care agent\") if you're not able to. Many hospitals and nursing homes will give you the forms you need to complete a living will and a medical power of . · Your living will is used only if you can't make or communicate decisions for yourself anymore.  If you become able to make decisions again, you can accept or refuse any treatment, no matter what you wrote in your living will. · Your state may offer an online registry. This is a place where you can store your living will online so the doctors and nurses who need to treat you can find it right away. What should you think about when creating a living will? Talk about your end-of-life wishes with your family members and your doctor. Let them know what you want. That way the people making decisions for you won't be surprised by your choices. Think about these questions as you make your living will:  · Do you know enough about life support methods that might be used? If not, talk to your doctor so you know what might be done if you can't breathe on your own, your heart stops, or you're unable to swallow. · What things would you still want to be able to do after you receive life-support methods? Would you want to be able to walk? To speak? To eat on your own? To live without the help of machines? · If you have a choice, where do you want to be cared for? In your home? At a hospital or nursing home? · Do you want certain Scientology practices performed if you become very ill? · If you have a choice at the end of your life, where would you prefer to die? At home? In a hospital or nursing home? Somewhere else? · Would you prefer to be buried or cremated? · Do you want your organs to be donated after you die? What should you do with your living will? · Make sure that your family members and your health care agent have copies of your living will. · Give your doctor a copy of your living will to keep in your medical record. If you have more than one doctor, make sure that each one has a copy. · You may want to put a copy of your living will where it can be easily found. Where can you learn more? Go to http://soren-surjit.info/. Enter A041 in the search box to learn more about \"Learning About Living Perverito. \"  Current as of: April 18, 2018  Content Version: 11.9  © 4262-9310 [employer]. Care instructions adapted under license by [employer] (which disclaims liability or warranty for this information). If you have questions about a medical condition or this instruction, always ask your healthcare professional. [address] any warranty or liability for your use of this information.

## 2019-06-18 ENCOUNTER — TELEPHONE (OUTPATIENT)
Dept: NEUROLOGY | Age: 64
End: 2019-06-18

## 2019-06-18 NOTE — TELEPHONE ENCOUNTER
Pt is requesting that we call [facility] and have her medication transferred to the [facility].  They will not transfer for Pt with out us calling  Marv # [phone]

## 2019-06-19 ENCOUNTER — TELEPHONE (OUTPATIENT)
Dept: NEUROLOGY | Age: 64
End: 2019-06-19

## 2019-06-19 DIAGNOSIS — G43.719 INTRACTABLE CHRONIC MIGRAINE WITHOUT AURA AND WITHOUT STATUS MIGRAINOSUS: ICD-10-CM

## 2019-06-19 DIAGNOSIS — G43.809 CERVICOGENIC MIGRAINE: ICD-10-CM

## 2019-06-19 DIAGNOSIS — M62.838 MUSCLE SPASM: ICD-10-CM

## 2019-06-19 RX ORDER — DIVALPROEX SODIUM 250 MG/1
TABLET, DELAYED RELEASE ORAL
Qty: 180 TAB | Refills: 5 | Status: SHIPPED | OUTPATIENT
Start: 2019-06-19 | End: 2020-02-12 | Stop reason: SDUPTHER

## 2019-06-19 RX ORDER — DIVALPROEX SODIUM 250 MG/1
TABLET, DELAYED RELEASE ORAL
Qty: 180 TAB | Refills: 0 | Status: SHIPPED | OUTPATIENT
Start: 2019-06-19 | End: 2019-06-19 | Stop reason: SDUPTHER

## 2019-06-19 NOTE — TELEPHONE ENCOUNTER
----- Message from [facility] sent at 6/18/2019  3:49 PM EDT -----  Regarding: NP Olaughlin/ refill  The pt received a new prescription for migraines (unknown) at her last visit (6/13) and needs it transferred from the [facility] pharmacy to [facility] instead.       Best contact number is [phone]

## 2019-06-20 ENCOUNTER — OP HISTORICAL/CONVERTED ENCOUNTER (OUTPATIENT)
Dept: OTHER | Age: 64
End: 2019-06-20

## 2019-06-24 DIAGNOSIS — E78.00 PURE HYPERCHOLESTEROLEMIA: ICD-10-CM

## 2019-06-24 NOTE — TELEPHONE ENCOUNTER
Pt calling and needs refill of Crestor sent to [address]. Also states that her diabetes meter the top part broke off and needs new one. If so can you send a new order to them as well. She can be reached at [phone].

## 2019-06-25 RX ORDER — ROSUVASTATIN CALCIUM 40 MG/1
40 TABLET, COATED ORAL
Qty: 90 TAB | Refills: 1 | Status: SHIPPED | OUTPATIENT
Start: 2019-06-25 | End: 2020-12-23 | Stop reason: SDUPTHER

## 2019-06-26 ENCOUNTER — TELEPHONE (OUTPATIENT)
Dept: FAMILY MEDICINE CLINIC | Age: 64
End: 2019-06-26

## 2019-06-26 NOTE — TELEPHONE ENCOUNTER
LVM to patient requesting which diabetic machine she uses and confirm how often she tests and which pharmacy.

## 2019-07-02 ENCOUNTER — TELEPHONE (OUTPATIENT)
Dept: NEUROLOGY | Age: 64
End: 2019-07-02

## 2019-07-02 NOTE — TELEPHONE ENCOUNTER
[facility]  med's they were the wrong med,it was for her seizure not for her Migraines  She needs take med and has to go back to [facility]  [phone]

## 2019-07-03 NOTE — TELEPHONE ENCOUNTER
[facility]. They did receive the script but do not carry the medication. R/t call to patient. States edmond told her the same thing, but that it was over the counter.  She will check prices at pharmacies to buy excedrin tension headache OTC

## 2019-07-03 NOTE — TELEPHONE ENCOUNTER
----- Message from Francisco Lui sent at 7/3/2019  2:43 PM EDT -----  Regarding: ERWIN Son/Telephone  Pt requesting a call back in regards to a medication. Best contact [phone].

## 2019-07-05 ENCOUNTER — TELEPHONE (OUTPATIENT)
Dept: NEUROLOGY | Age: 64
End: 2019-07-05

## 2019-07-05 NOTE — TELEPHONE ENCOUNTER
----- Message from Ben Horton sent at 7/3/2019  2:43 PM EDT -----  Regarding: ERWIN Son/Telephone  Pt requesting a call back in regards to a medication. Best contact [phone].

## 2019-07-09 DIAGNOSIS — E11.65 UNCONTROLLED TYPE 2 DIABETES MELLITUS WITH HYPERGLYCEMIA (HCC): Primary | ICD-10-CM

## 2019-07-09 RX ORDER — INSULIN PUMP SYRINGE, 3 ML
EACH MISCELLANEOUS
Qty: 1 KIT | Refills: 0 | Status: SHIPPED | OUTPATIENT
Start: 2019-07-09

## 2019-07-09 NOTE — TELEPHONE ENCOUNTER
Attempted to call in meter, [facility] does not accept fax or call in's only hard copy from pt or [facility].

## 2019-08-15 ENCOUNTER — OFFICE VISIT (OUTPATIENT)
Dept: FAMILY MEDICINE CLINIC | Age: 64
End: 2019-08-15

## 2019-08-15 ENCOUNTER — HOSPITAL ENCOUNTER (OUTPATIENT)
Dept: LAB | Age: 64
Discharge: HOME | End: 2019-08-15
Payer: MEDICARE

## 2019-08-15 VITALS
OXYGEN SATURATION: 96 % | TEMPERATURE: 97.8 F | RESPIRATION RATE: 16 BRPM | SYSTOLIC BLOOD PRESSURE: 120 MMHG | DIASTOLIC BLOOD PRESSURE: 78 MMHG | HEIGHT: 61 IN | HEART RATE: 86 BPM | WEIGHT: 169.4 LBS | BODY MASS INDEX: 31.98 KG/M2

## 2019-08-15 DIAGNOSIS — E78.00 PURE HYPERCHOLESTEROLEMIA: ICD-10-CM

## 2019-08-15 DIAGNOSIS — G40.219 PARTIAL EPILEPSY WITH IMPAIRMENT OF CONSCIOUSNESS, INTRACTABLE (HCC): ICD-10-CM

## 2019-08-15 DIAGNOSIS — E11.65 UNCONTROLLED TYPE 2 DIABETES MELLITUS WITH HYPERGLYCEMIA (HCC): Primary | ICD-10-CM

## 2019-08-15 PROCEDURE — 80061 LIPID PANEL: CPT

## 2019-08-15 PROCEDURE — 83036 HEMOGLOBIN GLYCOSYLATED A1C: CPT

## 2019-08-15 PROCEDURE — 80053 COMPREHEN METABOLIC PANEL: CPT

## 2019-08-15 RX ORDER — GABAPENTIN 300 MG/1
300 CAPSULE ORAL
Qty: 30 CAP | Refills: 3 | Status: SHIPPED | OUTPATIENT
Start: 2019-08-15 | End: 2020-03-10 | Stop reason: SDUPTHER

## 2019-08-15 RX ORDER — INSULIN GLARGINE 100 [IU]/ML
65 INJECTION, SOLUTION SUBCUTANEOUS DAILY
Qty: 15 PEN | Refills: 1 | Status: SHIPPED | OUTPATIENT
Start: 2019-08-15 | End: 2019-11-14

## 2019-08-15 NOTE — PROGRESS NOTES
Chief Complaint   Patient presents with    Diabetes     BS: 147 this am    Labs     Fasting today    Headache     X 5    Pre-op Exam     Knee surgery  : Ortho: Dr Gerda Mcardle     1. Have you been to the ER, urgent care clinic since your last visit? Hospitalized since your last visit? No    2. Have you seen or consulted any other health care providers outside of the [address] [place] [address] since your last visit? Include any pap smears or colon screening. Estefania Gonzalez  8/15/2019  Provider:   Irish:  Diabetes Report Card   1) Have you seen the eye doctor in past year?yes    2) How would you  rate your Diabetic Diet? Cheater   3) How well do you take care of your feet? Pedicure   4) Do you keep your Primary Care Follow Up Appts? yes    5) Do you know your A1C goal?no    6) Do you take your medications daily? yes    7) Do you check your blood sugars? yes    8) Have you gained weight?yes       9) Do you follow an exercise program?no    10) Can you do better?no      Lab Results   Component Value Date/Time    Cholesterol, total 201 (H) 05/29/2019 10:37 AM    HDL Cholesterol 42 05/29/2019 10:37 AM    LDL, calculated 114 (H) 05/29/2019 10:37 AM    Triglyceride 224 (H) 05/29/2019 10:37 AM     Lab Results   Component Value Date/Time    Hemoglobin A1c 12.9 (H) 05/29/2019 10:37 AM    Hemoglobin A1c 13.0 (H) 02/06/2019 11:57 AM    Hemoglobin A1c 10.2 (H) 10/24/2018 10:36 AM    Glucose 307 (H) 05/29/2019 10:37 AM    Glucose (POC) 282 (H) 01/30/2019 02:57 PM    Microalb/Creat ratio (ug/mg creat.) 4.6 10/24/2018 10:36 AM    LDL, calculated 114 (H) 05/29/2019 10:37 AM    Creatinine 0.78 05/29/2019 10:37 AM          Lab Results   Component Value Date/Time    Microalb/Creat ratio (ug/mg creat.) 4.6 10/24/2018 10:36 AM      Chief Complaint   Patient presents with    Diabetes     BS: 147 this am    Labs     Fasting today    Headache     X 5    Pre-op Exam     Knee surgery  : Ortho: Dr Gerda Mcardle     she is a 71y.o. year old female who presents for evaluation. See Diabetic Report Card listed above. Patient Active Problem List    Diagnosis    Uncontrolled type 2 diabetes mellitus with hyperglycemia ([facility] [address] [facility]    Legal blindness, as defined in [place]    Severe obesity (BMI 35.0-39. 9)    Type II or unspecified type diabetes mellitus without mention of complication, uncontrolled    Hyperlipidemia    Diabetic peripheral neuropathy associated with type 2 diabetes mellitus (HCC)    Post concussive syndrome    Partial epilepsy with impairment of consciousness, intractable (HCC)    Intractable migraine without aura    Headache(784.0)       Reviewed PmHx, RxHx, FmHx, SocHx, AllgHx--dated and updated in the chart. Review of Systems - negative except as listed above in the HPI    Objective:     Vitals:    08/15/19 1109   BP: 120/78   Pulse: 86   Resp: 16   Temp: 97.8 °F (36.6 °C)   TempSrc: Oral   SpO2: 96%   Weight: 169 lb 6.4 oz (76.8 kg)   Height: 5' 1\" (1.549 m)     Physical Examination: General appearance - alert, well appearing, and in no distress  Chest - clear to auscultation, no wheezes, rales or rhonchi, symmetric air entry  Heart - normal rate, regular rhythm, normal S1, S2, no murmurs, rubs, clicks or gallops  Abdomen - soft, nontender, nondistended, no masses or organomegaly  Extremities - peripheral pulses normal, no pedal edema, no clubbing or cyanosis      Assessment/ Plan:   Diagnoses and all orders for this visit:    1. Uncontrolled type 2 diabetes mellitus with hyperglycemia (HCC)  -     LIPID PANEL  -     METABOLIC PANEL, COMPREHENSIVE  -     HEMOGLOBIN A1C WITH EAG  -     insulin glargine (LANTUS,BASAGLAR) 100 unit/mL (3 mL) inpn; 65 Units by SubCUTAneous route daily for 90 days. -inc dose by 15 units  -not at goal    2. Pure hypercholesterolemia  -     LIPID PANEL  -     METABOLIC PANEL, COMPREHENSIVE    3.  Partial epilepsy with impairment of consciousness, intractable (HCC)  -     gabapentin (NEURONTIN) 300 mg capsule; Take 1 Cap by mouth nightly. Follow-up and Dispositions    · Return in about 3 months (around 11/15/2019) for dm. Lab Results   Component Value Date/Time    Cholesterol, total 201 (H) 05/29/2019 10:37 AM    HDL Cholesterol 42 05/29/2019 10:37 AM    LDL, calculated 114 (H) 05/29/2019 10:37 AM    Triglyceride 224 (H) 05/29/2019 10:37 AM     Lab Results   Component Value Date/Time    Hemoglobin A1c 12.9 (H) 05/29/2019 10:37 AM    Hemoglobin A1c 13.0 (H) 02/06/2019 11:57 AM    Hemoglobin A1c 10.2 (H) 10/24/2018 10:36 AM    Microalb/Creat ratio (ug/mg creat.) 4.6 10/24/2018 10:36 AM    LDL, calculated 114 (H) 05/29/2019 10:37 AM    Creatinine 0.78 05/29/2019 10:37 AM          Discussed with patient goal of Diabetes to include:  HgA1C <7, LDL cholesterol <100, Blood pressure <140/80. Discussed with patient diet and weight management and to get regular exercise. Recommend yearly eye exams and daily foot care. The patient understands and agrees with the plan. I have discussed the diagnosis with the patient and the intended plan as seen in the above orders. The patient has received an after-visit summary and questions were answered concerning future plans. Medication Side Effects and Warnings were discussed with patient  Patient Labs were reviewed and or requested  Patient Past Records were reviewed and or requested    Nikia Chávez M.D. [facility]    There are no Patient Instructions on file for this visit.

## 2019-08-16 LAB
ALBUMIN SERPL-MCNC: 4 G/DL (ref 3.6–4.8)
ALBUMIN/GLOB SERPL: 1.5 {RATIO} (ref 1.2–2.2)
ALP SERPL-CCNC: 92 IU/L (ref 39–117)
ALT SERPL-CCNC: 10 IU/L (ref 0–32)
AST SERPL-CCNC: 15 IU/L (ref 0–40)
BILIRUB SERPL-MCNC: 0.2 MG/DL (ref 0–1.2)
BUN SERPL-MCNC: 8 MG/DL (ref 8–27)
BUN/CREAT SERPL: 13 (ref 12–28)
CALCIUM SERPL-MCNC: 9.4 MG/DL (ref 8.7–10.3)
CHLORIDE SERPL-SCNC: 98 MMOL/L (ref 96–106)
CHOLEST SERPL-MCNC: 162 MG/DL (ref 100–199)
CO2 SERPL-SCNC: 25 MMOL/L (ref 20–29)
CREAT SERPL-MCNC: 0.62 MG/DL (ref 0.57–1)
EST. AVERAGE GLUCOSE BLD GHB EST-MCNC: 286 MG/DL
GLOBULIN SER CALC-MCNC: 2.6 G/DL (ref 1.5–4.5)
GLUCOSE SERPL-MCNC: 155 MG/DL (ref 65–99)
HBA1C MFR BLD: 11.6 % (ref 4.8–5.6)
HDLC SERPL-MCNC: 42 MG/DL
INTERPRETATION, 910389: NORMAL
LDLC SERPL CALC-MCNC: 91 MG/DL (ref 0–99)
Lab: NORMAL
POTASSIUM SERPL-SCNC: 3.3 MMOL/L (ref 3.5–5.2)
PROT SERPL-MCNC: 6.6 G/DL (ref 6–8.5)
SODIUM SERPL-SCNC: 140 MMOL/L (ref 134–144)
TRIGL SERPL-MCNC: 144 MG/DL (ref 0–149)
VLDLC SERPL CALC-MCNC: 29 MG/DL (ref 5–40)

## 2019-08-19 NOTE — PROGRESS NOTES
ID verified X 3- informed DM not at goal & increase insulin as discussed- voiced understanding- No surgery until sugars are at goal- voiced understanding.

## 2019-10-24 ENCOUNTER — TELEPHONE (OUTPATIENT)
Dept: NEUROLOGY | Age: 64
End: 2019-10-24

## 2019-10-25 NOTE — TELEPHONE ENCOUNTER
----- Message from Eileen Hood sent at 10/25/2019  1:19 PM EDT -----  Regarding: ERWIN Son/Telephone  Pt requesting a call back in regards to migraine headaches. Pt states the OTC Excedrin is not working. Pt states she has tried \"Fioricet w/Codeine\". Best contact [phone].

## 2019-10-28 NOTE — TELEPHONE ENCOUNTER
Can you find out how many headaches she is having a week and a month. She would probably benefit from prevention (aimovig) as last viist she was having 3 a week. Can try Fioricet or something more specific for headaches such as imitrex, maxalt.

## 2019-10-29 DIAGNOSIS — G43.919 INTRACTABLE MIGRAINE WITHOUT STATUS MIGRAINOSUS, UNSPECIFIED MIGRAINE TYPE: Primary | ICD-10-CM

## 2019-10-29 RX ORDER — BUTALBITAL, ACETAMINOPHEN, CAFFEINE AND CODEINE PHOSPHATE 50; 325; 40; 30 MG/1; MG/1; MG/1; MG/1
1 CAPSULE ORAL
Qty: 30 CAP | Refills: 1 | Status: SHIPPED | OUTPATIENT
Start: 2019-10-29 | End: 2019-11-28

## 2019-10-29 NOTE — TELEPHONE ENCOUNTER
Patient is having 3 headaches per week. She has tried fioricet with codeine and that took the headache away completely . Patient would like that to be sent to her pharmacy.

## 2019-11-14 ENCOUNTER — OFFICE VISIT (OUTPATIENT)
Dept: FAMILY MEDICINE CLINIC | Age: 64
End: 2019-11-14

## 2019-11-14 ENCOUNTER — HOSPITAL ENCOUNTER (OUTPATIENT)
Dept: LAB | Age: 64
Discharge: HOME | End: 2019-11-14

## 2019-11-14 VITALS
BODY MASS INDEX: 31.91 KG/M2 | HEART RATE: 101 BPM | RESPIRATION RATE: 16 BRPM | SYSTOLIC BLOOD PRESSURE: 120 MMHG | HEIGHT: 61 IN | DIASTOLIC BLOOD PRESSURE: 80 MMHG | OXYGEN SATURATION: 95 % | TEMPERATURE: 97.9 F | WEIGHT: 169 LBS

## 2019-11-14 DIAGNOSIS — E11.42 DIABETIC PERIPHERAL NEUROPATHY ASSOCIATED WITH TYPE 2 DIABETES MELLITUS (HCC): Primary | ICD-10-CM

## 2019-11-14 DIAGNOSIS — G40.219 PARTIAL EPILEPSY WITH IMPAIRMENT OF CONSCIOUSNESS, INTRACTABLE (HCC): ICD-10-CM

## 2019-11-14 DIAGNOSIS — E78.00 PURE HYPERCHOLESTEROLEMIA: ICD-10-CM

## 2019-11-14 DIAGNOSIS — E11.65 UNCONTROLLED TYPE 2 DIABETES MELLITUS WITH HYPERGLYCEMIA (HCC): ICD-10-CM

## 2019-11-14 DIAGNOSIS — E11.42 DIABETIC PERIPHERAL NEUROPATHY ASSOCIATED WITH TYPE 2 DIABETES MELLITUS (HCC): ICD-10-CM

## 2019-11-14 LAB
ALBUMIN SERPL-MCNC: 3.8 G/DL (ref 3.5–5)
ALBUMIN/GLOB SERPL: 1.1 {RATIO} (ref 1.1–2.2)
ALP SERPL-CCNC: 117 U/L (ref 45–117)
ALT SERPL-CCNC: 34 U/L (ref 12–78)
ANION GAP SERPL CALC-SCNC: 8 MMOL/L (ref 5–15)
AST SERPL-CCNC: 38 U/L (ref 15–37)
BASOPHILS # BLD: 0.1 K/UL (ref 0–0.1)
BASOPHILS NFR BLD: 1 % (ref 0–1)
BILIRUB SERPL-MCNC: 0.3 MG/DL (ref 0.2–1)
BUN SERPL-MCNC: 18 MG/DL (ref 6–20)
BUN/CREAT SERPL: 24 (ref 12–20)
CALCIUM SERPL-MCNC: 9.9 MG/DL (ref 8.5–10.1)
CHLORIDE SERPL-SCNC: 99 MMOL/L (ref 97–108)
CHOLEST SERPL-MCNC: 226 MG/DL
CO2 SERPL-SCNC: 29 MMOL/L (ref 21–32)
CREAT SERPL-MCNC: 0.75 MG/DL (ref 0.55–1.02)
CREAT UR-MCNC: 121 MG/DL
DIFFERENTIAL METHOD BLD: ABNORMAL
EOSINOPHIL # BLD: 0.1 K/UL (ref 0–0.4)
EOSINOPHIL NFR BLD: 1 % (ref 0–7)
ERYTHROCYTE [DISTWIDTH] IN BLOOD BY AUTOMATED COUNT: 12.8 % (ref 11.5–14.5)
EST. AVERAGE GLUCOSE BLD GHB EST-MCNC: 278 MG/DL
GLOBULIN SER CALC-MCNC: 3.6 G/DL (ref 2–4)
GLUCOSE SERPL-MCNC: 234 MG/DL (ref 65–100)
HBA1C MFR BLD: 11.3 % (ref 4–5.6)
HCT VFR BLD AUTO: 44.8 % (ref 35–47)
HDLC SERPL-MCNC: 44 MG/DL
HDLC SERPL: 5.1 {RATIO} (ref 0–5)
HGB BLD-MCNC: 14.8 G/DL (ref 11.5–16)
IMM GRANULOCYTES # BLD AUTO: 0.1 K/UL (ref 0–0.04)
IMM GRANULOCYTES NFR BLD AUTO: 1 % (ref 0–0.5)
LDLC SERPL CALC-MCNC: 131.6 MG/DL (ref 0–100)
LIPID PROFILE,FLP: ABNORMAL
LYMPHOCYTES # BLD: 3 K/UL (ref 0.8–3.5)
LYMPHOCYTES NFR BLD: 40 % (ref 12–49)
MCH RBC QN AUTO: 29.4 PG (ref 26–34)
MCHC RBC AUTO-ENTMCNC: 33 G/DL (ref 30–36.5)
MCV RBC AUTO: 88.9 FL (ref 80–99)
MICROALBUMIN UR-MCNC: 0.88 MG/DL
MICROALBUMIN/CREAT UR-RTO: 7 MG/G (ref 0–30)
MONOCYTES # BLD: 0.3 K/UL (ref 0–1)
MONOCYTES NFR BLD: 5 % (ref 5–13)
NEUTS SEG # BLD: 4 K/UL (ref 1.8–8)
NEUTS SEG NFR BLD: 52 % (ref 32–75)
NRBC # BLD: 0 K/UL (ref 0–0.01)
NRBC BLD-RTO: 0 PER 100 WBC
PLATELET # BLD AUTO: 256 K/UL (ref 150–400)
PMV BLD AUTO: 10.6 FL (ref 8.9–12.9)
POTASSIUM SERPL-SCNC: 4.3 MMOL/L (ref 3.5–5.1)
PROT SERPL-MCNC: 7.4 G/DL (ref 6.4–8.2)
RBC # BLD AUTO: 5.04 M/UL (ref 3.8–5.2)
SODIUM SERPL-SCNC: 136 MMOL/L (ref 136–145)
TRIGL SERPL-MCNC: 252 MG/DL (ref ?–150)
TSH SERPL DL<=0.05 MIU/L-ACNC: 2.48 UIU/ML (ref 0.36–3.74)
VALPROATE SERPL-MCNC: 96 UG/ML (ref 50–100)
VLDLC SERPL CALC-MCNC: 50.4 MG/DL
WBC # BLD AUTO: 7.5 K/UL (ref 3.6–11)

## 2019-11-14 RX ORDER — INSULIN GLARGINE 100 [IU]/ML
100 INJECTION, SOLUTION SUBCUTANEOUS DAILY
Qty: 15 PEN | Refills: 1 | Status: SHIPPED | OUTPATIENT
Start: 2019-11-14 | End: 2019-11-20 | Stop reason: SDUPTHER

## 2019-11-15 DIAGNOSIS — E11.65 UNCONTROLLED TYPE 2 DIABETES MELLITUS WITH HYPERGLYCEMIA (HCC): ICD-10-CM

## 2019-11-18 RX ORDER — METFORMIN HYDROCHLORIDE 500 MG/1
TABLET, EXTENDED RELEASE ORAL
Qty: 360 TAB | Refills: 1 | Status: SHIPPED | OUTPATIENT
Start: 2019-11-18 | End: 2020-02-12 | Stop reason: SDUPTHER

## 2019-11-20 ENCOUNTER — TELEPHONE (OUTPATIENT)
Dept: FAMILY MEDICINE CLINIC | Age: 64
End: 2019-11-20

## 2019-11-20 DIAGNOSIS — E11.65 UNCONTROLLED TYPE 2 DIABETES MELLITUS WITH HYPERGLYCEMIA (HCC): ICD-10-CM

## 2019-11-20 DIAGNOSIS — E11.42 DIABETIC PERIPHERAL NEUROPATHY ASSOCIATED WITH TYPE 2 DIABETES MELLITUS (HCC): ICD-10-CM

## 2019-11-20 RX ORDER — INSULIN GLARGINE 100 [IU]/ML
100 INJECTION, SOLUTION SUBCUTANEOUS DAILY
Qty: 15 PEN | Refills: 1 | Status: SHIPPED | OUTPATIENT
Start: 2019-11-20 | End: 2020-02-18

## 2019-11-20 NOTE — PROGRESS NOTES
Patient called and reviewed lab results. Instructed tapering insulin. Patient states she needs refills to [facility] on insulin and trulicity. . Sent.

## 2019-11-20 NOTE — TELEPHONE ENCOUNTER
Patient returning call about labs and would like to discuss a prescription. Please call back at [phone].

## 2020-02-12 ENCOUNTER — OFFICE VISIT (OUTPATIENT)
Dept: FAMILY MEDICINE CLINIC | Age: 65
End: 2020-02-12

## 2020-02-12 VITALS
BODY MASS INDEX: 31.75 KG/M2 | TEMPERATURE: 98 F | HEIGHT: 61 IN | RESPIRATION RATE: 16 BRPM | SYSTOLIC BLOOD PRESSURE: 102 MMHG | DIASTOLIC BLOOD PRESSURE: 60 MMHG | OXYGEN SATURATION: 97 % | WEIGHT: 168.2 LBS | HEART RATE: 93 BPM

## 2020-02-12 DIAGNOSIS — G43.719 INTRACTABLE CHRONIC MIGRAINE WITHOUT AURA AND WITHOUT STATUS MIGRAINOSUS: ICD-10-CM

## 2020-02-12 DIAGNOSIS — E78.00 PURE HYPERCHOLESTEROLEMIA: ICD-10-CM

## 2020-02-12 DIAGNOSIS — E11.65 UNCONTROLLED TYPE 2 DIABETES MELLITUS WITH HYPERGLYCEMIA (HCC): Primary | ICD-10-CM

## 2020-02-12 DIAGNOSIS — M62.838 MUSCLE SPASM: ICD-10-CM

## 2020-02-12 DIAGNOSIS — G43.809 CERVICOGENIC MIGRAINE: ICD-10-CM

## 2020-02-12 RX ORDER — CYCLOBENZAPRINE HCL 10 MG
10 TABLET ORAL
Qty: 180 TAB | Refills: 1 | Status: SHIPPED | OUTPATIENT
Start: 2020-02-12 | End: 2020-09-16 | Stop reason: SDUPTHER

## 2020-02-12 RX ORDER — MELATONIN
5000 DAILY
Qty: 90 TAB | Refills: 3 | Status: SHIPPED | OUTPATIENT
Start: 2020-02-12 | End: 2022-02-16 | Stop reason: SDUPTHER

## 2020-02-12 RX ORDER — METFORMIN HYDROCHLORIDE 500 MG/1
TABLET, EXTENDED RELEASE ORAL
Qty: 360 TAB | Refills: 1 | Status: SHIPPED | OUTPATIENT
Start: 2020-02-12 | End: 2021-01-04 | Stop reason: SDUPTHER

## 2020-02-12 RX ORDER — DIVALPROEX SODIUM 250 MG/1
TABLET, DELAYED RELEASE ORAL
Qty: 180 TAB | Refills: 5 | Status: SHIPPED | OUTPATIENT
Start: 2020-02-12 | End: 2021-03-24 | Stop reason: SDUPTHER

## 2020-02-12 NOTE — PROGRESS NOTES
Chief Complaint   Patient presents with    Diabetes    Labs     Fasting today   [facility] 2/3/2020: Chest pain, high Glucose BS:467 Swallowing difficuties: Candida esophagitis    Medication Refill     1. Have you been to the ER, urgent care clinic since your last visit? Hospitalized since your last visit? Yes Where: [facility] ED 2/3/2020: Chest pain, high Glucose BS:467 Swallowing difficuties: Candida esophagitis    2. Have you seen or consulted any other health care providers outside of the [address] [place] [address] since your last visit? Include any pap smears or colon screening. Estefania Sin  2/12/2020  Provider:   Irish:  Diabetes Report Card   1) Have you seen the eye doctor in past year?yes    2) How would you  rate your Diabetic Diet? Poor   3) How well do you take care of your feet? Self care   4) Do you keep your Primary Care Follow Up Appts? yes    5) Do you know your A1C goal?yes    6) Do you take your medications daily? yes    7) Do you check your blood sugars? yes    8) Have you gained weight?no       9) Do you follow an exercise program?no    10) Can you do better?no      Lab Results   Component Value Date/Time    Cholesterol, total 226 (H) 11/14/2019 11:15 AM    HDL Cholesterol 44 11/14/2019 11:15 AM    LDL, calculated 131.6 (H) 11/14/2019 11:15 AM    Triglyceride 252 (H) 11/14/2019 11:15 AM    CHOL/HDL Ratio 5.1 (H) 11/14/2019 11:15 AM     Lab Results   Component Value Date/Time    Hemoglobin A1c 11.3 (H) 11/14/2019 11:15 AM    Hemoglobin A1c 11.6 (H) 08/15/2019 11:44 AM    Hemoglobin A1c 12.9 (H) 05/29/2019 10:37 AM    Glucose 234 (H) 11/14/2019 11:15 AM    Glucose (POC) 282 (H) 01/30/2019 02:57 PM    Microalbumin/Creat ratio (mg/g creat) 7 11/14/2019 11:16 AM    Microalbumin,urine random 0.88 11/14/2019 11:16 AM    LDL, calculated 131.6 (H) 11/14/2019 11:15 AM    Creatinine 0.75 11/14/2019 11:15 AM      Lab Results   Component Value Date/Time Microalbumin/Creat ratio (mg/g creat) 7 11/14/2019 11:16 AM    Microalbumin,urine random 0.88 11/14/2019 11:16 AM      Chief Complaint   Patient presents with    Diabetes    Labs     Fasting today   [facility] 2/3/2020: Chest pain, high Glucose BS:467 Swallowing difficuties: Candida esophagitis    Medication Refill     she is a 76y.o. year old female who presents for evaluation. See Diabetic Report Card listed above. Patient Active Problem List    Diagnosis    Uncontrolled type 2 diabetes mellitus with hyperglycemia ([address]    Legal blindness, as defined in [place]    Severe obesity (BMI 35.0-39. 9)    Type II or unspecified type diabetes mellitus without mention of complication, uncontrolled    Hyperlipidemia    Diabetic peripheral neuropathy associated with type 2 diabetes mellitus (HCC)    Post concussive syndrome    Partial epilepsy with impairment of consciousness, intractable (HCC)    Intractable migraine without aura    Headache(784.0)       Reviewed PmHx, RxHx, FmHx, SocHx, AllgHx--dated and updated in the chart. Review of Systems - negative except as listed above in the HPI    Objective:     Vitals:    02/12/20 1014   BP: 102/60   Pulse: 93   Resp: 16   Temp: 98 °F (36.7 °C)   TempSrc: Oral   SpO2: 97%   Weight: 168 lb 3.2 oz (76.3 kg)   Height: 5' 1\" (1.549 m)     Physical Examination: General appearance - alert, well appearing, and in no distress  Neck - supple, no significant adenopathy  Chest - clear to auscultation, no wheezes, rales or rhonchi, symmetric air entry  Heart - normal rate, regular rhythm, normal S1, S2, no murmurs, rubs, clicks or gallops  Abdomen - soft, nontender, nondistended, no masses or organomegaly  Extremities - peripheral pulses normal, no pedal edema, no clubbing or cyanosis      Assessment/ Plan:   Diagnoses and all orders for this visit:    1.  Uncontrolled type 2 diabetes mellitus with hyperglycemia ([address]  - metFORMIN ER (GLUCOPHAGE XR) 500 mg tablet; Titrate gradually up until taking 4 tablets daily  -not at goal  -inc rx to 4 tabs taper  -labs at next OV  -dwp diet changes    2. Intractable chronic migraine without aura and without status migrainosus  -     cyclobenzaprine (FLEXERIL) 10 mg tablet; Take 1 Tab by mouth two (2) times daily as needed for Muscle Spasm(s). -     divalproex DR (DEPAKOTE) 250 mg tablet; Take 3 tablets by mouth in the AM and 3 tablets by mouth QHS    3. Muscle spasm  -     cyclobenzaprine (FLEXERIL) 10 mg tablet; Take 1 Tab by mouth two (2) times daily as needed for Muscle Spasm(s). -     divalproex DR (DEPAKOTE) 250 mg tablet; Take 3 tablets by mouth in the AM and 3 tablets by mouth QHS    4. Cervicogenic migraine  -     cyclobenzaprine (FLEXERIL) 10 mg tablet; Take 1 Tab by mouth two (2) times daily as needed for Muscle Spasm(s). -     divalproex DR (DEPAKOTE) 250 mg tablet; Take 3 tablets by mouth in the AM and 3 tablets by mouth QHS    5. Pure hypercholesterolemia    Other orders  -     cholecalciferol (VITAMIN D3) (1000 Units /25 mcg) tablet; Take 5 Tabs by mouth daily. Follow-up and Dispositions    · Return in about 6 weeks (around 3/25/2020).        Lab Results   Component Value Date/Time    Cholesterol, total 226 (H) 11/14/2019 11:15 AM    HDL Cholesterol 44 11/14/2019 11:15 AM    LDL, calculated 131.6 (H) 11/14/2019 11:15 AM    Triglyceride 252 (H) 11/14/2019 11:15 AM    CHOL/HDL Ratio 5.1 (H) 11/14/2019 11:15 AM     Lab Results   Component Value Date/Time    Hemoglobin A1c 11.3 (H) 11/14/2019 11:15 AM    Hemoglobin A1c 11.6 (H) 08/15/2019 11:44 AM    Hemoglobin A1c 12.9 (H) 05/29/2019 10:37 AM    Microalbumin/Creat ratio (mg/g creat) 7 11/14/2019 11:16 AM    Microalbumin,urine random 0.88 11/14/2019 11:16 AM    LDL, calculated 131.6 (H) 11/14/2019 11:15 AM    Creatinine 0.75 11/14/2019 11:15 AM          Discussed with patient goal of Diabetes to include:  HgA1C <7, LDL cholesterol <100, Blood pressure <140/80. Discussed with patient diet and weight management and to get regular exercise. Recommend yearly eye exams and daily foot care. The patient understands and agrees with the plan. I have discussed the diagnosis with the patient and the intended plan as seen in the above orders. The patient has received an after-visit summary and questions were answered concerning future plans. Medication Side Effects and Warnings were discussed with patient  Patient Labs were reviewed and or requested  Patient Past Records were reviewed and or requested    Td Frank M.D. [id number] [facility]    There are no Patient Instructions on file for this visit.

## 2020-02-27 ENCOUNTER — OFFICE VISIT (OUTPATIENT)
Dept: NEUROLOGY | Age: 65
End: 2020-02-27

## 2020-02-27 VITALS
HEART RATE: 100 BPM | OXYGEN SATURATION: 96 % | DIASTOLIC BLOOD PRESSURE: 70 MMHG | HEIGHT: 61 IN | BODY MASS INDEX: 31.78 KG/M2 | SYSTOLIC BLOOD PRESSURE: 118 MMHG

## 2020-02-27 DIAGNOSIS — G43.919 INTRACTABLE MIGRAINE WITHOUT STATUS MIGRAINOSUS, UNSPECIFIED MIGRAINE TYPE: Primary | ICD-10-CM

## 2020-02-27 DIAGNOSIS — R56.9 SEIZURES (HCC): ICD-10-CM

## 2020-02-27 RX ORDER — BUTALBITAL, ACETAMINOPHEN AND CAFFEINE 50; 325; 40 MG/1; MG/1; MG/1
TABLET ORAL
Qty: 40 TAB | Refills: 5 | Status: SHIPPED | OUTPATIENT
Start: 2020-02-27 | End: 2021-03-25 | Stop reason: SDUPTHER

## 2020-02-27 NOTE — PROGRESS NOTES
Rj Carter is a 76 y.o. female who presents with the following  Chief Complaint   Patient presents with    Follow-up    Medication Refill       HPI     She comes in for a follow up for Migraines. She reports that she is about 1 migraines a week.       She is Currently taking Depakote 250 mg tabs 3 in the morning and 3 in the evening. She endorses compliance. No seizures. Headache Characterization: Shooting sharp  Pain Level:10 /10  Aura: yes   Frequency/Length: 3 a week . She reports that it will last about 6 hours  Location: she reports they are located  The back of her head  Nausea/Vomiting:   Yes/ no   Photophobia:  Yes   Phonophobia: yes   Provoking factors: light  Relieving factors: flexeril   Focal neurologic symptoms with headache:none      Taking Fioricet PRN for migraine. Doing well.                   Allergies   Allergen Reactions    Flexall [Menthol-Aloe Vera Extract] Swelling    Sudafed [Pseudoephedrine Hcl] Other (comments)     Jittery        Current Outpatient Medications   Medication Sig    butalbital-acetaminophen-caffeine (FIORICET, ESGIC) -40 mg per tablet Take 1-2 tablets by mouth every 8 hours PRN for headache.  cyclobenzaprine (FLEXERIL) 10 mg tablet Take 1 Tab by mouth two (2) times daily as needed for Muscle Spasm(s).  divalproex DR (DEPAKOTE) 250 mg tablet Take 3 tablets by mouth in the AM and 3 tablets by mouth QHS    metFORMIN ER (GLUCOPHAGE XR) 500 mg tablet Titrate gradually up until taking 4 tablets daily    cholecalciferol (VITAMIN D3) (1000 Units /25 mcg) tablet Take 5 Tabs by mouth daily.  dulaglutide (TRULICITY) 1.5 TG/1.7 mL sub-q pen 0.5 mL by SubCUTAneous route every seven (7) days.  gabapentin (NEURONTIN) 300 mg capsule Take 1 Cap by mouth nightly.  Blood-Glucose Meter (FREESTYLE LITE METER) monitoring kit Check sugars bid.  rosuvastatin (CRESTOR) 40 mg tablet Take 1 Tab by mouth nightly.     acetaminophen-caffeine 500-65 mg (EXCEDRINE TENSION HEADACHE) 500-65 mg tab Take 1 Tab by mouth every eight (8) hours as needed for Headache.  Insulin Needles, Disposable, 31 gauge x 5/16\" ndle Using with Lantus and Trulicity, 8 per week, Dx: E11.65    omeprazole (PRILOSEC) 20 mg capsule Take 1 Cap by mouth daily.  Blood-Glucose Meter monitoring kit Testing BID, dx: E11.65    glucose blood VI test strips (ASCENSIA AUTODISC VI, ONE TOUCH ULTRA TEST VI) strip Testing BID, dx: E11.65    lancets misc Testing BID, dx: E11.65    naloxone (NARCAN) 4 mg/actuation nasal spray Use 1 spray intranasally, then discard. Repeat with new spray every 2 min as needed for opioid overdose symptoms, alternating nostrils.  celecoxib (CELEBREX) 200 mg capsule Take  by mouth two (2) times a day.  PARoxetine (PAXIL) 20 mg tablet Take  by mouth daily.  ALPRAZOLAM (XANAX PO) Take  by mouth. Take as needed.  diphenhydrAMINE (BENADRYL ALLERGY) 25 mg tablet Take 25 mg by mouth every six (6) hours as needed. No current facility-administered medications for this visit.         Social History     Tobacco Use   Smoking Status Never Smoker   Smokeless Tobacco Never Used       Past Medical History:   Diagnosis Date    Diabetic peripheral neuropathy associated with type 2 diabetes mellitus (HCC)     Dyslipidemia     Hx of migraines     Hyperlipidemia     Type II or unspecified type diabetes mellitus without mention of complication, uncontrolled     Unspecified epilepsy without mention of intractable epilepsy     Vision decreased        Past Surgical History:   Procedure Laterality Date    COLONOSCOPY N/A 1/30/2019    COLONOSCOPY performed by Gracia Suero MD at [address] [place] HX CATARACT REMOVAL Bilateral     HX COLONOSCOPY      HX ENDOSCOPY  04/2016    EGD, stretched her esopaghus, recurrent issue     HX GYN Bilateral     tubaligation    HX ORTHOPAEDIC      both knees, L shoulder     HX REFRACTIVE SURGERY  02/05/2012    right eye    HX RETINAL DETACHMENT REPAIR Bilateral        Family History   Problem Relation Age of Onset    Cancer Mother     Cancer Other     Heart Disease Father        Social History     Socioeconomic History    Marital status:      Spouse name: Not on file    Number of children: Not on file    Years of education: Not on file    Highest education level: Not on file   Tobacco Use    Smoking status: Never Smoker    Smokeless tobacco: Never Used   Substance and Sexual Activity    Alcohol use: No    Sexual activity: Never     Partners: Male       Review of Systems   Eyes: Positive for blurred vision and photophobia. Negative for double vision. Gastrointestinal: Positive for nausea. Negative for vomiting. Neurological: Positive for headaches. Negative for dizziness, tingling, tremors, seizures and loss of consciousness. Remainder of comprehensive review is negative. Physical Exam :    Visit Vitals  /70   Pulse 100   Ht 5' 1\" (1.549 m)   SpO2 96%   BMI 31.78 kg/m²       General: Well defined, nourished, and groomed individual in no acute distress.    Neck: Supple, nontender, no bruits, no pain with resistance to active range of motion.    Heart: Regular rate and rhythm, no murmurs, rub, or gallop. Normal S1S2. Lungs: Clear to auscultation bilaterally with equal chest expansion, no cough, no wheeze  Musculoskeletal: Extremities revealed no edema and had full range of motion of joints.    Psych: Good mood and bright affect    NEUROLOGICAL EXAMINATION:    Mental Status: Alert and oriented to person, place, and time    Cranial Nerves:    II, III, IV, VI: Visual acuity grossly intact. Visual fields are normal.    Pupils are equal, round, and reactive to light and accommodation.    Extra-ocular movements are full and fluid. Fundoscopic exam was benign, no ptosis or nystagmus.    V-XII: Hearing is grossly intact. Facial features are symmetric, with normal sensation and strength.  The palate rises symmetrically and the tongue protrudes midline. Sternocleidomastoids 5/5. Motor Examination: Normal tone, bulk, and strength, 5/5 muscle strength throughout. Coordination: Finger to nose was normal. No resting or intention tremor    Gait and Station: Steady while walking. Normal arm swing. No pronator drift. No muscle wasting or fasiculations noted. Reflexes: DTRs 2+ throughout. Neurosensory Exam:  Stocking glove sensory loss to temperature, vibration, and pinprick to the thighs. Results for orders placed or performed during the hospital encounter of 11/14/19   LIPID PANEL   Result Value Ref Range    LIPID PROFILE          Cholesterol, total 226 (H) <200 MG/DL    Triglyceride 252 (H) <150 MG/DL    HDL Cholesterol 44 MG/DL    LDL, calculated 131.6 (H) 0 - 100 MG/DL    VLDL, calculated 50.4 MG/DL    CHOL/HDL Ratio 5.1 (H) 0.0 - 5.0     METABOLIC PANEL, COMPREHENSIVE   Result Value Ref Range    Sodium 136 136 - 145 mmol/L    Potassium 4.3 3.5 - 5.1 mmol/L    Chloride 99 97 - 108 mmol/L    CO2 29 21 - 32 mmol/L    Anion gap 8 5 - 15 mmol/L    Glucose 234 (H) 65 - 100 mg/dL    BUN 18 6 - 20 MG/DL    Creatinine 0.75 0.55 - 1.02 MG/DL    BUN/Creatinine ratio 24 (H) 12 - 20      GFR est AA >60 >60 ml/min/1.73m2    GFR est non-AA >60 >60 ml/min/1.73m2    Calcium 9.9 8.5 - 10.1 MG/DL    Bilirubin, total 0.3 0.2 - 1.0 MG/DL    ALT (SGPT) 34 12 - 78 U/L    AST (SGOT) 38 (H) 15 - 37 U/L    Alk.  phosphatase 117 45 - 117 U/L    Protein, total 7.4 6.4 - 8.2 g/dL    Albumin 3.8 3.5 - 5.0 g/dL    Globulin 3.6 2.0 - 4.0 g/dL    A-G Ratio 1.1 1.1 - 2.2     HEMOGLOBIN A1C WITH EAG   Result Value Ref Range    Hemoglobin A1c 11.3 (H) 4.0 - 5.6 %    Est. average glucose 278 mg/dL   TSH 3RD GENERATION   Result Value Ref Range    TSH 2.48 0.36 - 3.74 uIU/mL   CBC WITH AUTOMATED DIFF   Result Value Ref Range    WBC 7.5 3.6 - 11.0 K/uL    RBC 5.04 3.80 - 5.20 M/uL    HGB 14.8 11.5 - 16.0 g/dL    HCT 44.8 35.0 - 47.0 %    MCV 88.9 80.0 - 99.0 FL    MCH 29.4 26.0 - 34.0 PG    MCHC 33.0 30.0 - 36.5 g/dL    RDW 12.8 11.5 - 14.5 %    PLATELET 584 505 - 145 K/uL    MPV 10.6 8.9 - 12.9 FL    NRBC 0.0 0  WBC    ABSOLUTE NRBC 0.00 0.00 - 0.01 K/uL    NEUTROPHILS 52 32 - 75 %    LYMPHOCYTES 40 12 - 49 %    MONOCYTES 5 5 - 13 %    EOSINOPHILS 1 0 - 7 %    BASOPHILS 1 0 - 1 %    IMMATURE GRANULOCYTES 1 (H) 0.0 - 0.5 %    ABS. NEUTROPHILS 4.0 1.8 - 8.0 K/UL    ABS. LYMPHOCYTES 3.0 0.8 - 3.5 K/UL    ABS. MONOCYTES 0.3 0.0 - 1.0 K/UL    ABS. EOSINOPHILS 0.1 0.0 - 0.4 K/UL    ABS. BASOPHILS 0.1 0.0 - 0.1 K/UL    ABS. IMM. GRANS. 0.1 (H) 0.00 - 0.04 K/UL    DF AUTOMATED     MICROALBUMIN, UR, RAND W/ MICROALB/CREAT RATIO   Result Value Ref Range    Microalbumin,urine random 0.88 MG/DL    Creatinine, urine 121.00 mg/dL    Microalbumin/Creat ratio (mg/g creat) 7 0 - 30 mg/g   VALPROIC ACID   Result Value Ref Range    Valproic acid 96 50 - 100 ug/ml       Orders Placed This Encounter    butalbital-acetaminophen-caffeine (FIORICET, ESGIC) -40 mg per tablet     Sig: Take 1-2 tablets by mouth every 8 hours PRN for headache. Dispense:  40 Tab     Refill:  5       No diagnosis found.                 This note will not be viewable in [employer]

## 2020-03-10 ENCOUNTER — OFFICE VISIT (OUTPATIENT)
Dept: FAMILY MEDICINE CLINIC | Age: 65
End: 2020-03-10

## 2020-03-10 VITALS
WEIGHT: 163.8 LBS | BODY MASS INDEX: 30.93 KG/M2 | OXYGEN SATURATION: 97 % | SYSTOLIC BLOOD PRESSURE: 108 MMHG | RESPIRATION RATE: 18 BRPM | DIASTOLIC BLOOD PRESSURE: 71 MMHG | TEMPERATURE: 98.2 F | HEART RATE: 98 BPM | HEIGHT: 61 IN

## 2020-03-10 DIAGNOSIS — J06.9 UPPER RESPIRATORY TRACT INFECTION, UNSPECIFIED TYPE: Primary | ICD-10-CM

## 2020-03-10 DIAGNOSIS — G40.219 PARTIAL EPILEPSY WITH IMPAIRMENT OF CONSCIOUSNESS, INTRACTABLE (HCC): ICD-10-CM

## 2020-03-10 DIAGNOSIS — E11.65 UNCONTROLLED TYPE 2 DIABETES MELLITUS WITH HYPERGLYCEMIA (HCC): ICD-10-CM

## 2020-03-10 RX ORDER — BENZONATATE 100 MG/1
100 CAPSULE ORAL
Qty: 60 CAP | Refills: 1 | Status: SHIPPED | OUTPATIENT
Start: 2020-03-10 | End: 2020-03-11 | Stop reason: SDUPTHER

## 2020-03-10 RX ORDER — AZITHROMYCIN 250 MG/1
TABLET, FILM COATED ORAL
Qty: 6 TAB | Refills: 0 | Status: SHIPPED | OUTPATIENT
Start: 2020-03-10 | End: 2020-03-10 | Stop reason: SDUPTHER

## 2020-03-10 RX ORDER — GABAPENTIN 300 MG/1
300 CAPSULE ORAL
Qty: 30 CAP | Refills: 5 | Status: SHIPPED | OUTPATIENT
Start: 2020-03-10 | End: 2020-03-11 | Stop reason: SDUPTHER

## 2020-03-10 RX ORDER — LEVOCETIRIZINE DIHYDROCHLORIDE 5 MG/1
5 TABLET, FILM COATED ORAL DAILY
Qty: 30 TAB | Refills: 5 | Status: SHIPPED | OUTPATIENT
Start: 2020-03-10 | End: 2020-03-10 | Stop reason: SDUPTHER

## 2020-03-10 RX ORDER — BENZONATATE 100 MG/1
100 CAPSULE ORAL
Qty: 60 CAP | Refills: 1 | Status: SHIPPED | OUTPATIENT
Start: 2020-03-10 | End: 2020-03-10 | Stop reason: SDUPTHER

## 2020-03-10 RX ORDER — LEVOCETIRIZINE DIHYDROCHLORIDE 5 MG/1
5 TABLET, FILM COATED ORAL DAILY
Qty: 30 TAB | Refills: 5 | Status: SHIPPED | OUTPATIENT
Start: 2020-03-10 | End: 2020-03-11 | Stop reason: SDUPTHER

## 2020-03-10 RX ORDER — AZITHROMYCIN 250 MG/1
TABLET, FILM COATED ORAL
Qty: 6 TAB | Refills: 0 | Status: SHIPPED | OUTPATIENT
Start: 2020-03-10 | End: 2020-03-11 | Stop reason: SDUPTHER

## 2020-03-10 NOTE — PROGRESS NOTES
Chief Complaint   Patient presents with    Cough     X 17 days    Diabetes    Back Pain    Medication Refill     Gabapentin     1. Have you been to the ER, urgent care clinic since your last visit? Hospitalized since your last visit? No    2. Have you seen or consulted any other health care providers outside of the [address] [place] [address] since your last visit? Include any pap smears or colon screening. No    Lab Results   Component Value Date/Time    Microalbumin/Creat ratio (mg/g creat) 7 11/14/2019 11:16 AM    Microalbumin,urine random 0.88 11/14/2019 11:16 AM      Chief Complaint   Patient presents with    Cough     X 17 days    Diabetes    Back Pain    Medication Refill     Gabapentin     she is a 76y.o. year old female who presents for evaluation. See Diabetic Report Card listed above. Patient Active Problem List    Diagnosis    Uncontrolled type 2 diabetes mellitus with hyperglycemia ([facility]    Legal blindness, as defined in [place]    Severe obesity (BMI 35.0-39. 9)    Type II or unspecified type diabetes mellitus without mention of complication, uncontrolled    Hyperlipidemia    Diabetic peripheral neuropathy associated with type 2 diabetes mellitus (HCC)    Post concussive syndrome    Partial epilepsy with impairment of consciousness, intractable (HCC)    Intractable migraine without aura    Headache(784.0)       Reviewed PmHx, RxHx, FmHx, SocHx, AllgHx--dated and updated in the chart.     Review of Systems - negative except as listed above in the HPI    Objective:     Vitals:    03/10/20 1457   BP: 108/71   Pulse: 98   Resp: 18   Temp: 98.2 °F (36.8 °C)   TempSrc: Oral   SpO2: 97%   Weight: 163 lb 12.8 oz (74.3 kg)   Height: 5' 1\" (1.549 m)     Physical Examination: General appearance - alert, well appearing, and in no distress  Neck - supple, no significant adenopathy  Chest - clear to auscultation, no wheezes, rales or rhonchi, symmetric air entry  Heart - normal rate, regular rhythm, normal S1, S2, no murmurs, rubs, clicks or gallops  Abdomen - soft, nontender, nondistended, no masses or organomegaly      Assessment/ Plan:   Diagnoses and all orders for this visit:    1. Upper respiratory tract infection, unspecified type  -     levocetirizine (XYZAL) 5 mg tablet; Take 1 Tab by mouth daily. -     benzonatate (TESSALON PERLES) 100 mg capsule; Take 1 Cap by mouth three (3) times daily as needed for Cough for up to 7 days. -     azithromycin (ZITHROMAX) 250 mg tablet; Take two tablets today then one tablet daily    2. Uncontrolled type 2 diabetes mellitus with hyperglycemia ([facility] [address]  Lab Results   Component Value Date/Time    Hemoglobin A1c 11.3 (H) 11/14/2019 11:15 AM    Hemoglobin A1c (POC) 6.5 05/28/2014 02:09 PM     -dwp poor labs and need for close labs follow-up     3. Partial epilepsy with impairment of consciousness, intractable (HCC)  -     gabapentin (NEURONTIN) 300 mg capsule; Take 1 Cap by mouth nightly. Follow-up and Dispositions    · Return in about 3 months (around 6/10/2020). Lab Results   Component Value Date/Time    Cholesterol, total 226 (H) 11/14/2019 11:15 AM    HDL Cholesterol 44 11/14/2019 11:15 AM    LDL, calculated 131.6 (H) 11/14/2019 11:15 AM    Triglyceride 252 (H) 11/14/2019 11:15 AM    CHOL/HDL Ratio 5.1 (H) 11/14/2019 11:15 AM     Lab Results   Component Value Date/Time    Hemoglobin A1c 11.3 (H) 11/14/2019 11:15 AM    Hemoglobin A1c 11.6 (H) 08/15/2019 11:44 AM    Hemoglobin A1c 12.9 (H) 05/29/2019 10:37 AM    Microalbumin/Creat ratio (mg/g creat) 7 11/14/2019 11:16 AM    Microalbumin,urine random 0.88 11/14/2019 11:16 AM    LDL, calculated 131.6 (H) 11/14/2019 11:15 AM    Creatinine 0.75 11/14/2019 11:15 AM          Discussed with patient goal of Diabetes to include:  HgA1C <7, LDL cholesterol <100, Blood pressure <140/80. Discussed with patient diet and weight management and to get regular exercise.   Recommend yearly eye exams and daily foot care. The patient understands and agrees with the plan. I have discussed the diagnosis with the patient and the intended plan as seen in the above orders. The patient has received an after-visit summary and questions were answered concerning future plans. Medication Side Effects and Warnings were discussed with patient  Patient Labs were reviewed and or requested  Patient Past Records were reviewed and or requested    Rogers Gallego M.D. [facility]    There are no Patient Instructions on file for this visit.

## 2020-03-11 DIAGNOSIS — G40.219 PARTIAL EPILEPSY WITH IMPAIRMENT OF CONSCIOUSNESS, INTRACTABLE (HCC): ICD-10-CM

## 2020-03-11 DIAGNOSIS — J06.9 UPPER RESPIRATORY TRACT INFECTION, UNSPECIFIED TYPE: ICD-10-CM

## 2020-03-11 RX ORDER — AZITHROMYCIN 250 MG/1
TABLET, FILM COATED ORAL
Qty: 6 TAB | Refills: 0 | Status: SHIPPED | OUTPATIENT
Start: 2020-03-11 | End: 2021-01-19

## 2020-03-11 RX ORDER — GABAPENTIN 300 MG/1
300 CAPSULE ORAL
Qty: 30 CAP | Refills: 5 | Status: SHIPPED | OUTPATIENT
Start: 2020-03-11 | End: 2020-09-16

## 2020-03-11 RX ORDER — LEVOCETIRIZINE DIHYDROCHLORIDE 5 MG/1
5 TABLET, FILM COATED ORAL DAILY
Qty: 30 TAB | Refills: 5 | Status: SHIPPED | OUTPATIENT
Start: 2020-03-11 | End: 2022-01-18

## 2020-03-11 RX ORDER — BENZONATATE 100 MG/1
100 CAPSULE ORAL
Qty: 60 CAP | Refills: 1 | Status: SHIPPED | OUTPATIENT
Start: 2020-03-11 | End: 2020-03-18

## 2020-07-16 ENCOUNTER — TELEPHONE (OUTPATIENT)
Dept: FAMILY MEDICINE CLINIC | Age: 65
End: 2020-07-16

## 2020-07-16 RX ORDER — FLUCONAZOLE 150 MG/1
150 TABLET ORAL DAILY
Qty: 1 TAB | Refills: 5 | Status: SHIPPED | OUTPATIENT
Start: 2020-07-16 | End: 2020-07-17

## 2020-07-16 NOTE — TELEPHONE ENCOUNTER
Patient mariolled and states that she was in the ED on Sunday, 07/12/2020. She states that she was treated for a UTI there. Now she says she has a yeast infection. She would like to have Diflucan called in for it. Please send to [place] on file.  Her number is [phone]

## 2020-07-29 RX ORDER — INSULIN GLARGINE 100 [IU]/ML
80 INJECTION, SOLUTION SUBCUTANEOUS DAILY
Qty: 24 ML | Refills: 0 | Status: SHIPPED | OUTPATIENT
Start: 2020-07-29 | End: 2020-08-11 | Stop reason: SDUPTHER

## 2020-07-29 RX ORDER — INSULIN GLARGINE 100 [IU]/ML
INJECTION, SOLUTION SUBCUTANEOUS
COMMUNITY
Start: 2019-02-07 | End: 2020-07-29 | Stop reason: SDUPTHER

## 2020-07-29 NOTE — TELEPHONE ENCOUNTER
Pt is calling in and states she in need for a refill of lantus, Please call into [address]. If pt needs an appt good phone number for her is [phone]. Thanks.

## 2020-07-29 NOTE — TELEPHONE ENCOUNTER
I don't see insulin on her list of current meds, what dose is she taking? She does need to schedule diabetes f/u, she is well overdue, but I will send 1 month supply of lantus to [facility] to give her some time to be seen.

## 2020-08-11 ENCOUNTER — TELEPHONE (OUTPATIENT)
Dept: FAMILY MEDICINE CLINIC | Age: 65
End: 2020-08-11

## 2020-08-11 ENCOUNTER — VIRTUAL VISIT (OUTPATIENT)
Dept: FAMILY MEDICINE CLINIC | Age: 65
End: 2020-08-11
Payer: MEDICARE

## 2020-08-11 DIAGNOSIS — Z00.00 ROUTINE GENERAL MEDICAL EXAMINATION AT A HEALTH CARE FACILITY: Primary | ICD-10-CM

## 2020-08-11 DIAGNOSIS — E11.65 UNCONTROLLED TYPE 2 DIABETES MELLITUS WITH HYPERGLYCEMIA (HCC): ICD-10-CM

## 2020-08-11 DIAGNOSIS — E66.01 SEVERE OBESITY WITH BODY MASS INDEX (BMI) OF 35.0 TO 39.9 WITH SERIOUS COMORBIDITY (HCC): ICD-10-CM

## 2020-08-11 DIAGNOSIS — G40.219 PARTIAL EPILEPSY WITH IMPAIRMENT OF CONSCIOUSNESS, INTRACTABLE (HCC): Primary | ICD-10-CM

## 2020-08-11 DIAGNOSIS — E78.00 PURE HYPERCHOLESTEROLEMIA: ICD-10-CM

## 2020-08-11 DIAGNOSIS — E11.42 DIABETIC PERIPHERAL NEUROPATHY ASSOCIATED WITH TYPE 2 DIABETES MELLITUS (HCC): ICD-10-CM

## 2020-08-11 DIAGNOSIS — G40.219 PARTIAL EPILEPSY WITH IMPAIRMENT OF CONSCIOUSNESS, INTRACTABLE (HCC): ICD-10-CM

## 2020-08-11 PROCEDURE — G8417 CALC BMI ABV UP PARAM F/U: HCPCS | Performed by: FAMILY MEDICINE

## 2020-08-11 PROCEDURE — 3017F COLORECTAL CA SCREEN DOC REV: CPT | Performed by: FAMILY MEDICINE

## 2020-08-11 PROCEDURE — 3046F HEMOGLOBIN A1C LEVEL >9.0%: CPT | Performed by: FAMILY MEDICINE

## 2020-08-11 PROCEDURE — 99214 OFFICE O/P EST MOD 30 MIN: CPT | Performed by: FAMILY MEDICINE

## 2020-08-11 PROCEDURE — 1101F PT FALLS ASSESS-DOCD LE1/YR: CPT | Performed by: FAMILY MEDICINE

## 2020-08-11 PROCEDURE — G0438 PPPS, INITIAL VISIT: HCPCS | Performed by: FAMILY MEDICINE

## 2020-08-11 PROCEDURE — G8536 NO DOC ELDER MAL SCRN: HCPCS | Performed by: FAMILY MEDICINE

## 2020-08-11 PROCEDURE — G8400 PT W/DXA NO RESULTS DOC: HCPCS | Performed by: FAMILY MEDICINE

## 2020-08-11 PROCEDURE — G0444 DEPRESSION SCREEN ANNUAL: HCPCS | Performed by: FAMILY MEDICINE

## 2020-08-11 PROCEDURE — 2022F DILAT RTA XM EVC RTNOPTHY: CPT | Performed by: FAMILY MEDICINE

## 2020-08-11 PROCEDURE — G8427 DOCREV CUR MEDS BY ELIG CLIN: HCPCS | Performed by: FAMILY MEDICINE

## 2020-08-11 PROCEDURE — G8510 SCR DEP NEG, NO PLAN REQD: HCPCS | Performed by: FAMILY MEDICINE

## 2020-08-11 PROCEDURE — 1090F PRES/ABSN URINE INCON ASSESS: CPT | Performed by: FAMILY MEDICINE

## 2020-08-11 RX ORDER — INSULIN GLARGINE 100 [IU]/ML
80 INJECTION, SOLUTION SUBCUTANEOUS DAILY
Qty: 24 ML | Refills: 3 | Status: SHIPPED | OUTPATIENT
Start: 2020-08-11 | End: 2020-12-23 | Stop reason: SDUPTHER

## 2020-08-11 NOTE — TELEPHONE ENCOUNTER
Patient notified of add on depakote level. She states next week is her appt with neuro. They usually need the results as well. It will be in the system. Patient verbalizes understanding.

## 2020-08-11 NOTE — TELEPHONE ENCOUNTER
Patient would like  [facility] to add a depokote level to labs already ordered due to being on depakote for seizures.

## 2020-08-11 NOTE — PROGRESS NOTES
Patient is here today for their Medicare complete physical examination. He suffers from diabetes which last A1c was in the 11 range back in November of last year. Patient is not compliant with diet or medications. Patient also to check blood pressure and cholesterol as well. Patient admittedly is noncompliant with diet and medications. Consent: Delta Rodriguez, who was seen by synchronous (real-time) audio-video technology, and/or her healthcare decision maker, is aware that this patient-initiated, Telehealth encounter on 8/11/2020 is a billable service, with coverage as determined by her insurance carrier. She is aware that she may receive a bill and has provided verbal consent to proceed: Yes. Sofya [place] is completed and assessed=yes  Depression Screen is completed and assessed=yes  Medication list reviewed and adjusted for accuracy=yes  Immunizations reviewed and updated=yes  Health/Preventative Screenings reviewed and updated=yes  ADL Functions reviewed=yes    712  Subjective:   Delta Rodriguez is a 76 y.o. female who was seen for No chief complaint on file. Prior to Admission medications    Medication Sig Start Date End Date Taking? Authorizing Provider   insulin glargine (Lantus Solostar U-100 Insulin) 100 unit/mL (3 mL) inpn 80 Units by SubCUTAneous route daily. 8/11/20 5/24/22 Yes Cookie Clemons MD   metFORMIN ER (GLUCOPHAGE XR) 500 mg tablet Titrate gradually up until taking 4 tablets daily 2/12/20  Yes Cookie Clemons MD   dulaglutide (TRULICITY) 1.5 RU/5.8 mL sub-q pen 0.5 mL by SubCUTAneous route every seven (7) days. 11/20/19  Yes Cookie Clemons MD   insulin glargine (Lantus Solostar U-100 Insulin) 100 unit/mL (3 mL) inpn 80 Units by SubCUTAneous route daily. 7/29/20 8/11/20  Salenabonnie Garciaze, ERWIN   levocetirizine (XYZAL) 5 mg tablet Take 1 Tab by mouth daily.  3/11/20   Cookie Clemons MD   azithromycin (ZITHROMAX) 250 mg tablet Take two tablets today then one tablet daily 3/11/20 Yanira Whitaker MD   gabapentin (NEURONTIN) 300 mg capsule Take 1 Cap by mouth nightly. 3/11/20   Yanira Whitaker MD   butalbital-acetaminophen-caffeine (FIORICET, ESGIC) -40 mg per tablet Take 1-2 tablets by mouth every 8 hours PRN for headache. 2/27/20   Maikol Varma NP   cyclobenzaprine (FLEXERIL) 10 mg tablet Take 1 Tab by mouth two (2) times daily as needed for Muscle Spasm(s). 2/12/20   Yanira Whitaker MD   divalproex DR (DEPAKOTE) 250 mg tablet Take 3 tablets by mouth in the AM and 3 tablets by mouth QHS 2/12/20   Yanira Whitaker MD   cholecalciferol (VITAMIN D3) (1000 Units /25 mcg) tablet Take 5 Tabs by mouth daily. 2/12/20   Yanira Whitaker MD   Blood-Glucose Meter (FREESTYLE LITE METER) monitoring kit Check sugars bid. 7/9/19   Yanira Whitaker MD   rosuvastatin (CRESTOR) 40 mg tablet Take 1 Tab by mouth nightly. 6/25/19   Yanira Whitaker MD   acetaminophen-caffeine 500-65 mg (EXCEDRINE TENSION HEADACHE) 500-65 mg tab Take 1 Tab by mouth every eight (8) hours as needed for Headache. 6/19/19   Maikol Varma NP   Insulin Needles, Disposable, 31 gauge x 5/16\" ndle Using with Lantus and Trulicity, 8 per week, Dx: E11.65 2/22/19   Terry Sheikh NP   omeprazole (PRILOSEC) 20 mg capsule Take 1 Cap by mouth daily. 2/6/19   Terry Sheikh NP   Blood-Glucose Meter monitoring kit Testing BID, dx: E11.65 2/6/19   Terry Sheikh NP   glucose blood VI test strips (ASCENSIA AUTODISC VI, ONE TOUCH ULTRA TEST VI) strip Testing BID, dx: E11.65 2/6/19   Terry Sheikh NP   lancets misc Testing BID, dx: E11.65 2/6/19   Terry Sheikh NP   naloxone [facility]) 4 mg/actuation nasal spray Use 1 spray intranasally, then discard. Repeat with new spray every 2 min as needed for opioid overdose symptoms, alternating nostrils. 2/6/19   Terry Sheikh, NP   celecoxib (CELEBREX) 200 mg capsule Take  by mouth two (2) times a day. Provider, Historical   PARoxetine (PAXIL) 20 mg tablet Take  by mouth daily.     Provider, Historical   ALPRAZOLAM (XANAX PO) Take  by mouth. Take as needed. Provider, Historical   diphenhydrAMINE (BENADRYL ALLERGY) 25 mg tablet Take 25 mg by mouth every six (6) hours as needed. Provider, Historical     Allergies   Allergen Reactions    Flexall [Menthol-Aloe Vera Extract] Swelling    Sudafed [Pseudoephedrine Hcl] Other (comments)     Jittery      Patient Active Problem List    Diagnosis    Uncontrolled type 2 diabetes mellitus with hyperglycemia ([facility]    Legal blindness, as defined in [place]    Severe obesity (BMI 35.0-39. 9)    Type II or unspecified type diabetes mellitus without mention of complication, uncontrolled    Hyperlipidemia    Diabetic peripheral neuropathy associated with type 2 diabetes mellitus (HCC)    Post concussive syndrome    Partial epilepsy with impairment of consciousness, intractable (HCC)    Intractable migraine without aura    Headache(784.0)     Patient Active Problem List   Diagnosis Code    Partial epilepsy with impairment of consciousness, intractable ([facility] G40.219    Intractable migraine without aura G43.019    Headache(784.0) R51    Post concussive syndrome F07.81    Type II or unspecified type diabetes mellitus without mention of complication, uncontrolled [id number]    Hyperlipidemia E78.5    Diabetic peripheral neuropathy associated with type 2 diabetes mellitus ([facility] E11.42    Severe obesity (BMI 35.0-39. 9) E66.01    Legal blindness, as defined in [place] H54.8    Uncontrolled type 2 diabetes mellitus with hyperglycemia ([facility] E11.65       ROS    Objective:   Vital Signs: (As obtained by patient/caregiver at home)  There were no vitals taken for this visit.      [INSTRUCTIONS:  \"[x]\" Indicates a positive item  \"[]\" Indicates a negative item  -- DELETE ALL ITEMS NOT EXAMINED]    Constitutional: [x] Appears well-developed and well-nourished [x] No apparent distress      [] Abnormal -     Mental status: [x] Alert and awake  [x] Oriented to person/place/time [x] Able to follow commands    [] Abnormal -     Eyes:   EOM    [x]  Normal    [] Abnormal -   Sclera  [x]  Normal    [] Abnormal -          Discharge [x]  None visible   [] Abnormal -     HENT: [x] Normocephalic, atraumatic  [] Abnormal -   [x] Mouth/Throat: Mucous membranes are moist    External Ears [x] Normal  [] Abnormal -    Neck: [x] No visualized mass [] Abnormal -     Pulmonary/Chest: [x] Respiratory effort normal   [x] No visualized signs of difficulty breathing or respiratory distress        [] Abnormal -        Neurological:        [x] No Facial Asymmetry (Cranial nerve 7 motor function) (limited exam due to video visit)          [x] No gaze palsy        [] Abnormal -          Skin:        [x] No significant exanthematous lesions or discoloration noted on facial skin         [] Abnormal -            Psychiatric:       [x] Normal Affect [] Abnormal -        [x] No Hallucinations    Other pertinent observable physical exam findings:-              Assessment & Plan:   Diagnoses and all orders for this visit:    1. Routine general medical examination at a health care facility  -see below  -needs LW    2. Uncontrolled type 2 diabetes mellitus with hyperglycemia (HCC)  -     LIPID PANEL; Future  -     METABOLIC PANEL, COMPREHENSIVE; Future  -     HEMOGLOBIN A1C WITH EAG; Future  -     insulin glargine (Lantus Solostar U-100 Insulin) 100 unit/mL (3 mL) inpn; 80 Units by SubCUTAneous route daily.  -Patient is essentially noncompliant with medications and diet. Discussed with patient the importance of eating 3 meals a day also discussed with patient how to taper her insulin to her current sugar levels. Patient will make better efforts in the near future. Last A1c was in poor control. 3. Severe obesity with body mass index (BMI) of 35.0 to 39.9 with serious comorbidity ([address]    4.  Diabetic peripheral neuropathy associated with type 2 diabetes mellitus ([address]  - LIPID PANEL; Future  -     METABOLIC PANEL, COMPREHENSIVE; Future  -     HEMOGLOBIN A1C WITH EAG; Future  -     insulin glargine (Lantus Solostar U-100 Insulin) 100 unit/mL (3 mL) inpn; 80 Units by SubCUTAneous route daily. 5. Partial epilepsy with impairment of consciousness, intractable (HCC)  -Stable  6. Pure hypercholesterolemia  -     LIPID PANEL; Future  -     METABOLIC PANEL, COMPREHENSIVE; Future        Pain evaluation performed   -Cognitive Screen performed  -Depression Screen, Fall risks (by up and go test)  and ADL functionality were addressed  -Medication list updated and reviewed for any changes   -A comprehensive review of medical issues and a plan was formulated  -End of life planning was addressed with pt   -Health Screenings for preventions were addressed and a plan was formulated  -Shingles Vaccine was recommended  -Discussed with patient cancer risk factors and appropriate screenings for age  -Patient evaluated for colonoscopy and referred if needed per screeing criteria  -Labs from previous visits were discussed with patient   -Discussed with patient diet and exercise and formulated a plan as needed  -An Advanced care plan was developed with the patient.  -Alcohol screening performed and was negative              We discussed the expected course, resolution and complications of the diagnosis(es) in detail. Medication risks, benefits, costs, interactions, and alternatives were discussed as indicated. I advised her to contact the office if her condition worsens, changes or fails to improve as anticipated. She expressed understanding with the diagnosis(es) and plan. Kaylie Olsen is a 76 y.o. female being evaluated by a video visit encounter for concerns as above. A caregiver was present when appropriate.  Due to this being a TeleHealth encounter (During [facility]- public health emergency), evaluation of the following organ systems was limited: Vitals/Constitutional/EENT/Resp/CV/GI//MS/Neuro/Skin/Heme-Lymph-Imm. Pursuant to the emergency declaration under the [address] [place] [address], [facility] waiver authority and the [employer] and [employer] Act, this Virtual  Visit was conducted, with patient's (and/or legal guardian's) consent, to reduce the patient's risk of exposure to COVID-19 and provide necessary medical care. Services were provided through a video synchronous discussion virtually to substitute for in-person clinic visit. Patient and provider were located at their individual homes.         Tisha Mitchell MD

## 2020-08-13 ENCOUNTER — HOSPITAL ENCOUNTER (OUTPATIENT)
Dept: LAB | Age: 65
Discharge: HOME | End: 2020-08-13

## 2020-08-13 DIAGNOSIS — E78.00 PURE HYPERCHOLESTEROLEMIA: ICD-10-CM

## 2020-08-13 DIAGNOSIS — E11.65 UNCONTROLLED TYPE 2 DIABETES MELLITUS WITH HYPERGLYCEMIA (HCC): ICD-10-CM

## 2020-08-13 DIAGNOSIS — G40.219 PARTIAL EPILEPSY WITH IMPAIRMENT OF CONSCIOUSNESS, INTRACTABLE (HCC): ICD-10-CM

## 2020-08-13 DIAGNOSIS — E11.42 DIABETIC PERIPHERAL NEUROPATHY ASSOCIATED WITH TYPE 2 DIABETES MELLITUS (HCC): ICD-10-CM

## 2020-08-13 LAB
ALBUMIN SERPL-MCNC: 3.2 G/DL (ref 3.5–5)
ALBUMIN/GLOB SERPL: 1.1 {RATIO} (ref 1.1–2.2)
ALP SERPL-CCNC: 95 U/L (ref 45–117)
ALT SERPL-CCNC: 14 U/L (ref 12–78)
ANION GAP SERPL CALC-SCNC: 6 MMOL/L (ref 5–15)
AST SERPL-CCNC: 11 U/L (ref 15–37)
BILIRUB SERPL-MCNC: 0.2 MG/DL (ref 0.2–1)
BUN SERPL-MCNC: 13 MG/DL (ref 6–20)
BUN/CREAT SERPL: 21 (ref 12–20)
CALCIUM SERPL-MCNC: 8.9 MG/DL (ref 8.5–10.1)
CHLORIDE SERPL-SCNC: 102 MMOL/L (ref 97–108)
CHOLEST SERPL-MCNC: 176 MG/DL
CO2 SERPL-SCNC: 30 MMOL/L (ref 21–32)
CREAT SERPL-MCNC: 0.61 MG/DL (ref 0.55–1.02)
EST. AVERAGE GLUCOSE BLD GHB EST-MCNC: 243 MG/DL
GLOBULIN SER CALC-MCNC: 3 G/DL (ref 2–4)
GLUCOSE SERPL-MCNC: 150 MG/DL (ref 65–100)
HBA1C MFR BLD: 10.1 % (ref 4–5.6)
HDLC SERPL-MCNC: 48 MG/DL
HDLC SERPL: 3.7 {RATIO} (ref 0–5)
LDLC SERPL CALC-MCNC: 92.2 MG/DL (ref 0–100)
LIPID PROFILE,FLP: ABNORMAL
POTASSIUM SERPL-SCNC: 3.5 MMOL/L (ref 3.5–5.1)
PROT SERPL-MCNC: 6.2 G/DL (ref 6.4–8.2)
SODIUM SERPL-SCNC: 138 MMOL/L (ref 136–145)
TRIGL SERPL-MCNC: 179 MG/DL (ref ?–150)
VALPROATE SERPL-MCNC: 101 UG/ML (ref 50–100)
VLDLC SERPL CALC-MCNC: 35.8 MG/DL

## 2020-08-17 NOTE — PROGRESS NOTES
Called and spoke to patient's spouse. Analia Jett) Spouse states understanding of lab results per Dr Divya Meredith: Patient's lab work shows she is continuing poor control of her diabetes. Discussed with spouse: better diet control and taking her medications on a daily basis. Patient's spouse states understanding that she needs to follow a stricter diet. In addition, discussed with patient's spouse how to adjust her insulin, (increase by 10 units daily to 90 units for 3 days) to goal fasting blood sugar of 150 on average. Spouse states understanding to have patient return for labs in 3 months.

## 2020-08-17 NOTE — PROGRESS NOTES
Patient's lab work shows he is continuing poor control of his diabetes. Please discuss with patient compliance #1 with following up #2 with better diet control and #3 with taking his medications on a daily basis. Patient needs to follow a stricter diet in addition discussed with patient how to taper his insulin to goal fasting blood sugar of 150 on average. Please have the patient see me back in 3 months.   Dr. Delia Weber

## 2020-09-16 ENCOUNTER — VIRTUAL VISIT (OUTPATIENT)
Dept: FAMILY MEDICINE CLINIC | Age: 65
End: 2020-09-16
Payer: MEDICARE

## 2020-09-16 DIAGNOSIS — M62.838 MUSCLE SPASM: ICD-10-CM

## 2020-09-16 DIAGNOSIS — G43.719 INTRACTABLE CHRONIC MIGRAINE WITHOUT AURA AND WITHOUT STATUS MIGRAINOSUS: ICD-10-CM

## 2020-09-16 DIAGNOSIS — Z20.822 EXPOSURE TO COVID-19 VIRUS: Primary | ICD-10-CM

## 2020-09-16 DIAGNOSIS — G40.219 PARTIAL EPILEPSY WITH IMPAIRMENT OF CONSCIOUSNESS, INTRACTABLE (HCC): ICD-10-CM

## 2020-09-16 DIAGNOSIS — G43.809 CERVICOGENIC MIGRAINE: ICD-10-CM

## 2020-09-16 DIAGNOSIS — G44.209 ACUTE NON INTRACTABLE TENSION-TYPE HEADACHE: ICD-10-CM

## 2020-09-16 PROCEDURE — 99213 OFFICE O/P EST LOW 20 MIN: CPT | Performed by: NURSE PRACTITIONER

## 2020-09-16 RX ORDER — GABAPENTIN 300 MG/1
CAPSULE ORAL
Qty: 30 CAP | Refills: 2 | Status: SHIPPED | OUTPATIENT
Start: 2020-09-16 | End: 2021-03-25 | Stop reason: SDUPTHER

## 2020-09-16 RX ORDER — CYCLOBENZAPRINE HCL 10 MG
10 TABLET ORAL
Qty: 180 TAB | Refills: 1 | Status: SHIPPED | OUTPATIENT
Start: 2020-09-16 | End: 2021-03-25 | Stop reason: SDUPTHER

## 2020-09-16 RX ORDER — CYCLOBENZAPRINE HCL 10 MG
10 TABLET ORAL
Qty: 180 TAB | Refills: 1 | Status: CANCELLED | OUTPATIENT
Start: 2020-09-16

## 2020-09-16 NOTE — TELEPHONE ENCOUNTER
Patient would like cyclobenzaprine sent to [facility] but wants gabapentin sent to [facility].

## 2020-09-16 NOTE — PROGRESS NOTES
Consent: Natalee Casas, who was seen by synchronous (real-time) audio-video technology, and/or her healthcare decision maker, is aware that this patient-initiated, Telehealth encounter on 9/16/2020 is a billable service, with coverage as determined by her insurance carrier. She is aware that she may receive a bill and has provided verbal consent to proceed: Yes. 712      Subjective:   Natalee Casas is a 76 y.o. female who was seen for Concern For COVID-19 (Coronavirus)  Pt states she was in contact w/ someone who was diagnosed w/ COVID last week. States it was her old roommate who was moving out of her house, she hugged this person and they were later diagnosed w/ COVID and had to be hospitalized d/t symptoms. Pt states she took her temp and it was normal however she has been having \"non-stop headache\" treating w/ fioricet and states it helps but HA returns when medication wears off. Pt denies cough, SOB, nasal congestion, N/V/D. Reports that her  is a disabled vet and has a cough now, however he isn't a patient of the practice    Prior to Admission medications    Medication Sig Start Date End Date Taking? Authorizing Provider   insulin glargine (Lantus Solostar U-100 Insulin) 100 unit/mL (3 mL) inpn 80 Units by SubCUTAneous route daily. 8/11/20 5/24/22 Yes Carlene Sandoval MD   levocetirizine (XYZAL) 5 mg tablet Take 1 Tab by mouth daily. 3/11/20  Yes Carlene Sandoval MD   [facility]) 250 mg tablet Take two tablets today then one tablet daily 3/11/20  Yes Carlene Sandoval MD   gabapentin (NEURONTIN) 300 mg capsule Take 1 Cap by mouth nightly. 3/11/20  Yes Carlene Sandoval MD   butalbital-acetaminophen-caffeine (FIORICET, ESGIC) -40 mg per tablet Take 1-2 tablets by mouth every 8 hours PRN for headache. 2/27/20  Yes Good Varma NP   cyclobenzaprine (FLEXERIL) 10 mg tablet Take 1 Tab by mouth two (2) times daily as needed for Muscle Spasm(s).  2/12/20  Yes Carlene Sandoval MD divalproex DR (DEPAKOTE) 250 mg tablet Take 3 tablets by mouth in the AM and 3 tablets by mouth QHS 2/12/20  Yes Rohit Camacho MD   metFORMIN ER (GLUCOPHAGE XR) 500 mg tablet Titrate gradually up until taking 4 tablets daily 2/12/20  Yes Rohit Camacho MD   cholecalciferol (VITAMIN D3) (1000 Units /25 mcg) tablet Take 5 Tabs by mouth daily. 2/12/20  Yes Rohit Camacho MD   dulaglutide (TRULICITY) 1.5 GI/0.9 mL sub-q pen 0.5 mL by SubCUTAneous route every seven (7) days. 11/20/19  Yes Rohit Camacho MD   Blood-Glucose Meter (FREESTYLE LITE METER) monitoring kit Check sugars bid. 7/9/19  Yes Rohit Camacho MD   rosuvastatin (CRESTOR) 40 mg tablet Take 1 Tab by mouth nightly. 6/25/19  Yes Rohit Camacho MD   acetaminophen-caffeine 500-65 mg (EXCEDRINE TENSION HEADACHE) 500-65 mg tab Take 1 Tab by mouth every eight (8) hours as needed for Headache. 6/19/19  Yes Quinn Varma NP   Insulin Needles, Disposable, 31 gauge x 5/16\" ndle Using with Lantus and Trulicity, 8 per week, Dx: E11.65 2/22/19  Yes Zoya Leong NP   omeprazole (PRILOSEC) 20 mg capsule Take 1 Cap by mouth daily. 2/6/19  Yes Zoya Leong NP   Blood-Glucose Meter monitoring kit Testing BID, dx: E11.65 2/6/19  Yes Zoya Leong NP   glucose blood VI test strips (ASCENSIA AUTODISC VI, ONE TOUCH ULTRA TEST VI) strip Testing BID, dx: E11.65 2/6/19  Yes Zoya Leong NP   lancets misc Testing BID, dx: E11.65 2/6/19  Yes Zoya Leong NP   naloxone [facility]) 4 mg/actuation nasal spray Use 1 spray intranasally, then discard. Repeat with new spray every 2 min as needed for opioid overdose symptoms, alternating nostrils. 2/6/19  Yes Zoya Leong NP   celecoxib (CELEBREX) 200 mg capsule Take  by mouth two (2) times a day. Yes Provider, Historical   PARoxetine (PAXIL) 20 mg tablet Take  by mouth daily. Yes Provider, Historical   ALPRAZOLAM (XANAX PO) Take  by mouth. Take as needed.    Yes Provider, Historical   diphenhydrAMINE (BENADRYL ALLERGY) 25 mg tablet Take 25 mg by mouth every six (6) hours as needed. Yes Provider, Historical     Allergies   Allergen Reactions    Flexall [Menthol-Aloe Vera Extract] Swelling    Sudafed [Pseudoephedrine Hcl] Other (comments)     Jittery        Patient Active Problem List    Diagnosis Date Noted    Uncontrolled type 2 diabetes mellitus with hyperglycemia ([facility] 08/15/2019    Legal blindness, as defined in [place] 10/24/2018    Severe obesity (BMI 35.0-39.9) 08/13/2018    Type II or unspecified type diabetes mellitus without mention of complication, uncontrolled     Hyperlipidemia     Diabetic peripheral neuropathy associated with type 2 diabetes mellitus ([address]     Post concussive syndrome 10/17/2011    Partial epilepsy with impairment of consciousness, intractable ([facility] 04/13/2011    Intractable migraine without aura 04/13/2011    Headache(784.0) 04/13/2011       ROS - negative except as listed above in the HPI      Objective:   Vital Signs: (As obtained by patient/caregiver at home)  There were no vitals taken for this visit. Physical Exam:   General: alert, cooperative, no distress   Mental  status: normal mood, behavior, speech, dress, motor activity, and thought processes, able to follow commands   HEENT: normocephalic, atraumatic    Eyes:  extraocular movements intact, sclera normal, no visible discharge   Ears:  external ears normal   Mouth/Throat:  mucous memrates appear moist    Neck: no visualized mass   Resp: respiratory effort is normal, breathing appears non-labored, no visualized signs of respiratory distress   Neuro: no gross deficits   Skin: no discoloration or lesions of concern on visible areas   Psychiatric: normal affect, consistent with stated mood, no evidence of hallucinations      Additional exam findings:      Assessment & Plan:   Diagnoses and all orders for this visit:    1. Exposure to COVID-19 virus  -     NOVEL CORONAVIRUS (COVID-19);  Future  - Will notify patient of results when available and alter plan as needed    2. Acute non intractable tension-type headache  -     NOVEL CORONAVIRUS (COVID-19); Future  - Advised to stop frequent use of fioricet d/t potential for rebound HA, treat w/ tylenol extra strength and push fluids          Follow-up and Dispositions    · Return if symptoms worsen or fail to improve. I spent at least 15 minutes with this established patient, and >50% of the time was spent counseling and/or coordinating care regarding COVID exposure and [place]      We discussed the expected course, resolution and complications of the diagnosis(es) in detail. Medication risks, benefits, costs, interactions, and alternatives were discussed as indicated. I advised her to contact the office if her condition worsens, changes or fails to improve as anticipated. She expressed understanding with the diagnosis(es) and plan. Tiago Dickey is a 76 y.o. female being evaluated by a video visit encounter for concerns as above. A caregiver was present when appropriate. Due to this being a TeleHealth encounter (During [facility]-46 public health emergency), evaluation of the following organ systems was limited: Vitals/Constitutional/EENT/Resp/CV/GI//MS/Neuro/Skin/Heme-Lymph-Imm. Pursuant to the emergency declaration under the [facility] waiver authority and the [employer] and [employer] Act, this Virtual  Visit was conducted, with patient's (and/or legal guardian's) consent, to reduce the patient's risk of exposure to COVID-19 and provide necessary medical care. Services were provided through a video synchronous discussion virtually to substitute for in-person clinic visit. Patient and provider were located at their individual homes.         Candy Sharp NP

## 2020-09-17 ENCOUNTER — HOSPITAL ENCOUNTER (OUTPATIENT)
Dept: LAB | Age: 65
Discharge: HOME | End: 2020-09-17

## 2020-09-22 LAB — SARS-COV-2, COV2NT: NOT DETECTED

## 2020-09-23 NOTE — PROGRESS NOTES
Patient was contacted she verified her , I informed her that her covid-19 test came back negative. Patient verbalized understanding.

## 2020-10-27 ENCOUNTER — VIRTUAL VISIT (OUTPATIENT)
Dept: FAMILY MEDICINE CLINIC | Age: 65
End: 2020-10-27
Payer: MEDICARE

## 2020-10-27 DIAGNOSIS — G89.29 CHRONIC PAIN OF RIGHT KNEE: Primary | ICD-10-CM

## 2020-10-27 DIAGNOSIS — M25.561 CHRONIC PAIN OF RIGHT KNEE: Primary | ICD-10-CM

## 2020-10-27 DIAGNOSIS — E11.65 UNCONTROLLED TYPE 2 DIABETES MELLITUS WITH HYPERGLYCEMIA (HCC): ICD-10-CM

## 2020-10-27 PROCEDURE — G0463 HOSPITAL OUTPT CLINIC VISIT: HCPCS | Performed by: FAMILY MEDICINE

## 2020-10-27 PROCEDURE — G8510 SCR DEP NEG, NO PLAN REQD: HCPCS | Performed by: FAMILY MEDICINE

## 2020-10-27 PROCEDURE — G8427 DOCREV CUR MEDS BY ELIG CLIN: HCPCS | Performed by: FAMILY MEDICINE

## 2020-10-27 PROCEDURE — 99214 OFFICE O/P EST MOD 30 MIN: CPT | Performed by: FAMILY MEDICINE

## 2020-10-27 PROCEDURE — 2022F DILAT RTA XM EVC RTNOPTHY: CPT | Performed by: FAMILY MEDICINE

## 2020-10-27 PROCEDURE — 3017F COLORECTAL CA SCREEN DOC REV: CPT | Performed by: FAMILY MEDICINE

## 2020-10-27 PROCEDURE — G8400 PT W/DXA NO RESULTS DOC: HCPCS | Performed by: FAMILY MEDICINE

## 2020-10-27 PROCEDURE — 1101F PT FALLS ASSESS-DOCD LE1/YR: CPT | Performed by: FAMILY MEDICINE

## 2020-10-27 PROCEDURE — 3046F HEMOGLOBIN A1C LEVEL >9.0%: CPT | Performed by: FAMILY MEDICINE

## 2020-10-27 PROCEDURE — 1090F PRES/ABSN URINE INCON ASSESS: CPT | Performed by: FAMILY MEDICINE

## 2020-10-27 NOTE — PROGRESS NOTES
Patient is here today for 2 different concerns. First she has chronic knee pain which is having increasing issues recently over the last couple weeks. She see orthopedics in the past they done a couple procedures and told her she needs a right knee replacement at this time. In addition she is to follow-up today with her diabetes. She has not increased her insulin since our last office visit. Patient does not check sugars on the regular basis. Diet is poor. Consent: Patricia Evans, who was seen by synchronous (real-time) audio-video technology, and/or her healthcare decision maker, is aware that this patient-initiated, Telehealth encounter on 10/27/2020 is a billable service, with coverage as determined by her insurance carrier. She is aware that she may receive a bill and has provided verbal consent to proceed: YES-Consent obtained within past 12 months        712  Subjective:   Patricia Evasn is a 76 y.o. female who was seen for No chief complaint on file. Prior to Admission medications    Medication Sig Start Date End Date Taking? Authorizing Provider   gabapentin (NEURONTIN) 300 mg capsule TAKE ONE CAPSULE BY MOUTH AT NIGHT 9/16/20   Jon Stearns MD   cyclobenzaprine (FLEXERIL) 10 mg tablet Take 1 Tab by mouth two (2) times daily as needed for Muscle Spasm(s). 9/16/20   Jno Stearns MD   insulin glargine (Lantus Solostar U-100 Insulin) 100 unit/mL (3 mL) inpn 80 Units by SubCUTAneous route daily. 8/11/20 5/24/22  Jon Stearns MD   levocetirizine (XYZAL) 5 mg tablet Take 1 Tab by mouth daily.  3/11/20   Jon Stearns MD   azithromycin [facility]) 250 mg tablet Take two tablets today then one tablet daily 3/11/20   Jon Stearns MD   butalbital-acetaminophen-caffeine (FIORICET, [facility]) -40 mg per tablet Take 1-2 tablets by mouth every 8 hours PRN for headache. 2/27/20   Ann Varma NP   divalproex  (DEPAKOTE) 250 mg tablet Take 3 tablets by mouth in the AM and 3 tablets by mouth QHS 2/12/20   Orlando Muller MD   metFORMIN ER (GLUCOPHAGE XR) 500 mg tablet Titrate gradually up until taking 4 tablets daily 2/12/20   Orlando Muller MD   cholecalciferol (VITAMIN D3) (1000 Units /25 mcg) tablet Take 5 Tabs by mouth daily. 2/12/20   Orlando Muller MD   dulaglutide (TRULICITY) 1.5 AA/3.0 mL sub-q pen 0.5 mL by SubCUTAneous route every seven (7) days. 11/20/19   Orlando Muller MD   Blood-Glucose Meter (FREESTYLE LITE METER) monitoring kit Check sugars bid. 7/9/19   Orlando Muller MD   rosuvastatin (CRESTOR) 40 mg tablet Take 1 Tab by mouth nightly. 6/25/19   Orlando Muller MD   acetaminophen-caffeine 500-65 mg (EXCEDRINE TENSION HEADACHE) 500-65 mg tab Take 1 Tab by mouth every eight (8) hours as needed for Headache. 6/19/19   Obed Varma NP   Insulin Needles, Disposable, 31 gauge x 5/16\" ndle Using with Lantus and Trulicity, 8 per week, Dx: E11.65 2/22/19   Mily Foreman NP   omeprazole (PRILOSEC) 20 mg capsule Take 1 Cap by mouth daily. 2/6/19   Mily Foreman NP   Blood-Glucose Meter monitoring kit Testing BID, dx: E11.65 2/6/19   Mily Foreman NP   glucose blood VI test strips (ASCENSIA AUTODISC VI, ONE TOUCH ULTRA TEST VI) strip Testing BID, dx: E11.65 2/6/19   Mily Foreman NP   lancets misc Testing BID, dx: E11.65 2/6/19   Mily Foreman NP   naloxone [facility]) 4 mg/actuation nasal spray Use 1 spray intranasally, then discard. Repeat with new spray every 2 min as needed for opioid overdose symptoms, alternating nostrils. 2/6/19   Mily Foreman NP   celecoxib (CELEBREX) 200 mg capsule Take  by mouth two (2) times a day. Provider, Historical   PARoxetine (PAXIL) 20 mg tablet Take  by mouth daily. Provider, Historical   ALPRAZOLAM (XANAX PO) Take  by mouth. Take as needed. Provider, Historical   diphenhydrAMINE (BENADRYL ALLERGY) 25 mg tablet Take 25 mg by mouth every six (6) hours as needed.     Provider, Historical     Allergies   Allergen Reactions    Flexall [Menthol-Aloe Vera Extract] Swelling    Sudafed [Pseudoephedrine Hcl] Other (comments)     Jittery        ROS    Objective:   Vital Signs: (As obtained by patient/caregiver at home)  There were no vitals taken for this visit. [INSTRUCTIONS:  \"[x]\" Indicates a positive item  \"[]\" Indicates a negative item  -- DELETE ALL ITEMS NOT EXAMINED]    Constitutional: [x] Appears well-developed and well-nourished [x] No apparent distress      [] Abnormal -     Mental status: [x] Alert and awake  [x] Oriented to person/place/time [x] Able to follow commands    [] Abnormal -     Eyes:   EOM    [x]  Normal    [] Abnormal -   Sclera  [x]  Normal    [] Abnormal -          Discharge [x]  None visible   [] Abnormal -     HENT: [x] Normocephalic, atraumatic  [] Abnormal -   [x] Mouth/Throat: Mucous membranes are moist    External Ears [x] Normal  [] Abnormal -    Neck: [x] No visualized mass [] Abnormal -     Pulmonary/Chest: [x] Respiratory effort normal   [x] No visualized signs of difficulty breathing or respiratory distress        [] Abnormal -        Neurological:        [x] No Facial Asymmetry (Cranial nerve 7 motor function) (limited exam due to video visit)          [x] No gaze palsy        [] Abnormal -          Skin:        [x] No significant exanthematous lesions or discoloration noted on facial skin         [] Abnormal -            Psychiatric:       [x] Normal Affect [] Abnormal -        [x] No Hallucinations    Other pertinent observable physical exam findings:-              Assessment & Plan:   Diagnoses and all orders for this visit:    1. Chronic pain of right knee  -Patient having increasing symptoms of right knee pain, refer patient back to orthopedics for consideration of knee replacement as per previous plan. -Discussed with patient that she needs get her A1c control in order for the orthopedic surgeon to do surgery.     2. Uncontrolled type 2 diabetes mellitus with hyperglycemia ([address]  -  Lab Results Component Value Date/Time    Hemoglobin A1c 10.1 (H) 08/13/2020 04:30 PM    Hemoglobin A1c (POC) 6.5 05/28/2014 02:09 PM     -Poor control of diabetes  -Patient has not taper her insulin and not checking her sugars per our last office visit and discussion.  -Discussed with patient how to taper her insulin. We will increase her from 80 to 90 units today, and then increase 5 units every 3 days until her average fasting sugar is 150. We discussed the expected course, resolution and complications of the diagnosis(es) in detail. Medication risks, benefits, costs, interactions, and alternatives were discussed as indicated. I advised her to contact the office if her condition worsens, changes or fails to improve as anticipated. She expressed understanding with the diagnosis(es) and plan. Jeremy Everett is a 76 y.o. female being evaluated by a video visit encounter for concerns as above. A caregiver was present when appropriate. Due to this being a TeleHealth encounter (During [facility] public [facility] emergency), evaluation of the following organ systems was limited: Vitals/Constitutional/EENT/Resp/CV/GI//MS/Neuro/Skin/Heme-Lymph-Imm. Pursuant to the emergency declaration under the [facility] waiver authority and the [employer] Act, this Virtual  Visit was conducted, with patient's (and/or legal guardian's) consent, to reduce the patient's risk of exposure to COVID-19 and provide necessary medical care. Services were provided through a video synchronous discussion virtually to substitute for in-person clinic visit. Patient and provider were located at their individual homes.         Binh Boyer MD

## 2020-11-02 ENCOUNTER — DOCUMENTATION ONLY (OUTPATIENT)
Dept: FAMILY MEDICINE CLINIC | Age: 65
End: 2020-11-02

## 2020-11-11 ENCOUNTER — TELEPHONE (OUTPATIENT)
Dept: FAMILY MEDICINE CLINIC | Age: 65
End: 2020-11-11

## 2020-11-11 NOTE — TELEPHONE ENCOUNTER
[employer] Rx request was signed & faxed to [phone],PQ,FHIF placed in scan folder to be scanned to chart.

## 2020-11-23 ENCOUNTER — DOCUMENTATION ONLY (OUTPATIENT)
Dept: FAMILY MEDICINE CLINIC | Age: 65
End: 2020-11-23

## 2020-11-23 NOTE — PROGRESS NOTES
[address] for pneumatic compression devices request was put on [facility] desk to process

## 2020-12-01 ENCOUNTER — TELEPHONE (OUTPATIENT)
Dept: FAMILY MEDICINE CLINIC | Age: 65
End: 2020-12-01

## 2020-12-01 NOTE — TELEPHONE ENCOUNTER
[address] for pneumatic compression devices request & last 2 office notes were faxed to [phone],ok,Copy placed in scan folder to be scanned to chart.

## 2020-12-23 DIAGNOSIS — E11.42 DIABETIC PERIPHERAL NEUROPATHY ASSOCIATED WITH TYPE 2 DIABETES MELLITUS (HCC): ICD-10-CM

## 2020-12-23 DIAGNOSIS — E11.65 UNCONTROLLED TYPE 2 DIABETES MELLITUS WITH HYPERGLYCEMIA (HCC): ICD-10-CM

## 2020-12-23 DIAGNOSIS — E78.00 PURE HYPERCHOLESTEROLEMIA: ICD-10-CM

## 2020-12-24 RX ORDER — DULAGLUTIDE 1.5 MG/.5ML
1.5 INJECTION, SOLUTION SUBCUTANEOUS
Qty: 12 SYRINGE | Refills: 1 | Status: SHIPPED | OUTPATIENT
Start: 2020-12-24 | End: 2021-01-04 | Stop reason: SDUPTHER

## 2020-12-24 RX ORDER — ROSUVASTATIN CALCIUM 40 MG/1
40 TABLET, COATED ORAL
Qty: 90 TAB | Refills: 1 | Status: SHIPPED | OUTPATIENT
Start: 2020-12-24 | End: 2021-01-04 | Stop reason: SDUPTHER

## 2020-12-24 RX ORDER — INSULIN GLARGINE 100 [IU]/ML
80 INJECTION, SOLUTION SUBCUTANEOUS DAILY
Qty: 24 ML | Refills: 3 | Status: SHIPPED | OUTPATIENT
Start: 2020-12-24 | End: 2021-01-04 | Stop reason: SDUPTHER

## 2021-01-04 DIAGNOSIS — E11.42 DIABETIC PERIPHERAL NEUROPATHY ASSOCIATED WITH TYPE 2 DIABETES MELLITUS (HCC): ICD-10-CM

## 2021-01-04 DIAGNOSIS — E78.00 PURE HYPERCHOLESTEROLEMIA: ICD-10-CM

## 2021-01-04 DIAGNOSIS — E11.65 UNCONTROLLED TYPE 2 DIABETES MELLITUS WITH HYPERGLYCEMIA (HCC): ICD-10-CM

## 2021-01-04 DIAGNOSIS — G40.219 PARTIAL EPILEPSY WITH IMPAIRMENT OF CONSCIOUSNESS, INTRACTABLE (HCC): ICD-10-CM

## 2021-01-04 RX ORDER — INSULIN GLARGINE 100 [IU]/ML
80 INJECTION, SOLUTION SUBCUTANEOUS DAILY
Qty: 24 ML | Refills: 3 | Status: SHIPPED | OUTPATIENT
Start: 2021-01-04 | End: 2021-03-24 | Stop reason: SDUPTHER

## 2021-01-04 RX ORDER — ROSUVASTATIN CALCIUM 40 MG/1
40 TABLET, COATED ORAL
Qty: 90 TAB | Refills: 1 | Status: SHIPPED | OUTPATIENT
Start: 2021-01-04 | End: 2021-03-25 | Stop reason: SDUPTHER

## 2021-01-04 RX ORDER — METFORMIN HYDROCHLORIDE 500 MG/1
TABLET, EXTENDED RELEASE ORAL
Qty: 360 TAB | Refills: 1 | Status: SHIPPED | OUTPATIENT
Start: 2021-01-04 | End: 2021-03-24 | Stop reason: SDUPTHER

## 2021-01-04 RX ORDER — DULAGLUTIDE 1.5 MG/.5ML
1.5 INJECTION, SOLUTION SUBCUTANEOUS
Qty: 12 SYRINGE | Refills: 1 | Status: SHIPPED | OUTPATIENT
Start: 2021-01-04 | End: 2021-03-25 | Stop reason: SDUPTHER

## 2021-01-04 RX ORDER — GABAPENTIN 300 MG/1
CAPSULE ORAL
Qty: 30 CAP | Refills: 2 | Status: CANCELLED | OUTPATIENT
Start: 2021-01-04

## 2021-01-04 NOTE — TELEPHONE ENCOUNTER
Patient has run out of metformin & has made a fasting ov on 01/18/20. Patient also did not  lantus solostar, truliity or rosuvastatin & pharmacy put back & she was told that they need to be reordered. Can refill all of these to [facility]?

## 2021-01-19 ENCOUNTER — OFFICE VISIT (OUTPATIENT)
Dept: FAMILY MEDICINE CLINIC | Age: 66
End: 2021-01-19
Payer: MEDICARE

## 2021-01-19 VITALS
BODY MASS INDEX: 28.32 KG/M2 | OXYGEN SATURATION: 96 % | HEIGHT: 61 IN | RESPIRATION RATE: 18 BRPM | TEMPERATURE: 97.6 F | WEIGHT: 150 LBS | HEART RATE: 91 BPM | DIASTOLIC BLOOD PRESSURE: 82 MMHG | SYSTOLIC BLOOD PRESSURE: 124 MMHG

## 2021-01-19 DIAGNOSIS — E11.42 DIABETIC PERIPHERAL NEUROPATHY ASSOCIATED WITH TYPE 2 DIABETES MELLITUS (HCC): ICD-10-CM

## 2021-01-19 DIAGNOSIS — Z12.31 ENCOUNTER FOR SCREENING MAMMOGRAM FOR MALIGNANT NEOPLASM OF BREAST: ICD-10-CM

## 2021-01-19 DIAGNOSIS — G40.219 PARTIAL EPILEPSY WITH IMPAIRMENT OF CONSCIOUSNESS, INTRACTABLE (HCC): ICD-10-CM

## 2021-01-19 DIAGNOSIS — E11.65 UNCONTROLLED TYPE 2 DIABETES MELLITUS WITH HYPERGLYCEMIA (HCC): Primary | ICD-10-CM

## 2021-01-19 DIAGNOSIS — E78.00 PURE HYPERCHOLESTEROLEMIA: ICD-10-CM

## 2021-01-19 PROCEDURE — G0463 HOSPITAL OUTPT CLINIC VISIT: HCPCS | Performed by: FAMILY MEDICINE

## 2021-01-19 PROCEDURE — G9899 SCRN MAM PERF RSLTS DOC: HCPCS | Performed by: FAMILY MEDICINE

## 2021-01-19 PROCEDURE — 3046F HEMOGLOBIN A1C LEVEL >9.0%: CPT | Performed by: FAMILY MEDICINE

## 2021-01-19 PROCEDURE — G8417 CALC BMI ABV UP PARAM F/U: HCPCS | Performed by: FAMILY MEDICINE

## 2021-01-19 PROCEDURE — 3017F COLORECTAL CA SCREEN DOC REV: CPT | Performed by: FAMILY MEDICINE

## 2021-01-19 PROCEDURE — G8427 DOCREV CUR MEDS BY ELIG CLIN: HCPCS | Performed by: FAMILY MEDICINE

## 2021-01-19 PROCEDURE — 1101F PT FALLS ASSESS-DOCD LE1/YR: CPT | Performed by: FAMILY MEDICINE

## 2021-01-19 PROCEDURE — 99214 OFFICE O/P EST MOD 30 MIN: CPT | Performed by: FAMILY MEDICINE

## 2021-01-19 PROCEDURE — G8510 SCR DEP NEG, NO PLAN REQD: HCPCS | Performed by: FAMILY MEDICINE

## 2021-01-19 PROCEDURE — 2022F DILAT RTA XM EVC RTNOPTHY: CPT | Performed by: FAMILY MEDICINE

## 2021-01-19 PROCEDURE — G8536 NO DOC ELDER MAL SCRN: HCPCS | Performed by: FAMILY MEDICINE

## 2021-01-19 PROCEDURE — 1090F PRES/ABSN URINE INCON ASSESS: CPT | Performed by: FAMILY MEDICINE

## 2021-01-19 PROCEDURE — G8400 PT W/DXA NO RESULTS DOC: HCPCS | Performed by: FAMILY MEDICINE

## 2021-01-19 NOTE — PROGRESS NOTES
Chief Complaint   Patient presents with    Diabetes     REQUESTING FOOT EXAM    Labs     Fasting  today    Medication Refill    Shoulder Pain     Left shoulder: Dr Jignesh Stroud     1. Have you been to the ER, urgent care clinic since your last visit? Hospitalized since your last visit? No    2. Have you seen or consulted any other health care providers outside of the [address] [place] [address] since your last visit? Include any pap smears or colon screening. Estefania Otero  1/19/2021  Provider:   [place]:  Diabetes Report Card   1) Have you seen the eye doctor in past year?yes    2) How would you  rate your Diabetic Diet? Been bad   3) How well do you take care of your feet?    4) Do you keep your Primary Care Follow Up Appts? yes    5) Do you know your A1C goal?yes    6) Do you take your medications daily?no    7) Do you check your blood sugars? yes    8) Have you gained weight?no       9) Do you follow an exercise program?no    10) Can you do better?no      Lab Results   Component Value Date/Time    Cholesterol, total 176 08/13/2020 04:30 PM    HDL Cholesterol 48 08/13/2020 04:30 PM    LDL, calculated 92.2 08/13/2020 04:30 PM    Triglyceride 179 (H) 08/13/2020 04:30 PM    CHOL/HDL Ratio 3.7 08/13/2020 04:30 PM     Lab Results   Component Value Date/Time    Hemoglobin A1c 10.1 (H) 08/13/2020 04:30 PM    Hemoglobin A1c 11.3 (H) 11/14/2019 11:15 AM    Hemoglobin A1c 11.6 (H) 08/15/2019 11:44 AM    Glucose 150 (H) 08/13/2020 04:30 PM    Glucose (POC) 282 (H) 01/30/2019 02:57 PM    Microalbumin/Creat ratio (mg/g creat) 7 11/14/2019 11:16 AM    Microalbumin,urine random 0.88 11/14/2019 11:16 AM    LDL, calculated 92.2 08/13/2020 04:30 PM    Creatinine 0.61 08/13/2020 04:30 PM          Lab Results   Component Value Date/Time    Microalbumin/Creat ratio (mg/g creat) 7 11/14/2019 11:16 AM    Microalbumin,urine random 0.88 11/14/2019 11:16 AM      Chief Complaint   Patient presents with    Diabetes REQUESTING FOOT EXAM    Labs     Fasting  today    Medication Refill    Shoulder Pain     Left shoulder: Dr Marcella Aguirre     she is a 78y.o. year old female who presents for evaluation. See Diabetic Report Card listed above. Patient Active Problem List    Diagnosis    Uncontrolled type 2 diabetes mellitus with hyperglycemia ([address]    Legal blindness, as defined in [place]    Severe obesity (BMI 35.0-39. 9)    Type II or unspecified type diabetes mellitus without mention of complication, uncontrolled    Hyperlipidemia    Diabetic peripheral neuropathy associated with type 2 diabetes mellitus (HCC)    Post concussive syndrome    Partial epilepsy with impairment of consciousness, intractable (HCC)    Intractable migraine without aura    Headache(784.0)       Reviewed PmHx, RxHx, FmHx, SocHx, AllgHx--dated and updated in the chart. Review of Systems - negative except as listed above in the HPI    Objective:     Vitals:    01/19/21 1051   BP: 124/82   Pulse: 91   Resp: 18   Temp: 97.6 °F (36.4 °C)   TempSrc: Oral   SpO2: 96%   Weight: 150 lb (68 kg)   Height: 5' 1\" (1.549 m)     Physical Examination: General appearance - alert, well appearing, and in no distress  Chest - clear to auscultation, no wheezes, rales or rhonchi, symmetric air entry  Heart - normal rate, regular rhythm, normal S1, S2, no murmurs, rubs, clicks or gallops  Abdomen - soft, nontender, nondistended, no masses or organomegaly  Foot exam reveals mild toenail fungus    Assessment/ Plan:   Diagnoses and all orders for this visit:    1. Uncontrolled type 2 diabetes mellitus with hyperglycemia (HCC)  -     LIPID PANEL; Future  -     METABOLIC PANEL, COMPREHENSIVE; Future  -     HEMOGLOBIN A1C WITH EAG; Future  -     TSH 3RD GENERATION; Future  -     CBC WITH AUTOMATED DIFF;  Future  -     MICROALBUMIN, UR, RAND W/ MICROALB/CREAT RATIO; Future  -dwp poor control  -dwp to  Increase to 90 units and how to taper insulin  -refer to podiatry for toenail fungus  -ordered mammogram    2. Diabetic peripheral neuropathy associated with type 2 diabetes mellitus ([facility]  -     LIPID PANEL; Future  -     METABOLIC PANEL, COMPREHENSIVE; Future  -     HEMOGLOBIN A1C WITH EAG; Future  -     TSH 3RD GENERATION; Future  -     CBC WITH AUTOMATED DIFF; Future  -     MICROALBUMIN, UR, RAND W/ MICROALB/CREAT RATIO; Future  -see above   -nl eye exam    3. Pure hypercholesterolemia  -     LIPID PANEL; Future  -     METABOLIC PANEL, COMPREHENSIVE; Future    4. Partial epilepsy with impairment of consciousness, intractable (HCC)  -stable and no sxs         Lab Results   Component Value Date/Time    Cholesterol, total 176 08/13/2020 04:30 PM    HDL Cholesterol 48 08/13/2020 04:30 PM    LDL, calculated 92.2 08/13/2020 04:30 PM    Triglyceride 179 (H) 08/13/2020 04:30 PM    CHOL/HDL Ratio 3.7 08/13/2020 04:30 PM     Lab Results   Component Value Date/Time    Hemoglobin A1c 10.1 (H) 08/13/2020 04:30 PM    Hemoglobin A1c 11.3 (H) 11/14/2019 11:15 AM    Hemoglobin A1c 11.6 (H) 08/15/2019 11:44 AM    Microalbumin/Creat ratio (mg/g creat) 7 11/14/2019 11:16 AM    Microalbumin,urine random 0.88 11/14/2019 11:16 AM    LDL, calculated 92.2 08/13/2020 04:30 PM    Creatinine 0.61 08/13/2020 04:30 PM          Discussed with patient goal of Diabetes to include:  HgA1C <7, LDL cholesterol <100, Blood pressure <140/80. Discussed with patient diet and weight management and to get regular exercise. Recommend yearly eye exams and daily foot care. The patient understands and agrees with the plan. I have discussed the diagnosis with the patient and the intended plan as seen in the above orders. The patient has received an after-visit summary and questions were answered concerning future plans.      Medication Side Effects and Warnings were discussed with patient  Patient Labs were reviewed and or requested  Patient Past Records were reviewed and or requested    Staci Calix M.D.  [address] [facility]    There are no Patient Instructions on file for this visit.

## 2021-01-22 LAB
ALBUMIN SERPL-MCNC: 3.6 G/DL (ref 3.5–5)
ALBUMIN/GLOB SERPL: 1.3 {RATIO} (ref 1.1–2.2)
ALP SERPL-CCNC: 110 U/L (ref 45–117)
ALT SERPL-CCNC: 12 U/L (ref 12–78)
ANION GAP SERPL CALC-SCNC: 8 MMOL/L (ref 5–15)
AST SERPL-CCNC: 4 U/L (ref 15–37)
BASOPHILS # BLD: 0.1 K/UL (ref 0–0.1)
BASOPHILS NFR BLD: 1 % (ref 0–1)
BILIRUB SERPL-MCNC: 0.3 MG/DL (ref 0.2–1)
BUN SERPL-MCNC: 25 MG/DL (ref 6–20)
BUN/CREAT SERPL: 34 (ref 12–20)
CALCIUM SERPL-MCNC: 9.3 MG/DL (ref 8.5–10.1)
CHLORIDE SERPL-SCNC: 103 MMOL/L (ref 97–108)
CHOLEST SERPL-MCNC: 198 MG/DL
CO2 SERPL-SCNC: 27 MMOL/L (ref 21–32)
CREAT SERPL-MCNC: 0.73 MG/DL (ref 0.55–1.02)
DIFFERENTIAL METHOD BLD: ABNORMAL
EOSINOPHIL # BLD: 0.1 K/UL (ref 0–0.4)
EOSINOPHIL NFR BLD: 2 % (ref 0–7)
ERYTHROCYTE [DISTWIDTH] IN BLOOD BY AUTOMATED COUNT: 12.6 % (ref 11.5–14.5)
EST. AVERAGE GLUCOSE BLD GHB EST-MCNC: 272 MG/DL
GLOBULIN SER CALC-MCNC: 2.8 G/DL (ref 2–4)
GLUCOSE SERPL-MCNC: 233 MG/DL (ref 65–100)
HBA1C MFR BLD: 11.1 % (ref 4–5.6)
HCT VFR BLD AUTO: 42.3 % (ref 35–47)
HDLC SERPL-MCNC: 50 MG/DL
HDLC SERPL: 4 {RATIO} (ref 0–5)
HGB BLD-MCNC: 14.3 G/DL (ref 11.5–16)
IMM GRANULOCYTES # BLD AUTO: 0 K/UL (ref 0–0.04)
IMM GRANULOCYTES NFR BLD AUTO: 0 % (ref 0–0.5)
LDLC SERPL CALC-MCNC: 112.6 MG/DL (ref 0–100)
LIPID PROFILE,FLP: ABNORMAL
LYMPHOCYTES # BLD: 4 K/UL (ref 0.8–3.5)
LYMPHOCYTES NFR BLD: 52 % (ref 12–49)
MCH RBC QN AUTO: 30.1 PG (ref 26–34)
MCHC RBC AUTO-ENTMCNC: 33.8 G/DL (ref 30–36.5)
MCV RBC AUTO: 89.1 FL (ref 80–99)
MONOCYTES # BLD: 0.5 K/UL (ref 0–1)
MONOCYTES NFR BLD: 7 % (ref 5–13)
NEUTS SEG # BLD: 2.9 K/UL (ref 1.8–8)
NEUTS SEG NFR BLD: 38 % (ref 32–75)
NRBC # BLD: 0 K/UL (ref 0–0.01)
NRBC BLD-RTO: 0 PER 100 WBC
PLATELET # BLD AUTO: 267 K/UL (ref 150–400)
PMV BLD AUTO: 10.7 FL (ref 8.9–12.9)
POTASSIUM SERPL-SCNC: 3.6 MMOL/L (ref 3.5–5.1)
PROT SERPL-MCNC: 6.4 G/DL (ref 6.4–8.2)
RBC # BLD AUTO: 4.75 M/UL (ref 3.8–5.2)
SODIUM SERPL-SCNC: 138 MMOL/L (ref 136–145)
TRIGL SERPL-MCNC: 177 MG/DL (ref ?–150)
TSH SERPL DL<=0.05 MIU/L-ACNC: 2.39 UIU/ML (ref 0.36–3.74)
VLDLC SERPL CALC-MCNC: 35.4 MG/DL
WBC # BLD AUTO: 7.6 K/UL (ref 3.6–11)

## 2021-01-22 RX ORDER — PEN NEEDLE, DIABETIC 30 GX3/16"
NEEDLE, DISPOSABLE MISCELLANEOUS
Qty: 100 PACKAGE | Refills: 11 | Status: SHIPPED | OUTPATIENT
Start: 2021-01-22 | End: 2022-02-15 | Stop reason: SDUPTHER

## 2021-01-22 NOTE — PROGRESS NOTES
Patient called and verified. Results/ recommendations reviewed with patient. Follow up appointment made as needed. Directions to bump insulin 10 units every 3 days until fasting bs are between 100-150. Verbalizes understanding.

## 2021-01-22 NOTE — PROGRESS NOTES
Continuing poor control of Diabetes. Please call pt and remind her of how to taper her insulin and check in 3 months.     [facility]

## 2021-03-19 LAB — SARS-COV-2, NAA: NEGATIVE

## 2021-03-22 DIAGNOSIS — K21.00 GASTROESOPHAGEAL REFLUX DISEASE WITH ESOPHAGITIS: ICD-10-CM

## 2021-03-22 DIAGNOSIS — G43.719 INTRACTABLE CHRONIC MIGRAINE WITHOUT AURA AND WITHOUT STATUS MIGRAINOSUS: ICD-10-CM

## 2021-03-22 DIAGNOSIS — E11.42 DIABETIC PERIPHERAL NEUROPATHY ASSOCIATED WITH TYPE 2 DIABETES MELLITUS (HCC): ICD-10-CM

## 2021-03-22 DIAGNOSIS — E11.65 UNCONTROLLED TYPE 2 DIABETES MELLITUS WITH HYPERGLYCEMIA (HCC): ICD-10-CM

## 2021-03-22 DIAGNOSIS — G43.809 CERVICOGENIC MIGRAINE: ICD-10-CM

## 2021-03-22 DIAGNOSIS — E78.00 PURE HYPERCHOLESTEROLEMIA: ICD-10-CM

## 2021-03-22 DIAGNOSIS — M62.838 MUSCLE SPASM: ICD-10-CM

## 2021-03-22 NOTE — TELEPHONE ENCOUNTER
----- Message from Génesis Nash sent at 3/22/2021 11:32 AM EDT -----  Regarding: Dr. Austin Rita: [phone]  Medication Refill    Caller (if not patient): N/A      Relationship of caller (if not patient): N/A      Best contact number(s): [phone]      Name of medication and dosage if known:  divalproex DR (DEPAKOTE) 250 mg tablet  (2 left)  Acetaminophen 325, 40 and 50mg  cyclobenzaprine (FLEXERIL) 10 mg tablet   metFORMIN ER (GLUCOPHAGE XR) 500 mg tablet  (Twice daily)  rosuvastatin (CRESTOR) 40 mg tablet   Vitamin D 1000  unit 25mg Tablet  cholecalciferol (VITAMIN D3) (1000 Units /25 mcg) tablet   insulin glargine (Lantus Solostar U-100 Insulin) 100 unit/mL (3 mL) inpn   dulaglutide (Trulicity) 1.5 XR/2.4 mL sub-q pen   Blood-Glucose Meter (FREESTYLE LITE METER) monitoring kit  refill  [place]   glucose blood VI test strips (ASCENSIA AUTODISC VI, ONE TOUCH ULTRA TEST VI) strip   Lancets      Is patient out of this medication (yes/no): yes      Pharm[facility] listed in chart? (yes/no): Yes  Pharmacy phone number: [phone]      Details to clarify the request: PT just returned from [facility] - went to [facility] ER and was diagnosed with an upper respiratory infection. PT's COVID-19 test came back negative. PT choose not to schedule follow up appt. PT was prescribed Benzonatate 200mg capsules 3x daily.        Génesis Nash

## 2021-03-24 NOTE — TELEPHONE ENCOUNTER
Multiple attempts made to contact patient. LVM requesting return call to office to clarify which RXs are needing refill.

## 2021-03-25 DIAGNOSIS — G40.219 PARTIAL EPILEPSY WITH IMPAIRMENT OF CONSCIOUSNESS, INTRACTABLE (HCC): ICD-10-CM

## 2021-03-25 RX ORDER — PAROXETINE HYDROCHLORIDE 20 MG/1
20 TABLET, FILM COATED ORAL DAILY
Qty: 90 TAB | Refills: 1 | Status: SHIPPED | OUTPATIENT
Start: 2021-03-25

## 2021-03-25 RX ORDER — BUTALBITAL, ACETAMINOPHEN AND CAFFEINE 50; 325; 40 MG/1; MG/1; MG/1
TABLET ORAL
Qty: 40 TAB | Refills: 5 | Status: SHIPPED | OUTPATIENT
Start: 2021-03-25 | End: 2021-11-18 | Stop reason: SDUPTHER

## 2021-03-25 RX ORDER — LANCETS
EACH MISCELLANEOUS
Qty: 1 EACH | Refills: 11 | Status: SHIPPED | OUTPATIENT
Start: 2021-03-25

## 2021-03-25 RX ORDER — CELECOXIB 200 MG/1
200 CAPSULE ORAL 2 TIMES DAILY
Qty: 180 CAP | Refills: 1 | Status: SHIPPED
Start: 2021-03-25 | End: 2022-01-18

## 2021-03-25 RX ORDER — DIVALPROEX SODIUM 250 MG/1
TABLET, DELAYED RELEASE ORAL
Qty: 180 TAB | Refills: 5 | Status: SHIPPED | OUTPATIENT
Start: 2021-03-25 | End: 2022-02-15 | Stop reason: SDUPTHER

## 2021-03-25 RX ORDER — CYCLOBENZAPRINE HCL 10 MG
10 TABLET ORAL
Qty: 180 TAB | Refills: 1 | Status: SHIPPED | OUTPATIENT
Start: 2021-03-25

## 2021-03-25 RX ORDER — INSULIN GLARGINE 100 [IU]/ML
80 INJECTION, SOLUTION SUBCUTANEOUS DAILY
Qty: 24 ML | Refills: 3 | Status: SHIPPED | OUTPATIENT
Start: 2021-03-25 | End: 2022-02-15

## 2021-03-25 RX ORDER — ROSUVASTATIN CALCIUM 40 MG/1
40 TABLET, COATED ORAL
Qty: 90 TAB | Refills: 1 | Status: SHIPPED | OUTPATIENT
Start: 2021-03-25

## 2021-03-25 RX ORDER — METFORMIN HYDROCHLORIDE 500 MG/1
TABLET, EXTENDED RELEASE ORAL
Qty: 360 TAB | Refills: 1 | Status: SHIPPED | OUTPATIENT
Start: 2021-03-25 | End: 2022-02-15 | Stop reason: SDUPTHER

## 2021-03-25 RX ORDER — OMEPRAZOLE 20 MG/1
20 CAPSULE, DELAYED RELEASE ORAL DAILY
Qty: 90 CAP | Refills: 1 | Status: SHIPPED | OUTPATIENT
Start: 2021-03-25

## 2021-03-25 RX ORDER — DULAGLUTIDE 1.5 MG/.5ML
1.5 INJECTION, SOLUTION SUBCUTANEOUS
Qty: 12 SYRINGE | Refills: 1 | Status: SHIPPED | OUTPATIENT
Start: 2021-03-25

## 2021-03-25 NOTE — TELEPHONE ENCOUNTER
Refills pended to [employer] [facility]. Gabapentin refill also requested. Gabapentin Must go to [address] on file per patient.

## 2021-03-25 NOTE — TELEPHONE ENCOUNTER
Please send Gabapentin as pended to [facility] on file as [facility] will not accept this RX electronically.

## 2021-03-26 RX ORDER — GABAPENTIN 300 MG/1
CAPSULE ORAL
Qty: 30 CAP | Refills: 0 | Status: SHIPPED | OUTPATIENT
Start: 2021-03-26 | End: 2021-06-14 | Stop reason: SDUPTHER

## 2021-05-18 ENCOUNTER — DOCUMENTATION ONLY (OUTPATIENT)
Dept: FAMILY MEDICINE CLINIC | Age: 66
End: 2021-05-18

## 2021-05-18 NOTE — PROGRESS NOTES
CMn for pneumatic compression device was put on [facility] desk to process

## 2021-06-14 DIAGNOSIS — G40.219 PARTIAL EPILEPSY WITH IMPAIRMENT OF CONSCIOUSNESS, INTRACTABLE (HCC): ICD-10-CM

## 2021-06-14 RX ORDER — GABAPENTIN 300 MG/1
CAPSULE ORAL
Qty: 30 CAPSULE | Refills: 0 | Status: SHIPPED | OUTPATIENT
Start: 2021-06-14 | End: 2021-08-23

## 2021-07-27 ENCOUNTER — DOCUMENTATION ONLY (OUTPATIENT)
Dept: FAMILY MEDICINE CLINIC | Age: 66
End: 2021-07-27

## 2021-07-27 NOTE — PROGRESS NOTES
[facility] hereditary Cardiovascular profile report was put on [facility] desk

## 2021-07-29 ENCOUNTER — DOCUMENTATION ONLY (OUTPATIENT)
Dept: FAMILY MEDICINE CLINIC | Age: 66
End: 2021-07-29

## 2021-07-29 NOTE — PROGRESS NOTES
[facility] request was put on [facility] desk to process.

## 2021-08-02 ENCOUNTER — DOCUMENTATION ONLY (OUTPATIENT)
Dept: FAMILY MEDICINE CLINIC | Age: 66
End: 2021-08-02

## 2021-08-02 NOTE — PROGRESS NOTES
[facility] cancer genetics requisition form was put on [facility] desk to process.

## 2021-08-06 ENCOUNTER — TELEPHONE (OUTPATIENT)
Dept: FAMILY MEDICINE CLINIC | Age: 66
End: 2021-08-06

## 2021-08-09 ENCOUNTER — DOCUMENTATION ONLY (OUTPATIENT)
Dept: FAMILY MEDICINE CLINIC | Age: 66
End: 2021-08-09

## 2021-08-09 NOTE — PROGRESS NOTES
[facility] cancer genetics requisition form was signed & faxed to [phone],GEORGE SANTOS placed in scan folder to be scanned to chart.

## 2021-08-10 ENCOUNTER — TELEPHONE (OUTPATIENT)
Dept: FAMILY MEDICINE CLINIC | Age: 66
End: 2021-08-10

## 2021-08-10 NOTE — TELEPHONE ENCOUNTER
[facility]/Stephani stated they did not receive the fax sent in previous message.   She would like paperwork faxed to: [phone] or [phone]

## 2021-08-10 NOTE — TELEPHONE ENCOUNTER
Stephani/[employer] stated wrong labs were faxed over. Please see previous notes. Stephani's phone: [phone]. She stated she did send another lab order form via fax today.

## 2021-08-11 ENCOUNTER — DOCUMENTATION ONLY (OUTPATIENT)
Dept: FAMILY MEDICINE CLINIC | Age: 66
End: 2021-08-11

## 2021-08-11 NOTE — PROGRESS NOTES
Cancer genetics form was signed & faxed to [phone] placed in scan folder to be scanned to chart.

## 2021-09-07 ENCOUNTER — DOCUMENTATION ONLY (OUTPATIENT)
Dept: FAMILY MEDICINE CLINIC | Age: 66
End: 2021-09-07

## 2021-09-13 ENCOUNTER — DOCUMENTATION ONLY (OUTPATIENT)
Dept: FAMILY MEDICINE CLINIC | Age: 66
End: 2021-09-13

## 2021-09-13 NOTE — PROGRESS NOTES
[facility] lab results/request was put on [facility] desk to process

## 2021-09-15 ENCOUNTER — DOCUMENTATION ONLY (OUTPATIENT)
Dept: FAMILY MEDICINE CLINIC | Age: 66
End: 2021-09-15

## 2021-09-20 ENCOUNTER — DOCUMENTATION ONLY (OUTPATIENT)
Dept: FAMILY MEDICINE CLINIC | Age: 66
End: 2021-09-20

## 2021-11-18 RX ORDER — BUTALBITAL, ACETAMINOPHEN AND CAFFEINE 50; 325; 40 MG/1; MG/1; MG/1
TABLET ORAL
Qty: 40 TABLET | Refills: 5 | Status: SHIPPED | OUTPATIENT
Start: 2021-11-18 | End: 2022-02-15 | Stop reason: SDUPTHER

## 2021-11-18 RX ORDER — BUTALBITAL, ACETAMINOPHEN AND CAFFEINE 50; 325; 40 MG/1; MG/1; MG/1
TABLET ORAL
Qty: 40 TABLET | Refills: 5 | Status: SHIPPED | OUTPATIENT
Start: 2021-11-18 | End: 2021-11-18 | Stop reason: SDUPTHER

## 2021-11-18 NOTE — TELEPHONE ENCOUNTER
----- Message from Michael Luu sent at 11/17/2021 12:11 PM EST -----  Regarding: ERWIN Cisneros/Telephone  General Message/Vendor Calls    Caller's first and last name: Self       Reason for call: Patient lost her butalbital-acetaminophen-caffeine (FIORICET, ESGIC) -40 mg per tablet [[id number]]       Callback required yes/no and why: Yes Please send a new prescription. The patient lost her Fioricet. Best contact number(s): [phone]      Details to clarify the request: Please send a new prescription. The patient lost her Fioricet.        Michael Lagunai

## 2022-01-18 ENCOUNTER — VIRTUAL VISIT (OUTPATIENT)
Dept: FAMILY MEDICINE CLINIC | Age: 67
End: 2022-01-18
Payer: MEDICARE

## 2022-01-18 DIAGNOSIS — J20.9 ACUTE BRONCHITIS, UNSPECIFIED ORGANISM: Primary | ICD-10-CM

## 2022-01-18 DIAGNOSIS — E11.42 DIABETIC PERIPHERAL NEUROPATHY ASSOCIATED WITH TYPE 2 DIABETES MELLITUS (HCC): ICD-10-CM

## 2022-01-18 PROCEDURE — G8427 DOCREV CUR MEDS BY ELIG CLIN: HCPCS | Performed by: NURSE PRACTITIONER

## 2022-01-18 PROCEDURE — 1101F PT FALLS ASSESS-DOCD LE1/YR: CPT | Performed by: NURSE PRACTITIONER

## 2022-01-18 PROCEDURE — 3017F COLORECTAL CA SCREEN DOC REV: CPT | Performed by: NURSE PRACTITIONER

## 2022-01-18 PROCEDURE — G0463 HOSPITAL OUTPT CLINIC VISIT: HCPCS | Performed by: NURSE PRACTITIONER

## 2022-01-18 PROCEDURE — 99214 OFFICE O/P EST MOD 30 MIN: CPT | Performed by: NURSE PRACTITIONER

## 2022-01-18 PROCEDURE — G8400 PT W/DXA NO RESULTS DOC: HCPCS | Performed by: NURSE PRACTITIONER

## 2022-01-18 PROCEDURE — 1090F PRES/ABSN URINE INCON ASSESS: CPT | Performed by: NURSE PRACTITIONER

## 2022-01-18 PROCEDURE — G8432 DEP SCR NOT DOC, RNG: HCPCS | Performed by: NURSE PRACTITIONER

## 2022-01-18 PROCEDURE — 3046F HEMOGLOBIN A1C LEVEL >9.0%: CPT | Performed by: NURSE PRACTITIONER

## 2022-01-18 PROCEDURE — G9899 SCRN MAM PERF RSLTS DOC: HCPCS | Performed by: NURSE PRACTITIONER

## 2022-01-18 PROCEDURE — 2022F DILAT RTA XM EVC RTNOPTHY: CPT | Performed by: NURSE PRACTITIONER

## 2022-01-18 RX ORDER — ALBUTEROL SULFATE 90 UG/1
2 AEROSOL, METERED RESPIRATORY (INHALATION)
Qty: 1 EACH | Refills: 0 | Status: SHIPPED | OUTPATIENT
Start: 2022-01-18 | End: 2023-01-13

## 2022-01-18 RX ORDER — BENZONATATE 200 MG/1
200 CAPSULE ORAL
Qty: 30 CAPSULE | Refills: 0 | Status: SHIPPED | OUTPATIENT
Start: 2022-01-18 | End: 2022-01-28

## 2022-01-18 RX ORDER — PREDNISONE 20 MG/1
40 TABLET ORAL
Qty: 8 TABLET | Refills: 0 | Status: SHIPPED | OUTPATIENT
Start: 2022-01-18 | End: 2022-01-22

## 2022-01-18 NOTE — PROGRESS NOTES
Apryl Story is a 75 y.o. female who was seen by synchronous (real-time) audio-video technology on 1/18/2022 for Cough and Shortness of Breath        Assessment & Plan:   Diagnoses and all orders for this visit:    1. Acute bronchitis, unspecified organism  Add steroid burst  Add inhaler  Add cough suppressant  -     benzonatate (TESSALON) 200 mg capsule; Take 1 Capsule by mouth three (3) times daily as needed for Cough for up to 10 days. -     predniSONE (DELTASONE) 20 mg tablet; Take 40 mg by mouth daily (with breakfast) for 4 days. -     albuterol (PROVENTIL HFA, VENTOLIN HFA, PROAIR HFA) 90 mcg/actuation inhaler; Take 2 Puffs by inhalation every six (6) hours as needed for Wheezing or Cough for up to 360 days. 2. Diabetic peripheral neuropathy associated with type 2 diabetes mellitus ([facility] [address]  Well overdue for a1c check  Well above goal at last check  dwp use of steroid for respiratory symptoms will likely cause elevation of blood sugar- she is to monitor fasting sugar before breakfast daily, call office if consistently above 200 for adjustment of lantus dose  Schedule appt in next 4 weeks for AWV, diabetes f/u- patient and  verbalize agreement with plan    Follow-up and Dispositions    · Return in about 4 weeks (around 2/15/2022), or if symptoms worsen or fail to improve, for AWV, diabetes. I have discussed the diagnosis with the patient and the intended plan as seen in the above orders, and questions were answered concerning future plans. Patient conveyed understanding of the plan at the time of the visit. 712  Subjective:     HPI:    Presents for evaluation of cough, congestion. C/o dry cough, congestion, difficulty breathing through nose. Feels short of breath after coughing, denies wheezing, dyspnea on exertion. Symptoms began 12/24, improved a bit after a few days of taking mucinex, and have stayed about the same since. No fever or chills. No sputum production.    Has diabetes, takes metformin, lantus. Last A1C about 1 year ago, well over goal. Monitors blood glucose sporadically, no recent measurements to report today. Compliant with diet most of the time by her report. No acute symptoms. Prior to Admission medications    Medication Sig Start Date End Date Taking? Authorizing Provider   benzonatate (TESSALON) 200 mg capsule Take 1 Capsule by mouth three (3) times daily as needed for Cough for up to 10 days. 1/18/22 1/28/22 Yes Kolton Araujo, ERWIN   predniSONE (DELTASONE) 20 mg tablet Take 40 mg by mouth daily (with breakfast) for 4 days. 1/18/22 1/22/22 Yes Kolton Araujo, ERWIN   albuterol (PROVENTIL HFA, VENTOLIN HFA, PROAIR HFA) 90 mcg/actuation inhaler Take 2 Puffs by inhalation every six (6) hours as needed for Wheezing or Cough for up to 360 days. 1/18/22 1/13/23 Yes Mayra Araujo NP   butalbital-acetaminophen-caffeine (FIORICET, ESGIC) -40 mg per tablet Take 1-2 tablets by mouth every 8 hours PRN for headache. 11/18/21  Yes Sixto Varma NP   gabapentin (NEURONTIN) 300 mg capsule TAKE 1 CAPSULE BY MOUTH AT NIGHT 8/23/21  Yes Brittney Wynn MD   divalproex DR (DEPAKOTE) 250 mg tablet Take 3 tablets by mouth in the AM and 3 tablets by mouth QHS 3/25/21  Yes Brittney Wynn MD   insulin glargine (Lantus Solostar U-100 Insulin) 100 unit/mL (3 mL) inpn 80 Units by SubCUTAneous route daily. 3/25/21 1/5/23 Yes Brittney Wynn MD   metFORMIN ER (GLUCOPHAGE XR) 500 mg tablet Titrate gradually up until taking 4 tablets daily 3/25/21  Yes Brittney Wynn MD   PARoxetine (PaxiL) 20 mg tablet Take 1 Tab by mouth daily. 3/25/21  Yes Brittney Wynn MD   cyclobenzaprine (FLEXERIL) 10 mg tablet Take 1 Tab by mouth two (2) times daily as needed for Muscle Spasm(s). 3/25/21  Yes Brittney Wynn MD   dulaglutide (Trulicity) 1.5 LY/9.1 mL sub-q pen 0.5 mL by SubCUTAneous route every seven (7) days.  3/25/21  Yes Brittney Wynn MD   glucose blood VI test strips (ASCENSIA AUTODISC VI, ONE TOUCH ULTRA TEST VI) strip Testing BID, dx: E11.65 3/25/21  Yes Luis Antonio Riggins MD   lancets misc Testing BID, dx: E11.65 3/25/21  Yes Luis Antonio Riggins MD   omeprazole (PRILOSEC) 20 mg capsule Take 1 Cap by mouth daily. 3/25/21  Yes Luis Antonio Riggins MD   rosuvastatin (CRESTOR) 40 mg tablet Take 1 Tab by mouth nightly. 3/25/21  Yes Luis Antonio Riggins MD   Insulin Needles, Disposable, 31 gauge x 5/16\" ndle Using with Lantus and Trulicity, 8 per week, Dx: E11.65 1/22/21  Yes Luis Antonio Riggins MD   cholecalciferol (VITAMIN D3) (1000 Units /25 mcg) tablet Take 5 Tabs by mouth daily. 2/12/20  Yes Luis Antonio Riggins MD   Blood-Glucose Meter (FREESTYLE LITE METER) monitoring kit Check sugars bid. 7/9/19  Yes Luis Antonio Riggins MD   Blood-Glucose Meter monitoring kit Testing BID, dx: E11.65 2/6/19  Yes Grace Hernandez, NP   naloxone [facility]) 4 mg/actuation nasal spray Use 1 spray intranasally, then discard. Repeat with new spray every 2 min as needed for opioid overdose symptoms, alternating nostrils. 2/6/19  Yes Grace Hernandez, NP   ALPRAZOLAM (XANAX PO) Take  by mouth. Take as needed. Yes Provider, Historical   diphenhydrAMINE (BENADRYL ALLERGY) 25 mg tablet Take 25 mg by mouth every six (6) hours as needed. Yes Provider, Historical   celecoxib (CeleBREX) 200 mg capsule Take 1 Cap by mouth two (2) times a day. Take  by mouth two (2) times a day. Patient not taking: Reported on 1/18/2022 3/25/21   Luis Antonio Riggins MD   levocetirizine (XYZAL) 5 mg tablet Take 1 Tab by mouth daily. Patient not taking: Reported on 1/18/2022 3/11/20   Luis Antonio Riggins MD   acetaminophen-caffeine 500-65 mg (EXCEDRINE TENSION HEADACHE) 500-65 mg tab Take 1 Tab by mouth every eight (8) hours as needed for Headache.   Patient not taking: Reported on 1/18/2022 6/19/19   Akira Yoon NP     Patient Active Problem List   Diagnosis Code    Partial epilepsy with impairment of consciousness, intractable ([facility] G40.219    Intractable migraine without aura G43.019    Headache(784.0) R51    Post concussive syndrome F07.81    Type II or unspecified type diabetes mellitus without mention of complication, uncontrolled [id number]    Hyperlipidemia E78.5    Diabetic peripheral neuropathy associated with type 2 diabetes mellitus ([facility] [address] E11.42    Severe obesity (BMI 35.0-39. 9) E66.01    Legal blindness, as defined in [place] H54.8    Uncontrolled type 2 diabetes mellitus with hyperglycemia (HCC) E11.65     Allergies   Allergen Reactions    Flexall [Menthol-Aloe Vera Extract] Swelling    Sudafed [Pseudoephedrine Hcl] Other (comments)     Jittery      Past Medical History:   Diagnosis Date    Diabetic peripheral neuropathy associated with type 2 diabetes mellitus (HCC)     Dyslipidemia     Hx of migraines     Hyperlipidemia     Type II or unspecified type diabetes mellitus without mention of complication, uncontrolled     Unspecified epilepsy without mention of intractable epilepsy     Vision decreased      Past Surgical History:   Procedure Laterality Date    COLONOSCOPY N/A 1/30/2019    COLONOSCOPY performed by Margot Field MD at [facility] HX CATARACT REMOVAL Bilateral     HX COLONOSCOPY      HX ENDOSCOPY  04/2016    EGD, stretched her esopaghus, recurrent issue     HX GYN Bilateral     tubaligation    HX ORTHOPAEDIC      both knees, L shoulder     HX REFRACTIVE SURGERY  02/05/2012    right eye    HX RETINAL DETACHMENT REPAIR Bilateral      Family History   Problem Relation Age of Onset    Cancer Mother     Cancer Other     Heart Disease Father      Social History     Tobacco Use    Smoking status: Never Smoker    Smokeless tobacco: Never Used   Substance Use Topics    Alcohol use: No       Review of Systems   Constitutional: Negative for chills, fever, malaise/fatigue and weight loss. HENT: Positive for congestion. Eyes: Negative. Respiratory: Positive for cough.  Negative for hemoptysis, sputum production, shortness of breath and wheezing. Cardiovascular: Negative. Gastrointestinal: Negative. Genitourinary: Negative. Skin: Negative. Neurological: Negative. Endo/Heme/Allergies: Negative. Psychiatric/Behavioral: Negative. Objective:   No flowsheet data found. General: alert, cooperative, no distress   Mental  status: normal mood, behavior, speech, dress, motor activity, and thought processes, able to follow commands   HENT: NCAT   Neck: no visualized mass   Resp: no respiratory distress   Neuro: no gross deficits   Skin: no discoloration or lesions of concern on visible areas   Psychiatric: normal affect, consistent with stated mood, no evidence of hallucinations     Additional exam findings:   none    We discussed the expected course, resolution and complications of the diagnosis(es) in detail. Medication risks, benefits, costs, interactions, and alternatives were discussed as indicated. I advised her to contact the office if her condition worsens, changes or fails to improve as anticipated. She expressed understanding with the diagnosis(es) and plan. Lori Price, was evaluated through a synchronous (real-time) audio-video encounter. The patient (or guardian if applicable) is aware that this is a billable service, which includes applicable co-pays. Verbal consent to proceed has been obtained. The visit was conducted pursuant to the emergency declaration under the [address] [place] [address] authority and the [employer] General Act. Patient identification was verified, and a caregiver was present when appropriate. The patient was located in a state where the provider was licensed to provide care.     Thi Jha NP  1/18/2022

## 2022-01-18 NOTE — PROGRESS NOTES
Chief Complaint   Patient presents with    Cough    Shortness of Breath     Patient vv appt today for sob and cough that has been present since 12/25. Pt attempted to be seen at ED-was not able to be seen. Returned home and have been treating with otc products. Cough is worse with cold weather and smoke. Cough is dry. Have been treating with mucinex, dayquil,and otc cough syrup with no relief noted. 1. Have you been to the ER, urgent care clinic since your last visit? Hospitalized since your last visit? No    2. Have you seen or consulted any other health care providers outside of the [address] [place] [address] since your last visit? Include any pap smears or colon screening.  No

## 2022-01-20 ENCOUNTER — DOCUMENTATION ONLY (OUTPATIENT)
Dept: FAMILY MEDICINE CLINIC | Age: 67
End: 2022-01-20

## 2022-01-26 ENCOUNTER — DOCUMENTATION ONLY (OUTPATIENT)
Dept: FAMILY MEDICINE CLINIC | Age: 67
End: 2022-01-26

## 2022-01-26 NOTE — PROGRESS NOTES
[facility] request & last office note faxed to [phone],ok,Copy placed in scan folder to be scanned to chart.

## 2022-02-15 ENCOUNTER — OFFICE VISIT (OUTPATIENT)
Dept: FAMILY MEDICINE CLINIC | Age: 67
End: 2022-02-15
Payer: MEDICARE

## 2022-02-15 VITALS
HEART RATE: 82 BPM | SYSTOLIC BLOOD PRESSURE: 122 MMHG | WEIGHT: 145 LBS | RESPIRATION RATE: 16 BRPM | OXYGEN SATURATION: 99 % | DIASTOLIC BLOOD PRESSURE: 74 MMHG | BODY MASS INDEX: 27.38 KG/M2 | TEMPERATURE: 97.7 F | HEIGHT: 61 IN

## 2022-02-15 DIAGNOSIS — M62.838 MUSCLE SPASM: ICD-10-CM

## 2022-02-15 DIAGNOSIS — G40.219 PARTIAL EPILEPSY WITH IMPAIRMENT OF CONSCIOUSNESS, INTRACTABLE (HCC): ICD-10-CM

## 2022-02-15 DIAGNOSIS — G43.719 INTRACTABLE CHRONIC MIGRAINE WITHOUT AURA AND WITHOUT STATUS MIGRAINOSUS: ICD-10-CM

## 2022-02-15 DIAGNOSIS — E78.00 PURE HYPERCHOLESTEROLEMIA: ICD-10-CM

## 2022-02-15 DIAGNOSIS — H54.8 LEGAL BLINDNESS, AS DEFINED IN UNITED STATES OF AMERICA: ICD-10-CM

## 2022-02-15 DIAGNOSIS — E11.65 UNCONTROLLED TYPE 2 DIABETES MELLITUS WITH HYPERGLYCEMIA (HCC): ICD-10-CM

## 2022-02-15 DIAGNOSIS — Z00.01 ENCOUNTER FOR ROUTINE ADULT HEALTH EXAMINATION WITH ABNORMAL FINDINGS: Primary | ICD-10-CM

## 2022-02-15 DIAGNOSIS — G43.809 CERVICOGENIC MIGRAINE: ICD-10-CM

## 2022-02-15 DIAGNOSIS — E11.42 DIABETIC PERIPHERAL NEUROPATHY ASSOCIATED WITH TYPE 2 DIABETES MELLITUS (HCC): ICD-10-CM

## 2022-02-15 PROCEDURE — 1090F PRES/ABSN URINE INCON ASSESS: CPT | Performed by: FAMILY MEDICINE

## 2022-02-15 PROCEDURE — 1101F PT FALLS ASSESS-DOCD LE1/YR: CPT | Performed by: FAMILY MEDICINE

## 2022-02-15 PROCEDURE — G0439 PPPS, SUBSEQ VISIT: HCPCS | Performed by: FAMILY MEDICINE

## 2022-02-15 PROCEDURE — G0463 HOSPITAL OUTPT CLINIC VISIT: HCPCS | Performed by: FAMILY MEDICINE

## 2022-02-15 PROCEDURE — 99214 OFFICE O/P EST MOD 30 MIN: CPT | Performed by: FAMILY MEDICINE

## 2022-02-15 PROCEDURE — G8427 DOCREV CUR MEDS BY ELIG CLIN: HCPCS | Performed by: FAMILY MEDICINE

## 2022-02-15 PROCEDURE — G8536 NO DOC ELDER MAL SCRN: HCPCS | Performed by: FAMILY MEDICINE

## 2022-02-15 PROCEDURE — 2022F DILAT RTA XM EVC RTNOPTHY: CPT | Performed by: FAMILY MEDICINE

## 2022-02-15 PROCEDURE — G9899 SCRN MAM PERF RSLTS DOC: HCPCS | Performed by: FAMILY MEDICINE

## 2022-02-15 PROCEDURE — 3046F HEMOGLOBIN A1C LEVEL >9.0%: CPT | Performed by: FAMILY MEDICINE

## 2022-02-15 PROCEDURE — 3017F COLORECTAL CA SCREEN DOC REV: CPT | Performed by: FAMILY MEDICINE

## 2022-02-15 PROCEDURE — G8400 PT W/DXA NO RESULTS DOC: HCPCS | Performed by: FAMILY MEDICINE

## 2022-02-15 PROCEDURE — G8419 CALC BMI OUT NRM PARAM NOF/U: HCPCS | Performed by: FAMILY MEDICINE

## 2022-02-15 PROCEDURE — G8510 SCR DEP NEG, NO PLAN REQD: HCPCS | Performed by: FAMILY MEDICINE

## 2022-02-15 RX ORDER — PEN NEEDLE, DIABETIC 30 GX3/16"
NEEDLE, DISPOSABLE MISCELLANEOUS
Qty: 100 EACH | Refills: 5 | Status: SHIPPED | OUTPATIENT
Start: 2022-02-15 | End: 2022-02-16 | Stop reason: SDUPTHER

## 2022-02-15 RX ORDER — METFORMIN HYDROCHLORIDE 500 MG/1
TABLET, EXTENDED RELEASE ORAL
Qty: 360 TABLET | Refills: 1 | Status: SHIPPED | OUTPATIENT
Start: 2022-02-15 | End: 2022-02-16 | Stop reason: SDUPTHER

## 2022-02-15 RX ORDER — DIVALPROEX SODIUM 250 MG/1
TABLET, DELAYED RELEASE ORAL
Qty: 180 TABLET | Refills: 5 | Status: SHIPPED | OUTPATIENT
Start: 2022-02-15

## 2022-02-15 RX ORDER — BUTALBITAL, ACETAMINOPHEN AND CAFFEINE 50; 325; 40 MG/1; MG/1; MG/1
TABLET ORAL
Qty: 40 TABLET | Refills: 5 | Status: SHIPPED | OUTPATIENT
Start: 2022-02-15

## 2022-02-15 RX ORDER — INSULIN GLARGINE 100 [IU]/ML
75 INJECTION, SOLUTION SUBCUTANEOUS 2 TIMES DAILY
Qty: 45 EACH | Refills: 1 | Status: SHIPPED | OUTPATIENT
Start: 2022-02-15 | End: 2023-11-28

## 2022-02-15 RX ORDER — GABAPENTIN 300 MG/1
CAPSULE ORAL
Qty: 30 CAPSULE | Refills: 3 | Status: SHIPPED | OUTPATIENT
Start: 2022-02-15 | End: 2022-08-22

## 2022-02-15 NOTE — PROGRESS NOTES
Chief Complaint   Patient presents with    Annual Wellness Visit    Diabetes    Labs     Fasting today    Medication Refill     1. Have you been to the ER, urgent care clinic since your last visit? Hospitalized since your last visit? No    2. Have you seen or consulted any other health care providers outside of the [address] [place] [address] since your last visit? Include any pap smears or colon screening. No       Lori WAY Delbert  2/15/2022  Provider:   Irish:  Diabetes Report Card   1) Have you seen the eye doctor in past year?yes    2) How would you  rate your Diabetic Diet? Fair   3) How well do you take care of your feet? Podiatry/ Pedicure   4) Do you keep your Primary Care Follow Up Appts? yes    5) Do you know your A1C goal?no    6) Do you take your medications daily? yes    7) Do you check your blood sugars? yes    8) Have you gained weight?no       9) Do you follow an exercise program?no    10) Can you do better?no      Lab Results   Component Value Date/Time    Cholesterol, total 198 01/19/2021 11:28 AM    HDL Cholesterol 50 01/19/2021 11:28 AM    LDL, calculated 112.6 (H) 01/19/2021 11:28 AM    Triglyceride 177 (H) 01/19/2021 11:28 AM    CHOL/HDL Ratio 4.0 01/19/2021 11:28 AM     Lab Results   Component Value Date/Time    Hemoglobin A1c 11.1 (H) 01/19/2021 11:28 AM    Hemoglobin A1c 10.1 (H) 08/13/2020 04:30 PM    Hemoglobin A1c 11.3 (H) 11/14/2019 11:15 AM    Glucose 233 (H) 01/19/2021 11:28 AM    Glucose (POC) 282 (H) 01/30/2019 02:57 PM    Microalbumin/Creat ratio (mg/g creat) 7 11/14/2019 11:16 AM    Microalbumin,urine random 0.88 11/14/2019 11:16 AM    LDL, calculated 112.6 (H) 01/19/2021 11:28 AM    Creatinine 0.73 01/19/2021 11:28 AM            Medicare Wellness Exam:    Chief Complaint   Patient presents with    Annual Wellness Visit    Diabetes    Labs     Fasting today    Medication Refill     she is a 75y.o. year old female who presents for evaluation for their Medicare Wellness Visit. Lawerence Sear is completed and assessed=yes  Depression Screen is completed and assessed=yes  Medication list reviewed and adjusted for accuracy=yes  Immunizations reviewed and updated=yes  Health/Preventative Screenings reviewed and updated=yes  ADL Functions reviewed=yes    Patient Active Problem List    Diagnosis    Uncontrolled type 2 diabetes mellitus with hyperglycemia ([facility] [address]    Legal blindness, as defined in [place]    Severe obesity (BMI 35.0-39. 9)    Type II or unspecified type diabetes mellitus without mention of complication, uncontrolled    Hyperlipidemia    Diabetic peripheral neuropathy associated with type 2 diabetes mellitus (HCC)    Post concussive syndrome    Partial epilepsy with impairment of consciousness, intractable (HCC)    Intractable migraine without aura    Headache(784.0)       Reviewed PmHx, RxHx, FmHx, SocHx, AllgHx and updated and dated in the chart. Review of Systems - negative except as listed above in the HPI    Objective:     Vitals:    02/15/22 1331   BP: 122/74   Pulse: 82   Resp: 16   Temp: 97.7 °F (36.5 °C)   TempSrc: Oral   SpO2: 99%   Weight: 145 lb (65.8 kg)   Height: 5' 1\" (1.549 m)       Assessment/ Plan:   Diagnoses and all orders for this visit:    1. Encounter for routine adult health examination with abnormal findings  -     HEMOGLOBIN A1C WITH EAG; Future  -     MICROALBUMIN, UR, RAND W/ MICROALB/CREAT RATIO; Future  -     LIPID PANEL; Future  -     METABOLIC PANEL, COMPREHENSIVE; Future  -     CBC WITH AUTOMATED DIFF; Future  -     TSH 3RD GENERATION; Future  See below  2. Uncontrolled type 2 diabetes mellitus with hyperglycemia (HCC)  -     metFORMIN ER (GLUCOPHAGE XR) 500 mg tablet; Titrate gradually up until taking 4 tablets daily  -     HEMOGLOBIN A1C WITH EAG; Future  -     MICROALBUMIN, UR, RAND W/ MICROALB/CREAT RATIO; Future  -     LIPID PANEL; Future  -     METABOLIC PANEL, COMPREHENSIVE;  Future  -     CBC WITH AUTOMATED DIFF; Future  -     TSH 3RD GENERATION; Future  Lab Results   Component Value Date/Time    Hemoglobin A1c 12.6 (H) 02/15/2022 02:13 PM    Hemoglobin A1c (POC) 6.5 05/28/2014 02:09 PM     A1c at goal last office visit  3. Partial epilepsy with impairment of consciousness, intractable (HCC)  -     gabapentin (NEURONTIN) 300 mg capsule; TAKE 1 CAPSULE BY MOUTH AT NIGHT    4. Intractable chronic migraine without aura and without status migrainosus  -     divalproex DR (DEPAKOTE) 250 mg tablet; Take 3 tablets by mouth in the AM and 3 tablets by mouth QHS    5. Muscle spasm  -     divalproex DR (DEPAKOTE) 250 mg tablet; Take 3 tablets by mouth in the AM and 3 tablets by mouth QHS    6. Cervicogenic migraine  -     divalproex DR (DEPAKOTE) 250 mg tablet; Take 3 tablets by mouth in the AM and 3 tablets by mouth QHS    7. Diabetic peripheral neuropathy associated with type 2 diabetes mellitus ([facility] [address]  See above  8. Legal blindness, as defined in [place] [facility]  Stable  9. Pure hypercholesterolemia  -     LIPID PANEL; Future  -     METABOLIC PANEL, COMPREHENSIVE; Future    Other orders  -     butalbital-acetaminophen-caffeine (FIORICET, ESGIC) -40 mg per tablet;  Take 1-2 tablets by mouth every 8 hours PRN for headache.  -     Insulin Needles, Disposable, 31 gauge x 5/16\" ndle; Using with Lantus and Trulicity, 8 per week, Dx: E11.65         -Pain evaluation performed in office  -Cognitive Screen performed in office  -Depression Screen, Fall risks (by up and go test)  and ADL functionality were addressed  -Medication list updated and reviewed for any changes   -A comprehensive review of medical issues and a plan was formulated  -End of life planning was addressed with pt   -Health Screenings for preventions were addressed and a plan was formulated  -Shingles Vaccine was recommended  -Discussed with patient cancer risk factors and appropriate screenings for age  -Patient evaluated for colonoscopy and referred if needed per screeing criteria  -Labs from previous visits were discussed with patient   -Discussed with patient diet and exercise and formulated a plan as needed  -An Advanced care plan was developed with the patient.  -Alcohol screening performed and was negative    -  Follow-up and Dispositions    · Return in about 3 months (around 5/15/2022). I have discussed the diagnosis with the patient and the intended plan as seen in the above orders. The patient understands and agrees with the plan. The patient has received an after-visit summary and questions were answered concerning future plans. Medication Side Effects and Warnings were discussed with patien  Patient Labs were reviewed and or requested  Patient Past Records were reviewed and or requested    There are no Patient Instructions on file for this visit.       Tobias Dakin, M.D.

## 2022-02-16 DIAGNOSIS — E11.65 UNCONTROLLED TYPE 2 DIABETES MELLITUS WITH HYPERGLYCEMIA (HCC): ICD-10-CM

## 2022-02-16 LAB
ALBUMIN SERPL-MCNC: 3.3 G/DL (ref 3.5–5)
ALBUMIN/GLOB SERPL: 1.1 {RATIO} (ref 1.1–2.2)
ALP SERPL-CCNC: 83 U/L (ref 45–117)
ALT SERPL-CCNC: 16 U/L (ref 12–78)
ANION GAP SERPL CALC-SCNC: 3 MMOL/L (ref 5–15)
AST SERPL-CCNC: 12 U/L (ref 15–37)
BASOPHILS # BLD: 0 K/UL (ref 0–0.1)
BASOPHILS NFR BLD: 1 % (ref 0–1)
BILIRUB SERPL-MCNC: 0.3 MG/DL (ref 0.2–1)
BUN SERPL-MCNC: 13 MG/DL (ref 6–20)
BUN/CREAT SERPL: 25 (ref 12–20)
CALCIUM SERPL-MCNC: 9.5 MG/DL (ref 8.5–10.1)
CHLORIDE SERPL-SCNC: 103 MMOL/L (ref 97–108)
CHOLEST SERPL-MCNC: 257 MG/DL
CO2 SERPL-SCNC: 31 MMOL/L (ref 21–32)
CREAT SERPL-MCNC: 0.52 MG/DL (ref 0.55–1.02)
CREAT UR-MCNC: 292 MG/DL
DIFFERENTIAL METHOD BLD: NORMAL
EOSINOPHIL # BLD: 0.1 K/UL (ref 0–0.4)
EOSINOPHIL NFR BLD: 1 % (ref 0–7)
ERYTHROCYTE [DISTWIDTH] IN BLOOD BY AUTOMATED COUNT: 12.3 % (ref 11.5–14.5)
EST. AVERAGE GLUCOSE BLD GHB EST-MCNC: 315 MG/DL
GLOBULIN SER CALC-MCNC: 3.1 G/DL (ref 2–4)
GLUCOSE SERPL-MCNC: 126 MG/DL (ref 65–100)
HBA1C MFR BLD: 12.6 % (ref 4–5.6)
HCT VFR BLD AUTO: 41.8 % (ref 35–47)
HDLC SERPL-MCNC: 45 MG/DL
HDLC SERPL: 5.7 {RATIO} (ref 0–5)
HGB BLD-MCNC: 13.9 G/DL (ref 11.5–16)
IMM GRANULOCYTES # BLD AUTO: 0 K/UL (ref 0–0.04)
IMM GRANULOCYTES NFR BLD AUTO: 0 % (ref 0–0.5)
LDLC SERPL CALC-MCNC: 162.6 MG/DL (ref 0–100)
LYMPHOCYTES # BLD: 2.3 K/UL (ref 0.8–3.5)
LYMPHOCYTES NFR BLD: 38 % (ref 12–49)
MCH RBC QN AUTO: 30.5 PG (ref 26–34)
MCHC RBC AUTO-ENTMCNC: 33.3 G/DL (ref 30–36.5)
MCV RBC AUTO: 91.9 FL (ref 80–99)
MICROALBUMIN UR-MCNC: 5.09 MG/DL
MICROALBUMIN/CREAT UR-RTO: 17 MG/G (ref 0–30)
MONOCYTES # BLD: 0.5 K/UL (ref 0–1)
MONOCYTES NFR BLD: 8 % (ref 5–13)
NEUTS SEG # BLD: 3.1 K/UL (ref 1.8–8)
NEUTS SEG NFR BLD: 52 % (ref 32–75)
NRBC # BLD: 0 K/UL (ref 0–0.01)
NRBC BLD-RTO: 0 PER 100 WBC
PLATELET # BLD AUTO: 235 K/UL (ref 150–400)
PMV BLD AUTO: 10.9 FL (ref 8.9–12.9)
POTASSIUM SERPL-SCNC: 3.4 MMOL/L (ref 3.5–5.1)
PROT SERPL-MCNC: 6.4 G/DL (ref 6.4–8.2)
RBC # BLD AUTO: 4.55 M/UL (ref 3.8–5.2)
SODIUM SERPL-SCNC: 137 MMOL/L (ref 136–145)
TRIGL SERPL-MCNC: 247 MG/DL (ref ?–150)
TSH SERPL DL<=0.05 MIU/L-ACNC: 0.94 UIU/ML (ref 0.36–3.74)
VLDLC SERPL CALC-MCNC: 49.4 MG/DL
WBC # BLD AUTO: 6 K/UL (ref 3.6–11)

## 2022-02-16 NOTE — TELEPHONE ENCOUNTER
----- Message from [facility] sent at 2/16/2022  7:39 AM EST -----  Subject: Medication Problem    QUESTIONS  Name of Medication? gabapentin (NEURONTIN) 300 mg capsule  Patient-reported dosage and instructions? 300MG 1 per day  What question or problem do you have with the medication? Patient said she   is needing all her medication to be sent to [facility] she already got all   of them refilled yesterday and half was supposed to be going to [facility]   but she wants all of them sent to [facility]. Pt wants Helder Pascual to call her back   at [phone]. Preferred Pharmacy? [facility] [address] [employer] phone number (if available)? [phone]  Additional Information for Provider?   ---------------------------------------------------------------------------  --------------  CALL BACK INFO  What is the best way for the office to contact you? Do not leave any   message, patient will call back for answer  Preferred Call Back Phone Number? [phone]  ---------------------------------------------------------------------------  --------------  SCRIPT ANSWERS  Relationship to Patient?  Self

## 2022-02-16 NOTE — PROGRESS NOTES
Diabetes continues to be in very poor control. We will add patient to the diabetic list.  However, I would like patient to return to office within the next 2 weeks so we can work on a game plan to get her better controlled.   Dr. Farfan Huge

## 2022-02-16 NOTE — TELEPHONE ENCOUNTER
Called and spoke with patient. Grupo Butcher)    Patient requesting pended refills to be resent to [facility]. Per patient:  Please print script for Gabapentin and contact patient when she can  at office to take to pharmacy.

## 2022-02-17 RX ORDER — MELATONIN
5000 DAILY
Qty: 90 TABLET | Refills: 3 | Status: SHIPPED | OUTPATIENT
Start: 2022-02-17 | End: 2022-02-22 | Stop reason: SDUPTHER

## 2022-02-17 RX ORDER — METFORMIN HYDROCHLORIDE 500 MG/1
TABLET, EXTENDED RELEASE ORAL
Qty: 360 TABLET | Refills: 1 | Status: SHIPPED | OUTPATIENT
Start: 2022-02-17 | End: 2022-02-22 | Stop reason: SDUPTHER

## 2022-02-17 RX ORDER — PEN NEEDLE, DIABETIC 30 GX3/16"
NEEDLE, DISPOSABLE MISCELLANEOUS
Qty: 100 EACH | Refills: 5 | Status: SHIPPED | OUTPATIENT
Start: 2022-02-17

## 2022-02-17 NOTE — PROGRESS NOTES
Called and spoke to patient. Juan Valiente Patient states understanding of lab results per Dr Eren Leblanc:  Diabetes continues to be in very poor control. I would like patient to return to office within the next 2 weeks so we can work on a game plan to get her better controlled. DM follow up appointment scheduled. 3/9/22 10:00.

## 2022-02-22 ENCOUNTER — TELEPHONE (OUTPATIENT)
Dept: FAMILY MEDICINE CLINIC | Age: 67
End: 2022-02-22

## 2022-02-22 DIAGNOSIS — E11.65 UNCONTROLLED TYPE 2 DIABETES MELLITUS WITH HYPERGLYCEMIA (HCC): ICD-10-CM

## 2022-02-22 RX ORDER — METFORMIN HYDROCHLORIDE 500 MG/1
TABLET, EXTENDED RELEASE ORAL
Qty: 360 TABLET | Refills: 1 | Status: SHIPPED | OUTPATIENT
Start: 2022-02-22

## 2022-02-22 RX ORDER — MELATONIN
5000 DAILY
Qty: 90 TABLET | Refills: 3 | Status: SHIPPED | OUTPATIENT
Start: 2022-02-22

## 2022-02-22 NOTE — TELEPHONE ENCOUNTER
Orders cancelled at [facility]. Orders for metformin and vit D sent to [facility] through [facility]. [facility] were faxed to [place].

## 2022-02-22 NOTE — TELEPHONE ENCOUNTER
Pt called and states that she would like the Insulin Needles, Disposable, 31 gauge x 5/16\" ndle     ,  The Metformin, and the Vit D3 sent to [facility] instead of [facility]. Please cancel the [facility]. Her number is [phone].

## 2022-02-22 NOTE — TELEPHONE ENCOUNTER
Lvm to patient stating I took care of medications cancelled from [facility] and sent to [facility]. Also, I would like her to call me back to discuss diabetes planning.  1st attempt

## 2022-02-23 ENCOUNTER — TELEPHONE (OUTPATIENT)
Dept: FAMILY MEDICINE CLINIC | Age: 67
End: 2022-02-23

## 2022-02-23 NOTE — TELEPHONE ENCOUNTER
Called pharmacy. Clarified pen needles and use per week. Pharmacist states they will fill that today. Call out to patient for diabetes planning. She states she has only been giving herself 1 injection of lantus per day, 100 units. Informed of new rx for lantus insulin to inject 75 units twice per day. Patient verbalizes understanding. She also takes metformin 2 tabs in am, 2 tabs in pm, and weekly trulicity. Patient has a follow up appt , 2 weeks, 3/9/22. She will monitor her blood sugars so we can compare bs once lantus is increased. Patient states he diet is \" lousy\" she eats a lot of starches. Encouraged to cut back to one startcy meal per day and cut back on sweets. Patient verbalizes understanding.

## 2022-02-23 NOTE — TELEPHONE ENCOUNTER
Pt called and states that [facility] needs a printed prescription for the needles. She would like to have it printed and she will pick it up.  Her number is [phone]

## 2022-03-09 ENCOUNTER — OFFICE VISIT (OUTPATIENT)
Dept: NEUROLOGY | Age: 67
End: 2022-03-09
Payer: MEDICARE

## 2022-03-09 ENCOUNTER — OFFICE VISIT (OUTPATIENT)
Dept: FAMILY MEDICINE CLINIC | Age: 67
End: 2022-03-09
Payer: MEDICARE

## 2022-03-09 VITALS
BODY MASS INDEX: 27.75 KG/M2 | OXYGEN SATURATION: 97 % | HEART RATE: 77 BPM | WEIGHT: 147 LBS | SYSTOLIC BLOOD PRESSURE: 104 MMHG | RESPIRATION RATE: 16 BRPM | TEMPERATURE: 97.6 F | HEIGHT: 61 IN | DIASTOLIC BLOOD PRESSURE: 68 MMHG

## 2022-03-09 VITALS — OXYGEN SATURATION: 97 % | SYSTOLIC BLOOD PRESSURE: 128 MMHG | HEART RATE: 90 BPM | DIASTOLIC BLOOD PRESSURE: 80 MMHG

## 2022-03-09 DIAGNOSIS — G40.909 SEIZURE DISORDER (HCC): ICD-10-CM

## 2022-03-09 DIAGNOSIS — E11.65 UNCONTROLLED TYPE 2 DIABETES MELLITUS WITH HYPERGLYCEMIA (HCC): Primary | ICD-10-CM

## 2022-03-09 DIAGNOSIS — E11.42 DIABETIC PERIPHERAL NEUROPATHY ASSOCIATED WITH TYPE 2 DIABETES MELLITUS (HCC): ICD-10-CM

## 2022-03-09 DIAGNOSIS — R41.3 MEMORY LOSS: Primary | ICD-10-CM

## 2022-03-09 DIAGNOSIS — G43.019 INTRACTABLE MIGRAINE WITHOUT AURA AND WITHOUT STATUS MIGRAINOSUS: ICD-10-CM

## 2022-03-09 DIAGNOSIS — G43.919 INTRACTABLE MIGRAINE WITHOUT STATUS MIGRAINOSUS, UNSPECIFIED MIGRAINE TYPE: ICD-10-CM

## 2022-03-09 DIAGNOSIS — E78.00 PURE HYPERCHOLESTEROLEMIA: ICD-10-CM

## 2022-03-09 PROCEDURE — G8427 DOCREV CUR MEDS BY ELIG CLIN: HCPCS | Performed by: FAMILY MEDICINE

## 2022-03-09 PROCEDURE — 1101F PT FALLS ASSESS-DOCD LE1/YR: CPT | Performed by: NURSE PRACTITIONER

## 2022-03-09 PROCEDURE — 3017F COLORECTAL CA SCREEN DOC REV: CPT | Performed by: NURSE PRACTITIONER

## 2022-03-09 PROCEDURE — G8536 NO DOC ELDER MAL SCRN: HCPCS | Performed by: NURSE PRACTITIONER

## 2022-03-09 PROCEDURE — G8400 PT W/DXA NO RESULTS DOC: HCPCS | Performed by: FAMILY MEDICINE

## 2022-03-09 PROCEDURE — G8419 CALC BMI OUT NRM PARAM NOF/U: HCPCS | Performed by: NURSE PRACTITIONER

## 2022-03-09 PROCEDURE — 3017F COLORECTAL CA SCREEN DOC REV: CPT | Performed by: FAMILY MEDICINE

## 2022-03-09 PROCEDURE — G8428 CUR MEDS NOT DOCUMENT: HCPCS | Performed by: NURSE PRACTITIONER

## 2022-03-09 PROCEDURE — G8419 CALC BMI OUT NRM PARAM NOF/U: HCPCS | Performed by: FAMILY MEDICINE

## 2022-03-09 PROCEDURE — G8536 NO DOC ELDER MAL SCRN: HCPCS | Performed by: FAMILY MEDICINE

## 2022-03-09 PROCEDURE — 1090F PRES/ABSN URINE INCON ASSESS: CPT | Performed by: FAMILY MEDICINE

## 2022-03-09 PROCEDURE — G8510 SCR DEP NEG, NO PLAN REQD: HCPCS | Performed by: FAMILY MEDICINE

## 2022-03-09 PROCEDURE — 3046F HEMOGLOBIN A1C LEVEL >9.0%: CPT | Performed by: FAMILY MEDICINE

## 2022-03-09 PROCEDURE — G9899 SCRN MAM PERF RSLTS DOC: HCPCS | Performed by: NURSE PRACTITIONER

## 2022-03-09 PROCEDURE — 99214 OFFICE O/P EST MOD 30 MIN: CPT | Performed by: FAMILY MEDICINE

## 2022-03-09 PROCEDURE — 1101F PT FALLS ASSESS-DOCD LE1/YR: CPT | Performed by: FAMILY MEDICINE

## 2022-03-09 PROCEDURE — G9899 SCRN MAM PERF RSLTS DOC: HCPCS | Performed by: FAMILY MEDICINE

## 2022-03-09 PROCEDURE — 1090F PRES/ABSN URINE INCON ASSESS: CPT | Performed by: NURSE PRACTITIONER

## 2022-03-09 PROCEDURE — G8510 SCR DEP NEG, NO PLAN REQD: HCPCS | Performed by: NURSE PRACTITIONER

## 2022-03-09 PROCEDURE — 99215 OFFICE O/P EST HI 40 MIN: CPT | Performed by: NURSE PRACTITIONER

## 2022-03-09 PROCEDURE — 2022F DILAT RTA XM EVC RTNOPTHY: CPT | Performed by: FAMILY MEDICINE

## 2022-03-09 PROCEDURE — G8400 PT W/DXA NO RESULTS DOC: HCPCS | Performed by: NURSE PRACTITIONER

## 2022-03-09 PROCEDURE — G0463 HOSPITAL OUTPT CLINIC VISIT: HCPCS | Performed by: FAMILY MEDICINE

## 2022-03-09 RX ORDER — PREDNISONE 10 MG/1
TABLET ORAL
Qty: 42 TABLET | Refills: 0 | Status: SHIPPED | OUTPATIENT
Start: 2022-03-09

## 2022-03-09 RX ORDER — BUTALBITAL, ACETAMINOPHEN, CAFFEINE AND CODEINE PHOSPHATE 50; 325; 40; 30 MG/1; MG/1; MG/1; MG/1
1-2 CAPSULE ORAL
Qty: 40 CAPSULE | Refills: 5 | Status: SHIPPED | OUTPATIENT
Start: 2022-03-09 | End: 2022-10-03

## 2022-03-09 NOTE — PROGRESS NOTES
Chief Complaint   Patient presents with    Diabetes    Labs     FASTING TODAY    Migraine     1. Have you been to the ER, urgent care clinic since your last visit? Hospitalized since your last visit? No    2. Have you seen or consulted any other health care providers outside of the [address] [place] [address] since your last visit? Include any pap smears or colon screening. Estefania       Radha Junior  3/9/2022  Provider:   Irish:  Diabetes Report Card   1) Have you seen the eye doctor in past year?yes    2) How would you  rate your Diabetic Diet? Fair   3) How well do you take care of your feet? Self care and Podiatry yearly   4) Do you keep your Primary Care Follow Up Appts? yes    5) Do you know your A1C goal?yes    6) Do you take your medications daily? yes    7) Do you check your blood sugars? yes    8) Have you gained weight?no       9) Do you follow an exercise program?no    10) Can you do better?no      Lab Results   Component Value Date/Time    Cholesterol, total 257 (H) 02/15/2022 02:13 PM    HDL Cholesterol 45 02/15/2022 02:13 PM    LDL, calculated 162.6 (H) 02/15/2022 02:13 PM    Triglyceride 247 (H) 02/15/2022 02:13 PM    CHOL/HDL Ratio 5.7 (H) 02/15/2022 02:13 PM     Lab Results   Component Value Date/Time    Hemoglobin A1c 12.6 (H) 02/15/2022 02:13 PM    Hemoglobin A1c 11.1 (H) 01/19/2021 11:28 AM    Hemoglobin A1c 10.1 (H) 08/13/2020 04:30 PM    Glucose 126 (H) 02/15/2022 02:13 PM    Glucose (POC) 282 (H) 01/30/2019 02:57 PM    Microalbumin/Creat ratio (mg/g creat) 17 02/15/2022 02:13 PM    Microalbumin,urine random 5.09 02/15/2022 02:13 PM    LDL, calculated 162.6 (H) 02/15/2022 02:13 PM    Creatinine 0.52 (L) 02/15/2022 02:13 PM        Lab Results   Component Value Date/Time    Microalbumin/Creat ratio (mg/g creat) 17 02/15/2022 02:13 PM    Microalbumin,urine random 5.09 02/15/2022 02:13 PM      Chief Complaint   Patient presents with    Diabetes    Labs     FASTING TODAY    Migraine     she is a 75y.o. year old female who presents for evaluation. See Diabetic Report Card listed above. Patient Active Problem List    Diagnosis    Uncontrolled type 2 diabetes mellitus with hyperglycemia ([facility]    Legal blindness, as defined in [place] [facility]    Severe obesity (BMI 35.0-39. 9)    Type II or unspecified type diabetes mellitus without mention of complication, uncontrolled    Hyperlipidemia    Diabetic peripheral neuropathy associated with type 2 diabetes mellitus (HCC)    Post concussive syndrome    Partial epilepsy with impairment of consciousness, intractable (HCC)    Intractable migraine without aura    Headache(784.0)       Reviewed PmHx, RxHx, FmHx, SocHx, AllgHx--dated and updated in the chart. Review of Systems - negative except as listed above in the HPI    Objective:     Vitals:    03/09/22 1022   BP: 104/68   Pulse: 77   Resp: 16   Temp: 97.6 °F (36.4 °C)   TempSrc: Oral   SpO2: 97%   Weight: 147 lb (66.7 kg)   Height: 5' 1\" (1.549 m)         Assessment/ Plan:   Diagnoses and all orders for this visit:    1. Uncontrolled type 2 diabetes mellitus with hyperglycemia (HCC)  -very poor control  -added to DM program  -split dose of insulin did help  -pt needs help tapering insulin  -diet is up and down based on finances    2. Diabetic peripheral neuropathy associated with type 2 diabetes mellitus ([facility]  -see above    3. Pure hypercholesterolemia  -see labs    4.  Intractable migraine without aura and without status migrainosus  -is now seeing neuro         Lab Results   Component Value Date/Time    Cholesterol, total 257 (H) 02/15/2022 02:13 PM    HDL Cholesterol 45 02/15/2022 02:13 PM    LDL, calculated 162.6 (H) 02/15/2022 02:13 PM    Triglyceride 247 (H) 02/15/2022 02:13 PM    CHOL/HDL Ratio 5.7 (H) 02/15/2022 02:13 PM     Lab Results   Component Value Date/Time    Hemoglobin A1c 12.6 (H) 02/15/2022 02:13 PM    Hemoglobin A1c 11.1 (H) 01/19/2021 11:28 AM    Hemoglobin A1c 10.1 (H) 08/13/2020 04:30 PM    Microalbumin/Creat ratio (mg/g creat) 17 02/15/2022 02:13 PM    Microalbumin,urine random 5.09 02/15/2022 02:13 PM    LDL, calculated 162.6 (H) 02/15/2022 02:13 PM    Creatinine 0.52 (L) 02/15/2022 02:13 PM          Discussed with patient goal of Diabetes to include:  HgA1C <7, LDL cholesterol <100, Blood pressure <140/80. Discussed with patient diet and weight management and to get regular exercise. Recommend yearly eye exams and daily foot care. The patient understands and agrees with the plan. I have discussed the diagnosis with the patient and the intended plan as seen in the above orders. The patient has received an after-visit summary and questions were answered concerning future plans. Medication Side Effects and Warnings were discussed with patient  Patient Labs were reviewed and or requested  Patient Past Records were reviewed and or requested    Liliam Black M.D. [facility]    There are no Patient Instructions on file for this visit.

## 2022-03-09 NOTE — PROGRESS NOTES
Queta Liu is a 75 y.o. female who presents with the following  Chief Complaint   Patient presents with    Migraine    Epilepsy       HPI     FU epilepsy, migraine. Migraines are about half the month   She uses the Fioricet but not seeing much improvement right now   She has had the current HA since last week   Nothing has helped   She uses steroids but her sugars are high, a1c is high. She has just seen her pcp. She has failed multiple preventatives. She has not tried Homero Rana. Her head usually on the back and up the side unilaterally. Light, sound, smell sensitivity. Nausea, dizziness. She has been feeling terrible. She also has noticed her memory is not as good as it once was   She feels foggy, slow. Her family thinks she is not functioning well. She forgetful, slow. Repeating a lot. Not sure about family hx. No seizures. Depakote is doing well.   3 tablets BID           Allergies   Allergen Reactions    Flexall [Menthol-Aloe Vera Extract] Swelling    Sudafed [Pseudoephedrine Hcl] Other (comments)     Jittery        Current Outpatient Medications   Medication Sig    codeine-butalbital-acetaminophen-caffeine (FIORICET WITH CODEINE) -09-30 mg capsule Take 1-2 Capsules by mouth every six (6) hours as needed for Headache for up to 30 days. Max Daily Amount: 8 Capsules.  predniSONE (DELTASONE) 10 mg tablet 6 po x2 days, 5 po x 2days, 4 po x 2days, 3 po x2days, 2 po x2days, 1 po x 2days    metFORMIN ER (GLUCOPHAGE XR) 500 mg tablet Titrate gradually up until taking 4 tablets daily    cholecalciferol (Vitamin D3) (1000 Units /25 mcg) tablet Take 5 Tablets by mouth daily.     Insulin Needles, Disposable, 31 gauge x 5/16\" ndle Using with Lantus and Trulicity, 8 per week, Dx: E11.65    gabapentin (NEURONTIN) 300 mg capsule TAKE 1 CAPSULE BY MOUTH AT NIGHT    divalproex DR (DEPAKOTE) 250 mg tablet Take 3 tablets by mouth in the AM and 3 tablets by mouth QHS    butalbital-acetaminophen-caffeine (FIORICET, ESGIC) -40 mg per tablet Take 1-2 tablets by mouth every 8 hours PRN for headache.  insulin glargine (Lantus Solostar U-100 Insulin) 100 unit/mL (3 mL) inpn 75 Units by SubCUTAneous route two (2) times a day.  albuterol (PROVENTIL HFA, VENTOLIN HFA, PROAIR HFA) 90 mcg/actuation inhaler Take 2 Puffs by inhalation every six (6) hours as needed for Wheezing or Cough for up to 360 days.  PARoxetine (PaxiL) 20 mg tablet Take 1 Tab by mouth daily.  cyclobenzaprine (FLEXERIL) 10 mg tablet Take 1 Tab by mouth two (2) times daily as needed for Muscle Spasm(s).  dulaglutide (Trulicity) 1.5 EB/8.0 mL sub-q pen 0.5 mL by SubCUTAneous route every seven (7) days.  glucose blood VI test strips (ASCENSIA AUTODISC VI, ONE TOUCH ULTRA TEST VI) strip Testing BID, dx: E11.65    lancets misc Testing BID, dx: E11.65    omeprazole (PRILOSEC) 20 mg capsule Take 1 Cap by mouth daily.  rosuvastatin (CRESTOR) 40 mg tablet Take 1 Tab by mouth nightly.  Blood-Glucose Meter (FREESTYLE LITE METER) monitoring kit Check sugars bid.  Blood-Glucose Meter monitoring kit Testing BID, dx: E11.65    ALPRAZOLAM (XANAX PO) Take  by mouth. Take as needed.  diphenhydrAMINE (BENADRYL ALLERGY) 25 mg tablet Take 25 mg by mouth every six (6) hours as needed. No current facility-administered medications for this visit.        Social History     Tobacco Use   Smoking Status Never Smoker   Smokeless Tobacco Never Used       Past Medical History:   Diagnosis Date    Diabetic peripheral neuropathy associated with type 2 diabetes mellitus (HCC)     Dyslipidemia     Hx of migraines     Hyperlipidemia     Type II or unspecified type diabetes mellitus without mention of complication, uncontrolled     Unspecified epilepsy without mention of intractable epilepsy     Vision decreased        Past Surgical History:   Procedure Laterality Date    COLONOSCOPY N/A 1/30/2019 COLONOSCOPY performed by Ifeoma Schultz MD at [facility] HX CATARACT REMOVAL Bilateral     HX COLONOSCOPY      HX ENDOSCOPY  04/2016    EGD, stretched her esopaghus, recurrent issue     HX GYN Bilateral     tubaligation    HX ORTHOPAEDIC      both knees, L shoulder     HX REFRACTIVE SURGERY  02/05/2012    right eye    HX RETINAL DETACHMENT REPAIR Bilateral        Family History   Problem Relation Age of Onset    Cancer Mother     Cancer Other     Heart Disease Father        Social History     Socioeconomic History    Marital status:    Tobacco Use    Smoking status: Never Smoker    Smokeless tobacco: Never Used   Substance and Sexual Activity    Alcohol use: No    Sexual activity: Never     Partners: Male       Review of Systems   Eyes: Positive for blurred vision, double vision and photophobia. Cardiovascular: Negative for chest pain and palpitations. Gastrointestinal: Negative for nausea and vomiting. Neurological: Positive for dizziness and headaches. Negative for seizures and loss of consciousness. Psychiatric/Behavioral: Positive for memory loss. Remainder of comprehensive review is negative. Physical Exam :    Visit Vitals  /80   Pulse 90   SpO2 97%       General: Well defined, nourished, and groomed individual in no acute distress.    Neck: Supple, nontender, no bruits, no pain with resistance to active range of motion.    Musculoskeletal: Extremities revealed no edema and had full range of motion of joints.    Psych: Good mood and bright affect    NEUROLOGICAL EXAMINATION:    Mental Status: Alert and oriented to person, place, and time    Cranial Nerves:    II, III, IV, VI: Visual acuity grossly intact. Visual fields are normal.    Pupils are equal, round, and reactive to light and accommodation.    Extra-ocular movements are full and fluid. Fundoscopic exam was benign, no ptosis or nystagmus.    V-XII: Hearing is grossly intact.  Facial features are symmetric, with normal sensation and strength. The palate rises symmetrically and the tongue protrudes midline. Sternocleidomastoids 5/5. Motor Examination: Normal tone, bulk, and strength, 3/5 muscle strength throughout. Coordination: Finger to nose was normal. No resting or intention tremor    Gait and Station: Steady while walking. Normal arm swing. No pronator drift. No muscle wasting or fasiculations noted. Reflexes: DTRs 2+ throughout. Results for orders placed or performed in visit on 02/15/22   CBC WITH AUTOMATED DIFF   Result Value Ref Range    WBC 6.0 3.6 - 11.0 K/uL    RBC 4.55 3.80 - 5.20 M/uL    HGB 13.9 11.5 - 16.0 g/dL    HCT 41.8 35.0 - 47.0 %    MCV 91.9 80.0 - 99.0 FL    MCH 30.5 26.0 - 34.0 PG    MCHC 33.3 30.0 - 36.5 g/dL    RDW 12.3 11.5 - 14.5 %    PLATELET 018 554 - 365 K/uL    MPV 10.9 8.9 - 12.9 FL    NRBC 0.0 0  WBC    ABSOLUTE NRBC 0.00 0.00 - 0.01 K/uL    NEUTROPHILS 52 32 - 75 %    LYMPHOCYTES 38 12 - 49 %    MONOCYTES 8 5 - 13 %    EOSINOPHILS 1 0 - 7 %    BASOPHILS 1 0 - 1 %    IMMATURE GRANULOCYTES 0 0.0 - 0.5 %    ABS. NEUTROPHILS 3.1 1.8 - 8.0 K/UL    ABS. LYMPHOCYTES 2.3 0.8 - 3.5 K/UL    ABS. MONOCYTES 0.5 0.0 - 1.0 K/UL    ABS. EOSINOPHILS 0.1 0.0 - 0.4 K/UL    ABS. BASOPHILS 0.0 0.0 - 0.1 K/UL    ABS. IMM. GRANS. 0.0 0.00 - 0.04 K/UL    DF AUTOMATED     METABOLIC PANEL, COMPREHENSIVE   Result Value Ref Range    Sodium 137 136 - 145 mmol/L    Potassium 3.4 (L) 3.5 - 5.1 mmol/L    Chloride 103 97 - 108 mmol/L    CO2 31 21 - 32 mmol/L    Anion gap 3 (L) 5 - 15 mmol/L    Glucose 126 (H) 65 - 100 mg/dL    BUN 13 6 - 20 MG/DL    Creatinine 0.52 (L) 0.55 - 1.02 MG/DL    BUN/Creatinine ratio 25 (H) 12 - 20      GFR est AA >60 >60 ml/min/1.73m2    GFR est non-AA >60 >60 ml/min/1.73m2    Calcium 9.5 8.5 - 10.1 MG/DL    Bilirubin, total 0.3 0.2 - 1.0 MG/DL    ALT (SGPT) 16 12 - 78 U/L    AST (SGOT) 12 (L) 15 - 37 U/L    Alk.  phosphatase 83 45 - 117 U/L Protein, total 6.4 6.4 - 8.2 g/dL    Albumin 3.3 (L) 3.5 - 5.0 g/dL    Globulin 3.1 2.0 - 4.0 g/dL    A-G Ratio 1.1 1.1 - 2.2     LIPID PANEL   Result Value Ref Range    Cholesterol, total 257 (H) <200 MG/DL    Triglyceride 247 (H) <150 MG/DL    HDL Cholesterol 45 MG/DL    LDL, calculated 162.6 (H) 0 - 100 MG/DL    VLDL, calculated 49.4 MG/DL    CHOL/HDL Ratio 5.7 (H) 0.0 - 5.0     MICROALBUMIN, UR, RAND W/ MICROALB/CREAT RATIO   Result Value Ref Range    Microalbumin,urine random 5.09 MG/DL    Creatinine, urine 292.00 mg/dL    Microalbumin/Creat ratio (mg/g creat) 17 0 - 30 mg/g   HEMOGLOBIN A1C WITH EAG   Result Value Ref Range    Hemoglobin A1c 12.6 (H) 4.0 - 5.6 %    Est. average glucose 315 mg/dL   TSH 3RD GENERATION   Result Value Ref Range    TSH 0.94 0.36 - 3.74 uIU/mL       Orders Placed This Encounter    MRI BRAIN WO CONT     Standing Status:   Future     Standing Expiration Date:   2023    DEXA BONE DENSITY STUDY AXIAL     Standing Status:   Future     Standing Expiration Date:   2022    REFERRAL TO NEUROPSYCHOLOGY     Referral Priority:   Routine     Referral Type:   Consultation     Referral Reason:   Specialty Services Required     Referred to Provider:   Paulo Gayle PsyD     Number of Visits Requested:   1    EEG     Standing Status:   Future     Standing Expiration Date:   2022     Order Specific Question:   Reason for Exam:     Answer:   memory loss    codeine-butalbital-acetaminophen-caffeine (FIORICET WITH CODEINE) -33-30 mg capsule     Sig: Take 1-2 Capsules by mouth every six (6) hours as needed for Headache for up to 30 days. Max Daily Amount: 8 Capsules. Dispense:  40 Capsule     Refill:  5    predniSONE (DELTASONE) 10 mg tablet     Si po x2 days, 5 po x 2days, 4 po x 2days, 3 po x2days, 2 po x2days, 1 po x 2days     Dispense:  42 Tablet     Refill:  0       1. Memory loss    2.  Intractable migraine without status migrainosus, unspecified migraine type 3. Seizure disorder ([facility] [address]        Memory loss:   Refer to neuropsychiatry to help evaluate memory further looking at dementia vs. Seizures vs other   MRI brain to rule out stroke, tumor, lesion, mass . EEG to look at epilepsy. Keep brain active. DEXA scan to look at bone density with Depakote on board for years. She will use a prednisone taper to break current HA   Watch sugars as a1c 12   Fioricet with codeine for PRN   Qulipta 60 mg nightly for prevention   Call if it helps.              This note will not be viewable in [employer]

## 2022-03-10 ENCOUNTER — TELEPHONE (OUTPATIENT)
Dept: NEUROLOGY | Age: 67
End: 2022-03-10

## 2022-03-10 ENCOUNTER — TELEPHONE (OUTPATIENT)
Dept: FAMILY MEDICINE CLINIC | Age: 67
End: 2022-03-10

## 2022-03-10 ENCOUNTER — TRANSCRIBE ORDER (OUTPATIENT)
Dept: SCHEDULING | Age: 67
End: 2022-03-10

## 2022-03-10 DIAGNOSIS — Z12.31 VISIT FOR SCREENING MAMMOGRAM: Primary | ICD-10-CM

## 2022-03-10 DIAGNOSIS — M81.6 LOCALIZED OSTEOPOROSIS, UNSPECIFIED PATHOLOGICAL FRACTURE PRESENCE: Primary | ICD-10-CM

## 2022-03-10 NOTE — TELEPHONE ENCOUNTER
Code number is not correct for the patients bone density test   Please resubmit the information and send it to scheduling at [phone]

## 2022-03-10 NOTE — TELEPHONE ENCOUNTER
Called and spoke to patient regarding diabetic planning. A1C 12. 6. medications reviewed, lantus 75 units bid, trulicity 3.9HF Z6J, and metformin ER 2 tabs BID. Patient states she does not watch her diet and the only exercise she gets is while she is working as a  at [employer]. Encourage patient to eliminate sweets from her diet. Blood sugars are ranging from 156-200 fasting. She states Dr. Monica García is helping her adjust insulin. Will follow up with a phone call in 2 weeks.  Appt made for 3 month follow up 5/31/22

## 2022-03-17 ENCOUNTER — OFFICE VISIT (OUTPATIENT)
Dept: NEUROLOGY | Age: 67
End: 2022-03-17

## 2022-03-17 DIAGNOSIS — G40.909 SEIZURE DISORDER (HCC): Primary | ICD-10-CM

## 2022-03-18 PROBLEM — H54.8 LEGAL BLINDNESS, AS DEFINED IN UNITED STATES OF AMERICA: Status: ACTIVE | Noted: 2018-10-24

## 2022-03-20 PROBLEM — E11.65 UNCONTROLLED TYPE 2 DIABETES MELLITUS WITH HYPERGLYCEMIA (HCC): Status: ACTIVE | Noted: 2019-08-15

## 2022-03-27 NOTE — PROCEDURES
EEG:      Date:  03/17/22    Requesting Physician:  Abril Spencer MD     An EEG is requested in this 42-year-old lady to evaluate for epileptiform abnormality. Medications:  Medications are said to include Depakote, Fioricet, Paxil, Neurontin, Glucophage, Omeprazole, prednisone. This tracing is obtained during the awake state. During wakefulness there are brief intermittent runs of posteriorly dominant and symmetric low to medium amplitude 10-11 cycle per second activities, which attenuate with eye opening. Lower voltage, faster frequency activities are seen symmetrically over the anterior head regions. Hyperventilation is not performed. Intermittent photic stimulation little alters the tracing. Sleep is not attained. Interpretation:   This EEG recorded during the awake state is normal.

## 2022-03-29 ENCOUNTER — TELEPHONE (OUTPATIENT)
Dept: FAMILY MEDICINE CLINIC | Age: 67
End: 2022-03-29

## 2022-03-29 ENCOUNTER — DOCUMENTATION ONLY (OUTPATIENT)
Dept: FAMILY MEDICINE CLINIC | Age: 67
End: 2022-03-29

## 2022-03-29 NOTE — PROGRESS NOTES
[address] request for pneumatic compression device was put on [facility] desk to process

## 2022-03-29 NOTE — TELEPHONE ENCOUNTER
Patient returned call. Diabetes planning follow up. Patient lantus increase to 75 units bid by Dr. Eleazar Dodd during office visit. bs ranging about 150. ( they were 150-20)  One low reading yesterday fasting was 84. Patient and I discussed diet, decreasing sweets and fruits added to diet. She is increasing her hours at work , as a . Patient has numerous tests scheduled, eye exam 5/15, MRI 4/6/22, mammogram and bone density test in April, 22 and she got a letter from GI stating she is due for colonoscopy. She will call them and schedule soon. Will f/u call in 2 weeks to check on progress. Next appointment due in June, not  scheduled yet. We will schedule that on next call in 2 weeks.

## 2022-03-31 ENCOUNTER — HOSPITAL ENCOUNTER (OUTPATIENT)
Dept: MAMMOGRAPHY | Age: 67
Discharge: HOME | End: 2022-03-31
Payer: MEDICARE

## 2022-03-31 DIAGNOSIS — M81.6 LOCALIZED OSTEOPOROSIS, UNSPECIFIED PATHOLOGICAL FRACTURE PRESENCE: ICD-10-CM

## 2022-03-31 DIAGNOSIS — Z12.31 VISIT FOR SCREENING MAMMOGRAM: ICD-10-CM

## 2022-03-31 PROCEDURE — 77080 DXA BONE DENSITY AXIAL: CPT

## 2022-03-31 PROCEDURE — 77063 BREAST TOMOSYNTHESIS BI: CPT

## 2022-04-06 ENCOUNTER — HOSPITAL ENCOUNTER (OUTPATIENT)
Dept: MRI IMAGING | Age: 67
Discharge: HOME | End: 2022-04-06
Payer: MEDICARE

## 2022-04-06 ENCOUNTER — APPOINTMENT (OUTPATIENT)
Dept: NEUROLOGY | Age: 67
End: 2022-04-06
Payer: MEDICARE

## 2022-04-06 DIAGNOSIS — R41.3 MEMORY LOSS: ICD-10-CM

## 2022-04-06 DIAGNOSIS — G43.919 INTRACTABLE MIGRAINE WITHOUT STATUS MIGRAINOSUS, UNSPECIFIED MIGRAINE TYPE: ICD-10-CM

## 2022-04-06 PROCEDURE — 70551 MRI BRAIN STEM W/O DYE: CPT

## 2022-04-06 NOTE — PROGRESS NOTES
Called and LVM for Patient. Shiloh Forde Requested call back to inform patient of Mammogram result per Dr Lynn: Normal mammogram

## 2022-04-07 NOTE — PROGRESS NOTES
A letter was sent to the patient at the address on file, with lab results and Dr. Sidney Morfin recommendations for the patient.

## 2022-04-14 ENCOUNTER — TELEPHONE (OUTPATIENT)
Dept: FAMILY MEDICINE CLINIC | Age: 67
End: 2022-04-14

## 2022-06-26 ENCOUNTER — HOSPITAL ENCOUNTER (EMERGENCY)
Age: 67
Discharge: HOME | End: 2022-06-26
Attending: STUDENT IN AN ORGANIZED HEALTH CARE EDUCATION/TRAINING PROGRAM
Payer: MEDICARE

## 2022-06-26 ENCOUNTER — APPOINTMENT (OUTPATIENT)
Dept: GENERAL RADIOLOGY | Age: 67
End: 2022-06-26
Attending: STUDENT IN AN ORGANIZED HEALTH CARE EDUCATION/TRAINING PROGRAM
Payer: MEDICARE

## 2022-06-26 ENCOUNTER — APPOINTMENT (OUTPATIENT)
Dept: CT IMAGING | Age: 67
End: 2022-06-26
Attending: STUDENT IN AN ORGANIZED HEALTH CARE EDUCATION/TRAINING PROGRAM
Payer: MEDICARE

## 2022-06-26 VITALS
WEIGHT: 150 LBS | HEART RATE: 94 BPM | TEMPERATURE: 97.7 F | DIASTOLIC BLOOD PRESSURE: 83 MMHG | OXYGEN SATURATION: 98 % | HEIGHT: 60 IN | SYSTOLIC BLOOD PRESSURE: 114 MMHG | RESPIRATION RATE: 18 BRPM | BODY MASS INDEX: 29.45 KG/M2

## 2022-06-26 DIAGNOSIS — M25.551 RIGHT HIP PAIN: ICD-10-CM
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
## 2022-06-26 DIAGNOSIS — M79.604 RIGHT LEG PAIN: ICD-10-CM

## 2022-06-26 DIAGNOSIS — S80.11XA CONTUSION OF RIGHT LEG, INITIAL ENCOUNTER: ICD-10-CM

## 2022-06-26 PROCEDURE — 73552 X-RAY EXAM OF FEMUR 2/>: CPT

## 2022-06-26 PROCEDURE — 73010 X-RAY EXAM OF SHOULDER BLADE: CPT

## 2022-06-26 PROCEDURE — 72192 CT PELVIS W/O DYE: CPT

## 2022-06-26 PROCEDURE — 74011250637 HC RX REV CODE- 250/637: Performed by: STUDENT IN AN ORGANIZED HEALTH CARE EDUCATION/TRAINING PROGRAM

## 2022-06-26 PROCEDURE — 71046 X-RAY EXAM CHEST 2 VIEWS: CPT

## 2022-06-26 PROCEDURE — 73590 X-RAY EXAM OF LOWER LEG: CPT

## 2022-06-26 PROCEDURE — 99284 EMERGENCY DEPT VISIT MOD MDM: CPT

## 2022-06-26 RX ORDER — CYCLOBENZAPRINE HCL 10 MG
5 TABLET ORAL
Status: COMPLETED | OUTPATIENT
Start: 2022-06-26 | End: 2022-06-26

## 2022-06-26 RX ORDER — ACETAMINOPHEN 500 MG
1000 TABLET ORAL
Qty: 20 TABLET | Refills: 0 | Status: SHIPPED | OUTPATIENT
Start: 2022-06-26

## 2022-06-26 RX ORDER — IBUPROFEN 600 MG/1
600 TABLET ORAL
Qty: 20 TABLET | Refills: 0 | Status: SHIPPED | OUTPATIENT
Start: 2022-06-26 | End: 2022-07-03

## 2022-06-26 RX ORDER — ACETAMINOPHEN 500 MG
1000 TABLET ORAL ONCE
Status: COMPLETED | OUTPATIENT
Start: 2022-06-26 | End: 2022-06-26

## 2022-06-26 RX ORDER — IBUPROFEN 600 MG/1
600 TABLET ORAL
Status: COMPLETED | OUTPATIENT
Start: 2022-06-26 | End: 2022-06-26

## 2022-06-26 RX ADMIN — IBUPROFEN 600 MG: 600 TABLET ORAL at 12:42

## 2022-06-26 RX ADMIN — CYCLOBENZAPRINE 5 MG: 10 TABLET, FILM COATED ORAL at 12:38

## 2022-06-26 RX ADMIN — ACETAMINOPHEN 1000 MG: 500 TABLET ORAL at 12:43

## 2022-06-26 NOTE — ED TRIAGE NOTES
Pt presents to ed via ems a&ox3, no acute distress, breaths even and unlabored c/o R hip and LE pain s/p stepping in hole yesterday and fell onto her R side. As per ems, pt was ambulatory on scene. Pt able to goldman without difficulty, just with pain. Denies hitting head. Denies thinners.

## 2022-06-26 NOTE — ED PROVIDER NOTES
HPI     Patient is a 54-year-old female who presents today for right sided pain. Patient is that she stepped into a hole yesterday and fell onto her right side. She was ambulatory on the scene EMS. She is having pain in her right leg, right hip, as well as her right upper back. She denies hitting her head. She says she has tried pain medication without much relief. She presented to the ED for further evaluation.     Past Medical History:   Diagnosis Date    Diabetic peripheral neuropathy associated with type 2 diabetes mellitus (HCC)     Dyslipidemia     Hx of migraines     Hyperlipidemia     Type II or unspecified type diabetes mellitus without mention of complication, uncontrolled     Unspecified epilepsy without mention of intractable epilepsy     Vision decreased        Past Surgical History:   Procedure Laterality Date    COLONOSCOPY N/A 1/30/2019    COLONOSCOPY performed by Joseph Jimenez MD at [address] [place] [address] HX CATARACT REMOVAL Bilateral     HX COLONOSCOPY      HX ENDOSCOPY  04/2016    EGD, stretched her esopaghus, recurrent issue     HX GYN Bilateral     tubaligation    HX ORTHOPAEDIC      both knees, L shoulder     HX REFRACTIVE SURGERY  02/05/2012    right eye    HX RETINAL DETACHMENT REPAIR Bilateral          Family History:   Problem Relation Age of Onset    Cancer Mother     Cancer Other     Heart Disease Father        Social History     Socioeconomic History    Marital status:      Spouse name: Not on file    Number of children: Not on file    Years of education: Not on file    Highest education level: Not on file   Occupational History    Not on file   Tobacco Use    Smoking status: Never Smoker    Smokeless tobacco: Never Used   Substance and Sexual Activity    Alcohol use: No    Drug use: Not on file    Sexual activity: Never     Partners: Male   Other Topics Concern    Not on file   Social History Narrative    Not on file     Social Determinants of Health Financial Resource Strain:     Difficulty of Paying Living Expenses: Not on file   Food Insecurity:     Worried About Running Out of Food in the Last Year: Not on file    Dipesh of Food in the Last Year: Not on file   Transportation Needs:     Lack of Transportation (Medical): Not on file    Lack of Transportation (Non-Medical): Not on file   Physical Activity:     Days of Exercise per Week: Not on file    Minutes of Exercise per Session: Not on file   Stress:     Feeling of Stress : Not on file   Social Connections:     Frequency of Communication with Friends and Family: Not on file    Frequency of Social Gatherings with Friends and Family: Not on file    Attends [employer] Services: Not on file    Active Member of [address] [place] [address] or Organizations: Not on file    Attends Club or Organization Meetings: Not on file    Marital Status: Not on file   Intimate Partner Violence:     Fear of Current or Ex-Partner: Not on file    Emotionally Abused: Not on file    Physically Abused: Not on file    Sexually Abused: Not on file   Housing Stability:     Unable to Pay for Housing in the Last Year: Not on file    Number of [place] in the Last Year: Not on file    Unstable Housing in the Last Year: Not on file         ALLERGIES: Flexall [menthol-aloe vera extract] and Sudafed [pseudoephedrine hcl]    Review of Systems   Musculoskeletal:        Right hip pain, right upper back pain, right thigh pain, right leg pain. Vitals:    06/26/22 1204   BP: 122/70   Pulse: 94   Resp: 18   Temp: 97.7 °F (36.5 °C)   SpO2: 97%   Weight: 68 kg (150 lb)   Height: 5' (1.524 m)            Physical Exam  Vitals and nursing note reviewed. Constitutional:       General: She is not in acute distress. Appearance: Normal appearance. HENT:      Head: Normocephalic and atraumatic. Nose: Nose normal.      Mouth/Throat:      Mouth: Mucous membranes are moist.      Pharynx: Oropharynx is clear.    Eyes:      Extraocular Movements: Extraocular movements intact. Conjunctiva/sclera: Conjunctivae normal.      Pupils: Pupils are equal, round, and reactive to light. Cardiovascular:      Rate and Rhythm: Normal rate and regular rhythm. Pulses: Normal pulses. Heart sounds: Normal heart sounds. No murmur heard. No friction rub. No gallop. Pulmonary:      Effort: Pulmonary effort is normal.      Breath sounds: Normal breath sounds. Abdominal:      General: Bowel sounds are normal. There is no distension. Palpations: Abdomen is soft. Tenderness: There is no abdominal tenderness. Musculoskeletal:      Cervical back: Normal range of motion. Back:       Right hip: Decreased range of motion. Right upper leg: Tenderness present. Right lower leg: Tenderness present. Skin:     General: Skin is warm and dry. Capillary Refill: Capillary refill takes less than 2 seconds. Neurological:      General: No focal deficit present. Mental Status: She is alert and oriented to person, place, and time. Mental status is at baseline. Psychiatric:         Mood and Affect: Mood normal.          [facility]        MEDICAL DECISION MAKIN y.o. female presents with Hip Pain    Is a 54-year-old female who presents today with fall. Patient fell in a hole yesterday. Patient has been having pain to her right side, most prominent to the right leg and hip since yesterday. X-rays ordered including x-ray of the chest, right scapula, as well as x-ray of the right femur and right lower extremity. CT pelvis also ordered. 7:00 Turnover to Dr. Ellis Yung pending imaging and reassessment.       LABORATORY TESTS:  Labs Reviewed - No data to display    IMAGING RESULTS:  CT PELV WO CONT    (Results Pending)   XR TIB/FIB RT    (Results Pending)   XR FEMUR RT 2 VS    (Results Pending)   XR CHEST PA LAT    (Results Pending)   XR SCAPULA RT    (Results Pending)       MEDICATIONS GIVEN:  Medications   cyclobenzaprine (FLEXERIL) tablet 5 mg (5 mg Oral Given 6/26/22 1238)   acetaminophen (TYLENOL) tablet 1,000 mg (1,000 mg Oral Given 6/26/22 1243)   ibuprofen (MOTRIN) tablet 600 mg (600 mg Oral Given 6/26/22 1242)            IMPRESSION:  1. Fall, initial encounter    2. Right leg pain    3.  Right hip pain        Ifeoma Johnson MD        Procedures

## 2022-08-10 NOTE — ED NOTES
Change of shift. Care of patient taken over from Dr Janette Ramachandran; H&P reviewed, bedside handoff complete. Awaiting imaging. Imaging without acute traumatic findings. Provided instructions for supportive care measures. Plan to follow up with PCP as needed and return precautions discussed for worsening or new concerning symptoms.

## 2022-08-20 DIAGNOSIS — G40.219 PARTIAL EPILEPSY WITH IMPAIRMENT OF CONSCIOUSNESS, INTRACTABLE (HCC): ICD-10-CM

## 2022-08-22 RX ORDER — GABAPENTIN 300 MG/1
CAPSULE ORAL
Qty: 30 CAPSULE | Refills: 5 | Status: SHIPPED | OUTPATIENT
Start: 2022-08-22

## 2022-09-23 NOTE — PROGRESS NOTES
Chief Complaint   Patient presents with    Diabetes    Hip Pain     Left hip    Labs     Fasting today     1. Have you been to the ER, urgent care clinic since your last visit? Hospitalized since your last visit? No    2. Have you seen or consulted any other health care providers outside of the [address] [place] [address] since your last visit? Include any pap smears or colon screening. No       Nicole Mcgill  11/14/2019  Provider:   Irish:  Diabetes Report Card   1) Have you seen the eye doctor in past year?yes    2) How would you  rate your Diabetic Diet? Not on track   3) How well do you take care of your feet? Podiatry   4) Do you keep your Primary Care Follow Up Appts? yes    5) Do you know your A1C goal?yes    6) Do you take your medications daily? yes    7) Do you check your blood sugars? yes    8) Have you gained weight?no       9) Do you follow an exercise program?no    10) Can you do better?no      Lab Results   Component Value Date/Time    Cholesterol, total 162 08/15/2019 11:44 AM    HDL Cholesterol 42 08/15/2019 11:44 AM    LDL, calculated 91 08/15/2019 11:44 AM    Triglyceride 144 08/15/2019 11:44 AM     Lab Results   Component Value Date/Time    Hemoglobin A1c 11.6 (H) 08/15/2019 11:44 AM    Hemoglobin A1c 12.9 (H) 05/29/2019 10:37 AM    Hemoglobin A1c 13.0 (H) 02/06/2019 11:57 AM    Glucose 155 (H) 08/15/2019 11:44 AM    Glucose (POC) 282 (H) 01/30/2019 02:57 PM    Microalb/Creat ratio (ug/mg creat.) 4.6 10/24/2018 10:36 AM    LDL, calculated 91 08/15/2019 11:44 AM    Creatinine 0.62 08/15/2019 11:44 AM      Lab Results   Component Value Date/Time    Microalb/Creat ratio (ug/mg creat.) 4.6 10/24/2018 10:36 AM      Chief Complaint   Patient presents with    Diabetes    Hip Pain     Left hip    Labs     Fasting today     she is a 71y.o. year old female who presents for evaluation. See Diabetic Report Card listed above.      Patient Active Problem List    Diagnosis    Uncontrolled type 2 diabetes mellitus with hyperglycemia ([facility]    Legal blindness, as defined in [place]    Severe obesity (BMI 35.0-39. 9)    Type II or unspecified type diabetes mellitus without mention of complication, uncontrolled    Hyperlipidemia    Diabetic peripheral neuropathy associated with type 2 diabetes mellitus (HCC)    Post concussive syndrome    Partial epilepsy with impairment of consciousness, intractable (HCC)    Intractable migraine without aura    Headache(784.0)       Reviewed PmHx, RxHx, FmHx, SocHx, AllgHx--dated and updated in the chart. Review of Systems - negative except as listed above in the HPI    Objective:     Vitals:    11/14/19 1002   BP: 120/80   Pulse: (!) 101   Resp: 16   Temp: 97.9 °F (36.6 °C)   TempSrc: Oral   SpO2: 95%   Weight: 169 lb (76.7 kg)   Height: 5' 1\" (1.549 m)     Physical Examination: General appearance - alert, well appearing, and in no distress  Neck - supple, no significant adenopathy  Chest - clear to auscultation, no wheezes, rales or rhonchi, symmetric air entry  Heart - normal rate, regular rhythm, normal S1, S2, no murmurs, rubs, clicks or gallops  Abdomen - soft, nontender, nondistended, no masses or organomegaly    Assessment/ Plan:   Diagnoses and all orders for this visit:    1. Diabetic peripheral neuropathy associated with type 2 diabetes mellitus ([facility]  -     LIPID PANEL; Future  -     METABOLIC PANEL, COMPREHENSIVE; Future  -     HEMOGLOBIN A1C WITH EAG; Future  -     insulin glargine (LANTUS,BASAGLAR) 100 unit/mL (3 mL) inpn; 100 Units by SubCUTAneous route daily for 90 days.  -     TSH 3RD GENERATION; Future  -     CBC WITH AUTOMATED DIFF; Future  -     MICROALBUMIN, UR, RAND W/ MICROALB/CREAT RATIO; Future  -not at goal  -taper as per message     2. Uncontrolled type 2 diabetes mellitus with hyperglycemia (HCC)  -     LIPID PANEL; Future  -     METABOLIC PANEL, COMPREHENSIVE; Future  -     HEMOGLOBIN A1C WITH EAG;  Future  -     insulin glargine (LANTUS,BASAGLAR) 100 unit/mL (3 mL) inpn; 100 Units by SubCUTAneous route daily for 90 days. -     dulaglutide (TRULICITY) 1.5 GK/9.6 mL sub-q pen; 0.5 mL by SubCUTAneous route every seven (7) days.  -     TSH 3RD GENERATION; Future  -     CBC WITH AUTOMATED DIFF; Future  -     MICROALBUMIN, UR, RAND W/ MICROALB/CREAT RATIO; Future    3. Pure hypercholesterolemia  -     LIPID PANEL; Future  -     METABOLIC PANEL, COMPREHENSIVE; Future       Follow-up and Dispositions    · Return in about 3 months (around 2/14/2020). Lab Results   Component Value Date/Time    Cholesterol, total 162 08/15/2019 11:44 AM    HDL Cholesterol 42 08/15/2019 11:44 AM    LDL, calculated 91 08/15/2019 11:44 AM    Triglyceride 144 08/15/2019 11:44 AM     Lab Results   Component Value Date/Time    Hemoglobin A1c 11.6 (H) 08/15/2019 11:44 AM    Hemoglobin A1c 12.9 (H) 05/29/2019 10:37 AM    Hemoglobin A1c 13.0 (H) 02/06/2019 11:57 AM    Microalb/Creat ratio (ug/mg creat.) 4.6 10/24/2018 10:36 AM    LDL, calculated 91 08/15/2019 11:44 AM    Creatinine 0.62 08/15/2019 11:44 AM          Discussed with patient goal of Diabetes to include:  HgA1C <7, LDL cholesterol <100, Blood pressure <140/80. Discussed with patient diet and weight management and to get regular exercise. Recommend yearly eye exams and daily foot care. The patient understands and agrees with the plan. I have discussed the diagnosis with the patient and the intended plan as seen in the above orders. The patient has received an after-visit summary and questions were answered concerning future plans. Medication Side Effects and Warnings were discussed with patient  Patient Labs were reviewed and or requested  Patient Past Records were reviewed and or requested    Fiona Marshall M.D. [address] [facility]    Patient Instructions   Taper insulin up 5 units every 3 days until your fasting sugars average in the 100's. Helical Rim Advancement Flap Text: The defect edges were debeveled with a #15 blade scalpel.  Given the location of the defect and the proximity to free margins (helical rim) a double helical rim advancement flap was deemed most appropriate.  Using a sterile surgical marker, the appropriate advancement flaps were drawn incorporating the defect and placing the expected incisions between the helical rim and antihelix where possible.  The area thus outlined was incised through and through with a #15 scalpel blade.  With a skin hook and iris scissors, the flaps were gently and sharply undermined and freed up.

## 2022-10-01 ENCOUNTER — HOSPITAL ENCOUNTER (EMERGENCY)
Age: 67
Discharge: HOME | End: 2022-10-01
Attending: EMERGENCY MEDICINE
Payer: MEDICARE

## 2022-10-01 ENCOUNTER — APPOINTMENT (OUTPATIENT)
Dept: CT IMAGING | Age: 67
End: 2022-10-01
Attending: EMERGENCY MEDICINE
Payer: MEDICARE

## 2022-10-01 VITALS
SYSTOLIC BLOOD PRESSURE: 142 MMHG | DIASTOLIC BLOOD PRESSURE: 74 MMHG | HEART RATE: 95 BPM | RESPIRATION RATE: 18 BRPM | HEIGHT: 60 IN | OXYGEN SATURATION: 98 % | TEMPERATURE: 98.2 F | WEIGHT: 150 LBS | BODY MASS INDEX: 29.45 KG/M2

## 2022-10-01 DIAGNOSIS — N39.0 URINARY TRACT INFECTION WITHOUT HEMATURIA, SITE UNSPECIFIED: Primary | ICD-10-CM

## 2022-10-01 DIAGNOSIS — K59.00 CONSTIPATION, UNSPECIFIED CONSTIPATION TYPE: ICD-10-CM

## 2022-10-01 LAB
APPEARANCE UR: CLEAR
BACTERIA URNS QL MICRO: ABNORMAL /HPF
BILIRUB UR QL: NEGATIVE
COLOR UR: ABNORMAL
EPITH CASTS URNS QL MICRO: ABNORMAL /LPF
GLUCOSE UR STRIP.AUTO-MCNC: >1000 MG/DL
HGB UR QL STRIP: NEGATIVE
KETONES UR QL STRIP.AUTO: NEGATIVE MG/DL
LEUKOCYTE ESTERASE UR QL STRIP.AUTO: NEGATIVE
NITRITE UR QL STRIP.AUTO: POSITIVE
PH UR STRIP: 5.5 [PH] (ref 5–8)
PROT UR STRIP-MCNC: NEGATIVE MG/DL
RBC #/AREA URNS HPF: ABNORMAL /HPF
SP GR UR REFRACTOMETRY: 1.01 (ref 1–1.03)
UR CULT HOLD, URHOLD: NORMAL
UROBILINOGEN UR QL STRIP.AUTO: 0.2 EU/DL (ref 0.2–1)
WBC URNS QL MICRO: ABNORMAL /HPF (ref 0–4)

## 2022-10-01 PROCEDURE — 74176 CT ABD & PELVIS W/O CONTRAST: CPT

## 2022-10-01 PROCEDURE — 99284 EMERGENCY DEPT VISIT MOD MDM: CPT

## 2022-10-01 PROCEDURE — 81001 URINALYSIS AUTO W/SCOPE: CPT

## 2022-10-01 RX ORDER — POLYETHYLENE GLYCOL 3350 17 G/17G
17 POWDER, FOR SOLUTION ORAL DAILY
Qty: 116 G | Refills: 0 | Status: SHIPPED | OUTPATIENT
Start: 2022-10-01 | End: 2022-10-08

## 2022-10-01 RX ORDER — FLUCONAZOLE 100 MG/1
100 TABLET ORAL ONCE
Qty: 1 TABLET | Refills: 0 | Status: SHIPPED | OUTPATIENT
Start: 2022-10-08 | End: 2022-10-08

## 2022-10-01 RX ORDER — CEPHALEXIN 500 MG/1
500 CAPSULE ORAL 4 TIMES DAILY
Qty: 28 CAPSULE | Refills: 0 | Status: SHIPPED | OUTPATIENT
Start: 2022-10-01 | End: 2022-10-08

## 2022-10-01 RX ORDER — PHENAZOPYRIDINE HYDROCHLORIDE 200 MG/1
200 TABLET, FILM COATED ORAL 3 TIMES DAILY
Qty: 6 TABLET | Refills: 0 | Status: SHIPPED | OUTPATIENT
Start: 2022-10-01 | End: 2022-10-03

## 2022-10-01 NOTE — ED TRIAGE NOTES
Pt to ER with c/o pelvic pain and urinary burning and frequency since Monday. Pt took otc urinary medication with no relief.

## 2022-10-01 NOTE — Clinical Note
[address]  [facility] EMERGENCY DEPT  [address] [facility]  [place] [id number] [phone]    Work/School Note    Date: 10/1/2022    To Whom It May concern:      Palmira Naik was seen and treated today in the emergency room by the following provider(s):  Attending Provider: Oneida Miller MD.      Palmira Naik is excused from work/school on 10/01/22. She is clear to return to work/school on 10/02/22.         Sincerely,          Bola Tillman MD

## 2022-10-01 NOTE — ED PROVIDER NOTES
69-year-old female with a history of diabetes, hypertension, hyperlipidemia presents with dysuria and suprapubic abdominal pain. Patient does not describe the pain or rated on a scale. She states that her symptoms of been ongoing for the last 5 days. She also endorses dysuria without hematuria.        Past Medical History:   Diagnosis Date    Diabetic peripheral neuropathy associated with type 2 diabetes mellitus (HCC)     Dyslipidemia     Hx of migraines     Hyperlipidemia     Type II or unspecified type diabetes mellitus without mention of complication, uncontrolled     Unspecified epilepsy without mention of intractable epilepsy     Vision decreased        Past Surgical History:   Procedure Laterality Date    COLONOSCOPY N/A 1/30/2019    COLONOSCOPY performed by Wilman Ivy MD at [facility] ENDOSCOPY    HX CATARACT REMOVAL Bilateral     HX COLONOSCOPY      HX ENDOSCOPY  04/2016    EGD, stretched her esopaghus, recurrent issue     HX GYN Bilateral     tubaligation    HX ORTHOPAEDIC      both knees, L shoulder     HX REFRACTIVE SURGERY  02/05/2012    right eye    HX RETINAL DETACHMENT REPAIR Bilateral          Family History:   Problem Relation Age of Onset    Cancer Mother     Cancer Other     Heart Disease Father        Social History     Socioeconomic History    Marital status:      Spouse name: Not on file    Number of children: Not on file    Years of education: Not on file    Highest education level: Not on file   Occupational History    Not on file   Tobacco Use    Smoking status: Never    Smokeless tobacco: Never   Substance and Sexual Activity    Alcohol use: No    Drug use: Not on file    Sexual activity: Never     Partners: Male   Other Topics Concern    Not on file   Social History Narrative    Not on file     Social Determinants of Health     Financial Resource Strain: Not on file   Food Insecurity: Not on file   Transportation Needs: Not on file   Physical Activity: Not on file   Stress: Not on file   Social Connections: Not on file   Intimate Partner Violence: Not on file   Housing Stability: Not on file         ALLERGIES: Flexall [menthol-aloe vera extract] and Sudafed [pseudoephedrine hcl]    Review of Systems   Constitutional:  Negative for fever. HENT:  Negative for rhinorrhea. Respiratory:  Negative for shortness of breath. Cardiovascular:  Negative for chest pain. Gastrointestinal:  Positive for abdominal pain. Genitourinary:  Negative for dysuria. Musculoskeletal:  Negative for back pain. Skin:  Negative for wound. Neurological:  Negative for headaches. Psychiatric/Behavioral:  Negative for confusion. Vitals:    10/01/22 1454   BP: (!) 147/76   Pulse: (!) 102   Resp: 16   Temp: 98.2 °F (36.8 °C)   SpO2: 97%   Weight: 68 kg (150 lb)   Height: 5' (1.524 m)            Physical Exam  Vitals and nursing note reviewed. Constitutional:       General: She is not in acute distress. Appearance: Normal appearance. She is not ill-appearing, toxic-appearing or diaphoretic. HENT:      Head: Normocephalic and atraumatic. Eyes:      Extraocular Movements: Extraocular movements intact. Cardiovascular:      Rate and Rhythm: Normal rate. Pulses: Normal pulses. Pulmonary:      Effort: Pulmonary effort is normal. No respiratory distress. Abdominal:      General: There is no distension. Musculoskeletal:         General: Normal range of motion. Cervical back: Normal range of motion. Skin:     General: Skin is dry. Neurological:      Mental Status: She is alert and oriented to person, place, and time. Psychiatric:         Mood and Affect: Mood normal.        MDM  Number of Diagnoses or Management Options  Constipation, unspecified constipation type  Urinary tract infection without hematuria, site unspecified  Diagnosis management comments:     21-year-old female presents with dysuria and suprapubic pain. Urinalysis is nitrite positive with +1 bacteria.   We will treat for UTI. CT obtained to evaluate for ureterolithiasis. CT shows significant fecal stasis but is otherwise unremarkable. Recommended over-the-counter laxatives. Discussed my clinical impression(s), any labs and/or radiology results with the patient. I answered any questions and addressed any concerns. Discussed the importance of following up with their primary care physician and/or specialist(s). Discussed signs or symptoms that would warrant return back to the ER for further evaluation. The patient is agreeable with discharge.            Procedures

## 2022-10-02 DIAGNOSIS — G43.919 INTRACTABLE MIGRAINE WITHOUT STATUS MIGRAINOSUS, UNSPECIFIED MIGRAINE TYPE: ICD-10-CM

## 2022-10-03 RX ORDER — BUTALBITAL, ACETAMINOPHEN, CAFFEINE AND CODEINE PHOSPHATE 50; 325; 40; 30 MG/1; MG/1; MG/1; MG/1
CAPSULE ORAL
Qty: 40 CAPSULE | Refills: 0 | Status: SHIPPED | OUTPATIENT
Start: 2022-10-03 | End: 2022-11-02

## 2022-12-21 ENCOUNTER — NURSE TRIAGE (OUTPATIENT)
Dept: OTHER | Facility: CLINIC | Age: 67
End: 2022-12-21

## 2022-12-21 ENCOUNTER — OFFICE VISIT (OUTPATIENT)
Dept: FAMILY MEDICINE CLINIC | Age: 67
End: 2022-12-21
Payer: MEDICARE

## 2022-12-21 VITALS
WEIGHT: 146 LBS | DIASTOLIC BLOOD PRESSURE: 82 MMHG | SYSTOLIC BLOOD PRESSURE: 139 MMHG | HEIGHT: 60 IN | HEART RATE: 77 BPM | OXYGEN SATURATION: 97 % | BODY MASS INDEX: 28.66 KG/M2 | TEMPERATURE: 98.6 F | RESPIRATION RATE: 18 BRPM

## 2022-12-21 DIAGNOSIS — G43.719 INTRACTABLE CHRONIC MIGRAINE WITHOUT AURA AND WITHOUT STATUS MIGRAINOSUS: ICD-10-CM

## 2022-12-21 DIAGNOSIS — G43.809 CERVICOGENIC MIGRAINE: ICD-10-CM

## 2022-12-21 DIAGNOSIS — J06.9 ACUTE UPPER RESPIRATORY INFECTION: Primary | ICD-10-CM

## 2022-12-21 DIAGNOSIS — M62.838 MUSCLE SPASM: ICD-10-CM

## 2022-12-21 DIAGNOSIS — E11.42 DIABETIC PERIPHERAL NEUROPATHY ASSOCIATED WITH TYPE 2 DIABETES MELLITUS (HCC): ICD-10-CM

## 2022-12-21 DIAGNOSIS — E11.65 UNCONTROLLED TYPE 2 DIABETES MELLITUS WITH HYPERGLYCEMIA (HCC): ICD-10-CM

## 2022-12-21 DIAGNOSIS — E78.00 PURE HYPERCHOLESTEROLEMIA: ICD-10-CM

## 2022-12-21 DIAGNOSIS — F41.9 ANXIETY: ICD-10-CM

## 2022-12-21 PROCEDURE — G0463 HOSPITAL OUTPT CLINIC VISIT: HCPCS | Performed by: FAMILY MEDICINE

## 2022-12-21 RX ORDER — BENZONATATE 100 MG/1
100 CAPSULE ORAL
Qty: 60 CAPSULE | Refills: 1 | Status: SHIPPED | OUTPATIENT
Start: 2022-12-21 | End: 2022-12-28

## 2022-12-21 RX ORDER — DULAGLUTIDE 1.5 MG/.5ML
1.5 INJECTION, SOLUTION SUBCUTANEOUS
Qty: 12 EACH | Refills: 1 | Status: SHIPPED | OUTPATIENT
Start: 2022-12-21

## 2022-12-21 RX ORDER — CETIRIZINE HYDROCHLORIDE 10 MG/1
10 TABLET ORAL DAILY
Qty: 30 TABLET | Refills: 5 | Status: SHIPPED | OUTPATIENT
Start: 2022-12-21

## 2022-12-21 RX ORDER — ALPRAZOLAM 1 MG/1
TABLET ORAL
Qty: 30 TABLET | Refills: 0 | Status: SHIPPED | OUTPATIENT
Start: 2022-12-21

## 2022-12-21 RX ORDER — ROSUVASTATIN CALCIUM 40 MG/1
40 TABLET, COATED ORAL
Qty: 90 TABLET | Refills: 1 | Status: SHIPPED | OUTPATIENT
Start: 2022-12-21

## 2022-12-21 RX ORDER — CYCLOBENZAPRINE HCL 10 MG
10 TABLET ORAL
Qty: 180 TABLET | Refills: 1 | Status: SHIPPED | OUTPATIENT
Start: 2022-12-21

## 2022-12-21 RX ORDER — AZITHROMYCIN 250 MG/1
TABLET, FILM COATED ORAL
Qty: 6 TABLET | Refills: 0 | Status: SHIPPED | OUTPATIENT
Start: 2022-12-21

## 2022-12-21 RX ORDER — PEN NEEDLE, DIABETIC 30 GX3/16"
NEEDLE, DISPOSABLE MISCELLANEOUS
Qty: 100 EACH | Refills: 5 | Status: SHIPPED | OUTPATIENT
Start: 2022-12-21

## 2022-12-21 RX ORDER — INSULIN GLARGINE 100 [IU]/ML
75 INJECTION, SOLUTION SUBCUTANEOUS 2 TIMES DAILY
Qty: 45 EACH | Refills: 1 | Status: SHIPPED | OUTPATIENT
Start: 2022-12-21 | End: 2024-10-02

## 2022-12-21 RX ORDER — DIVALPROEX SODIUM 250 MG/1
TABLET, DELAYED RELEASE ORAL
Qty: 180 TABLET | Refills: 5 | Status: SHIPPED | OUTPATIENT
Start: 2022-12-21

## 2022-12-21 NOTE — PROGRESS NOTES
Patient here for dry cough x 1 week. Headache . Left eye drainage. Patient also needs refills. 1. Have you been to the ER, urgent care clinic since your last visit? Hospitalized since your last visit? No    2. Have you seen or consulted any other health care providers outside of the [address] [place] [address] since your last visit? Include any pap smears or colon screening.  No

## 2022-12-21 NOTE — TELEPHONE ENCOUNTER
Location of patient: [address] herrera from [facility] with [employer]. Subjective: Caller states \"I've been coughing for a week\"     Current Symptoms: Has a non productive cough, no chest pain, gets short of breath with coughing fits. Not currently short of breath. Has some weakness, head congestion and headache and now left eye is runny. No asthma, COPD, no clot history. Has DM, overdue for follow up. Onset: 1 week ago;     Associated Symptoms: NA    Pain Severity: moderate headache pain; constant    Temperature: no fever     What has been tried: robitussin, dayquil    LMP: NA Pregnant: NA    Recommended disposition: See in Office Today or Tomorrow    Care advice provided, patient verbalizes understanding; denies any other questions or concerns; instructed to call back for any new or worsening symptoms. Patient/Caller agrees with recommended disposition; writer provided warm transfer to [facility] for appointment scheduling    Attention Provider: Thank you for allowing me to participate in the care of your patient. The patient was connected to triage in response to information provided to the [facility]. Please do not respond through this encounter as the response is not directed to a shared pool.       Reason for Disposition   Patient wants to be seen    Protocols used: Cough-ADULT-[place]

## 2022-12-21 NOTE — PROGRESS NOTES
Patient here for dry cough x 1 week. Headache . Left eye drainage. Patient also needs refills. 1. Have you been to the ER, urgent care clinic since your last visit? Hospitalized since your last visit? No    2. Have you seen or consulted any other health care providers outside of the [address] [place] [address] since your last visit? Include any pap smears or colon screening. No           Chief Complaint   Patient presents with    Cough     X 1 week, using otc, can't sleep, dry cough     She is a 75 y.o. female who presents for evalution. Reviewed PmHx, RxHx, FmHx, SocHx, AllgHx and updated and dated in the chart. Patient Active Problem List    Diagnosis    Uncontrolled type 2 diabetes mellitus with hyperglycemia (HCC)    Legal blindness, as defined in [place]    Severe obesity (BMI 35.0-39. 9)    Type II or unspecified type diabetes mellitus without mention of complication, uncontrolled    Hyperlipidemia    Diabetic peripheral neuropathy associated with type 2 diabetes mellitus ([facility]    Post concussive syndrome    Partial epilepsy with impairment of consciousness, intractable ([facility]    Intractable migraine without aura    Headache(784.0)       Review of Systems - negative except as listed above in the HPI    Objective:     Vitals:    12/21/22 1449   BP: 139/82   Pulse: 77   Resp: 18   Temp: 98.6 °F (37 °C)   SpO2: 97%   Weight: 146 lb (66.2 kg)   Height: 5' (1.524 m)         Assessment/ Plan:   Diagnoses and all orders for this visit:    1. Acute upper respiratory infection  -     cetirizine (ZYRTEC) 10 mg tablet; Take 1 Tablet by mouth daily. -     benzonatate (Tessalon Perles) 100 mg capsule; Take 1 Capsule by mouth three (3) times daily as needed for Cough for up to 7 days. -     azithromycin (ZITHROMAX) 250 mg tablet; Take two tablets today then one tablet daily  Symptoms for 1 week with productive cough.   2. Intractable chronic migraine without aura and without status migrainosus  - divalproex DR (DEPAKOTE) 250 mg tablet; Take 3 tablets by mouth in the AM and 3 tablets by mouth QHS  -     cyclobenzaprine (FLEXERIL) 10 mg tablet; Take 1 Tablet by mouth two (2) times daily as needed for Muscle Spasm(s). 3. Muscle spasm  -     divalproex DR (DEPAKOTE) 250 mg tablet; Take 3 tablets by mouth in the AM and 3 tablets by mouth QHS  -     cyclobenzaprine (FLEXERIL) 10 mg tablet; Take 1 Tablet by mouth two (2) times daily as needed for Muscle Spasm(s). 4. Cervicogenic migraine  -     divalproex DR (DEPAKOTE) 250 mg tablet; Take 3 tablets by mouth in the AM and 3 tablets by mouth QHS  -     cyclobenzaprine (FLEXERIL) 10 mg tablet; Take 1 Tablet by mouth two (2) times daily as needed for Muscle Spasm(s). 5. Uncontrolled type 2 diabetes mellitus with hyperglycemia (HCC)  -     dulaglutide (Trulicity) 1.5 NG/5.8 mL sub-q pen; 0.5 mL by SubCUTAneous route every seven (7) days. -     insulin glargine (Lantus Solostar U-100 Insulin) 100 unit/mL (3 mL) inpn; 75 Units by SubCUTAneous route two (2) times a day. For controlled diabetes, patient overdue for labs and nonfasting. Return to office after new year for fasting lab work. 6. Diabetic peripheral neuropathy associated with type 2 diabetes mellitus (HCC)  -     insulin glargine (Lantus Solostar U-100 Insulin) 100 unit/mL (3 mL) inpn; 75 Units by SubCUTAneous route two (2) times a day. 7. Pure hypercholesterolemia  -     rosuvastatin (CRESTOR) 40 mg tablet; Take 1 Tablet by mouth nightly. 8. Anxiety  -     ALPRAZolam (Xanax) 1 mg tablet; Take as needed. Take  by mouth. Take as needed. Other orders  -     Insulin Needles, Disposable, 31 gauge x 5/16\" ndle; Using with Lantus and Trulicity, 8 per week, Dx: E11.65             I have discussed the diagnosis with the patient and the intended plan as seen in the above orders. The patient understands and agrees with the plan.  The patient has received an after-visit summary and questions were answered concerning future plans. Medication Side Effects and Warnings were discussed with patient  Patient Labs were reviewed and or requested:  Patient Past Records were reviewed and or requested    Geovanni Hobson M.D. There are no Patient Instructions on file for this visit.

## 2023-01-13 ENCOUNTER — PATIENT OUTREACH (OUTPATIENT)
Dept: CASE MANAGEMENT | Age: 68
End: 2023-01-13

## 2023-01-13 NOTE — PROGRESS NOTES
Ambulatory Care Management Note    Date/Time:  1/13/2023 9:25 AM    This patient was received as a referral from [facility]. Ambulatory Care Manager outreached to patient today to offer care management services. Introduction to self and role of care manager provided. Patient accepted care management services at this time. Follow up call scheduled at this time. Patient has Ambulatory Care Manager's contact number for any questions or concerns. Identified transportation as a barrier to appointments - her daughter takes her to appts when can. She does have reliable Internet and access to smart phone for virtual appts. She also has vision impairment. Her daughter and  read her information if she cannot clearly see it. Sending transportation resource tip sheet over [place] today.

## 2023-01-18 ENCOUNTER — OFFICE VISIT (OUTPATIENT)
Dept: NEUROLOGY | Age: 68
End: 2023-01-18
Payer: MEDICARE

## 2023-01-18 DIAGNOSIS — F32.A ANXIETY AND DEPRESSION: ICD-10-CM

## 2023-01-18 DIAGNOSIS — F07.81 POST CONCUSSIVE SYNDROME: ICD-10-CM

## 2023-01-18 DIAGNOSIS — G31.84 MILD COGNITIVE IMPAIRMENT: Primary | ICD-10-CM

## 2023-01-18 DIAGNOSIS — R41.89 COGNITIVE DECLINE: ICD-10-CM

## 2023-01-18 DIAGNOSIS — R41.3 SHORT-TERM MEMORY LOSS: ICD-10-CM

## 2023-01-18 DIAGNOSIS — F41.9 ANXIETY AND DEPRESSION: ICD-10-CM

## 2023-01-18 PROCEDURE — 1123F ACP DISCUSS/DSCN MKR DOCD: CPT | Performed by: CLINICAL NEUROPSYCHOLOGIST

## 2023-01-18 PROCEDURE — 90791 PSYCH DIAGNOSTIC EVALUATION: CPT | Performed by: CLINICAL NEUROPSYCHOLOGIST
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
## 2023-01-24 ENCOUNTER — OFFICE VISIT (OUTPATIENT)
Dept: FAMILY MEDICINE CLINIC | Age: 68
End: 2023-01-24
Payer: MEDICARE

## 2023-01-24 VITALS
TEMPERATURE: 97.7 F | DIASTOLIC BLOOD PRESSURE: 75 MMHG | WEIGHT: 149 LBS | OXYGEN SATURATION: 98 % | RESPIRATION RATE: 14 BRPM | SYSTOLIC BLOOD PRESSURE: 117 MMHG | HEIGHT: 60 IN | BODY MASS INDEX: 29.25 KG/M2 | HEART RATE: 85 BPM

## 2023-01-24 DIAGNOSIS — E78.00 PURE HYPERCHOLESTEROLEMIA: ICD-10-CM

## 2023-01-24 DIAGNOSIS — I10 ESSENTIAL HYPERTENSION: ICD-10-CM

## 2023-01-24 DIAGNOSIS — E11.65 UNCONTROLLED TYPE 2 DIABETES MELLITUS WITH HYPERGLYCEMIA (HCC): Primary | ICD-10-CM

## 2023-01-24 DIAGNOSIS — E11.42 DIABETIC PERIPHERAL NEUROPATHY ASSOCIATED WITH TYPE 2 DIABETES MELLITUS (HCC): ICD-10-CM

## 2023-01-24 DIAGNOSIS — G40.909 SEIZURE DISORDER (HCC): ICD-10-CM

## 2023-01-24 LAB
ALBUMIN SERPL-MCNC: 3.5 G/DL (ref 3.5–5)
ALBUMIN/GLOB SERPL: 1.2 (ref 1.1–2.2)
ALP SERPL-CCNC: 114 U/L (ref 45–117)
ALT SERPL-CCNC: 15 U/L (ref 12–78)
ANION GAP SERPL CALC-SCNC: 4 MMOL/L (ref 5–15)
AST SERPL-CCNC: 12 U/L (ref 15–37)
BILIRUB SERPL-MCNC: 0.3 MG/DL (ref 0.2–1)
BUN SERPL-MCNC: 11 MG/DL (ref 6–20)
BUN/CREAT SERPL: 18 (ref 12–20)
CALCIUM SERPL-MCNC: 9.1 MG/DL (ref 8.5–10.1)
CHLORIDE SERPL-SCNC: 104 MMOL/L (ref 97–108)
CHOLEST SERPL-MCNC: 270 MG/DL
CO2 SERPL-SCNC: 32 MMOL/L (ref 21–32)
CREAT SERPL-MCNC: 0.6 MG/DL (ref 0.55–1.02)
EST. AVERAGE GLUCOSE BLD GHB EST-MCNC: 298 MG/DL
GLOBULIN SER CALC-MCNC: 3 G/DL (ref 2–4)
GLUCOSE SERPL-MCNC: 84 MG/DL (ref 65–100)
HBA1C MFR BLD: 12 % (ref 4–5.6)
HDLC SERPL-MCNC: 52 MG/DL
HDLC SERPL: 5.2 (ref 0–5)
LDLC SERPL CALC-MCNC: 179.6 MG/DL (ref 0–100)
POTASSIUM SERPL-SCNC: 3.3 MMOL/L (ref 3.5–5.1)
PROT SERPL-MCNC: 6.5 G/DL (ref 6.4–8.2)
SODIUM SERPL-SCNC: 140 MMOL/L (ref 136–145)
TRIGL SERPL-MCNC: 192 MG/DL (ref ?–150)
VLDLC SERPL CALC-MCNC: 38.4 MG/DL

## 2023-01-24 PROCEDURE — G8427 DOCREV CUR MEDS BY ELIG CLIN: HCPCS | Performed by: FAMILY MEDICINE

## 2023-01-24 PROCEDURE — 1090F PRES/ABSN URINE INCON ASSESS: CPT | Performed by: FAMILY MEDICINE

## 2023-01-24 PROCEDURE — G9899 SCRN MAM PERF RSLTS DOC: HCPCS | Performed by: FAMILY MEDICINE

## 2023-01-24 PROCEDURE — G8536 NO DOC ELDER MAL SCRN: HCPCS | Performed by: FAMILY MEDICINE

## 2023-01-24 PROCEDURE — 1123F ACP DISCUSS/DSCN MKR DOCD: CPT | Performed by: FAMILY MEDICINE

## 2023-01-24 PROCEDURE — 2022F DILAT RTA XM EVC RTNOPTHY: CPT | Performed by: FAMILY MEDICINE

## 2023-01-24 PROCEDURE — 99214 OFFICE O/P EST MOD 30 MIN: CPT | Performed by: FAMILY MEDICINE

## 2023-01-24 PROCEDURE — 3046F HEMOGLOBIN A1C LEVEL >9.0%: CPT | Performed by: FAMILY MEDICINE

## 2023-01-24 PROCEDURE — 3074F SYST BP LT 130 MM HG: CPT | Performed by: FAMILY MEDICINE

## 2023-01-24 PROCEDURE — G0463 HOSPITAL OUTPT CLINIC VISIT: HCPCS | Performed by: FAMILY MEDICINE

## 2023-01-24 PROCEDURE — G8417 CALC BMI ABV UP PARAM F/U: HCPCS | Performed by: FAMILY MEDICINE

## 2023-01-24 PROCEDURE — 3078F DIAST BP <80 MM HG: CPT | Performed by: FAMILY MEDICINE

## 2023-01-24 PROCEDURE — G8399 PT W/DXA RESULTS DOCUMENT: HCPCS | Performed by: FAMILY MEDICINE

## 2023-01-24 PROCEDURE — 3017F COLORECTAL CA SCREEN DOC REV: CPT | Performed by: FAMILY MEDICINE

## 2023-01-24 PROCEDURE — 1101F PT FALLS ASSESS-DOCD LE1/YR: CPT | Performed by: FAMILY MEDICINE

## 2023-01-24 PROCEDURE — G8510 SCR DEP NEG, NO PLAN REQD: HCPCS | Performed by: FAMILY MEDICINE

## 2023-01-24 NOTE — PROGRESS NOTES
Chief Complaint   Patient presents with    Diabetes    Labs     Fasting today     1. Have you been to the ER, urgent care clinic since your last visit? Hospitalized since your last visit? No    2. Have you seen or consulted any other health care providers outside of the [address] [place] [address] since your last visit? Include any pap smears: Psyc. Dr Kriss Brown  1/24/2023  Provider:   [place]:  Diabetes Report Card   1) Have you seen the eye doctor in past year?yes    2) How would you  rate your Diabetic Diet? Pepsi daily   3) How well do you take care of your feet? Self care   4) Do you keep your Primary Care Follow Up Appts? yes    5) Do you know your A1C goal?no    6) Do you take your medications daily? yes    7) Do you check your blood sugars? yes    8) Have you gained weight?no       9) Do you follow an exercise program?no    10) Can you do better?yes      Lab Results   Component Value Date/Time    Cholesterol, total 257 (H) 02/15/2022 02:13 PM    HDL Cholesterol 45 02/15/2022 02:13 PM    LDL, calculated 162.6 (H) 02/15/2022 02:13 PM    Triglyceride 247 (H) 02/15/2022 02:13 PM    CHOL/HDL Ratio 5.7 (H) 02/15/2022 02:13 PM     Lab Results   Component Value Date/Time    Hemoglobin A1c 12.6 (H) 02/15/2022 02:13 PM    Hemoglobin A1c 11.1 (H) 01/19/2021 11:28 AM    Hemoglobin A1c 10.1 (H) 08/13/2020 04:30 PM    Glucose 126 (H) 02/15/2022 02:13 PM    Glucose (POC) 282 (H) 01/30/2019 02:57 PM    Microalbumin/Creat ratio (mg/g creat) 17 02/15/2022 02:13 PM    Microalbumin,urine random 5.09 02/15/2022 02:13 PM    LDL, calculated 162.6 (H) 02/15/2022 02:13 PM    Creatinine 0.52 (L) 02/15/2022 02:13 PM

## 2023-01-24 NOTE — PROGRESS NOTES
Chief Complaint   Patient presents with    Diabetes    Labs     Fasting today     1. Have you been to the ER, urgent care clinic since your last visit? Hospitalized since your last visit? No    2. Have you seen or consulted any other health care providers outside of the [address] [place] [address] since your last visit? Include any pap smears: Psyc. Dr Александр Vences  1/24/2023  Provider:   [place]:  Diabetes Report Card   1) Have you seen the eye doctor in past year?yes    2) How would you  rate your Diabetic Diet? Pepsi daily   3) How well do you take care of your feet? Self care   4) Do you keep your Primary Care Follow Up Appts? yes    5) Do you know your A1C goal?no    6) Do you take your medications daily? yes    7) Do you check your blood sugars? yes    8) Have you gained weight?no       9) Do you follow an exercise program?no    10) Can you do better?yes      Lab Results   Component Value Date/Time    Cholesterol, total 257 (H) 02/15/2022 02:13 PM    HDL Cholesterol 45 02/15/2022 02:13 PM    LDL, calculated 162.6 (H) 02/15/2022 02:13 PM    Triglyceride 247 (H) 02/15/2022 02:13 PM    CHOL/HDL Ratio 5.7 (H) 02/15/2022 02:13 PM     Lab Results   Component Value Date/Time    Hemoglobin A1c 12.6 (H) 02/15/2022 02:13 PM    Hemoglobin A1c 11.1 (H) 01/19/2021 11:28 AM    Hemoglobin A1c 10.1 (H) 08/13/2020 04:30 PM    Glucose 126 (H) 02/15/2022 02:13 PM    Glucose (POC) 282 (H) 01/30/2019 02:57 PM    Microalbumin/Creat ratio (mg/g creat) 17 02/15/2022 02:13 PM    Microalbumin,urine random 5.09 02/15/2022 02:13 PM    LDL, calculated 162.6 (H) 02/15/2022 02:13 PM    Creatinine 0.52 (L) 02/15/2022 02:13 PM      Lab Results   Component Value Date/Time    Microalbumin/Creat ratio (mg/g creat) 17 02/15/2022 02:13 PM    Microalbumin,urine random 5.09 02/15/2022 02:13 PM      Chief Complaint   Patient presents with    Diabetes    Labs     Fasting today     she is a 70y.o. year old female who presents for evaluation. See Diabetic Report Card listed above. Patient Active Problem List    Diagnosis    Essential hypertension    Uncontrolled type 2 diabetes mellitus with hyperglycemia ([address]    Legal blindness, as defined in [place] [facility]    Severe obesity (BMI 35.0-39. 9)    Type II or unspecified type diabetes mellitus without mention of complication, uncontrolled    Hyperlipidemia    Diabetic peripheral neuropathy associated with type 2 diabetes mellitus (HCC)    Post concussive syndrome    Partial epilepsy with impairment of consciousness, intractable ([address]    Intractable migraine without aura    Headache(784.0)       Reviewed PmHx, RxHx, FmHx, SocHx, AllgHx--dated and updated in the chart. Review of Systems - negative except as listed above in the HPI    Objective:     Vitals:    01/24/23 0857   BP: 117/75   Pulse: 85   Resp: 14   Temp: 97.7 °F (36.5 °C)   TempSrc: Oral   SpO2: 98%   Weight: 149 lb (67.6 kg)   Height: 5' (1.524 m)         Assessment/ Plan:   Diagnoses and all orders for this visit:    1. Uncontrolled type 2 diabetes mellitus with hyperglycemia (HCC)  -     LIPID PANEL; Future  -     HEMOGLOBIN A1C WITH EAG; Future  -     METABOLIC PANEL, COMPREHENSIVE; Future  Lab Results   Component Value Date/Time    Hemoglobin A1c 12.6 (H) 02/15/2022 02:13 PM    Hemoglobin A1c (POC) 6.5 05/28/2014 02:09 PM     Poor control  Poor compliance  Poor diet  Discussed patient importance of compliance with her insulin as well as checking her sugars. Patient is moving to [place] and plans to establish with endocrine doctor down there. 2. Seizure disorder (HCC)  Stable  3. Essential hypertension  -     LIPID PANEL; Future  -     METABOLIC PANEL, COMPREHENSIVE; Future  At goal  4. Pure hypercholesterolemia  -     LIPID PANEL; Future  -     METABOLIC PANEL, COMPREHENSIVE; Future    5. Diabetic peripheral neuropathy associated with type 2 diabetes mellitus (HCC)  -     HEMOGLOBIN A1C WITH EAG;  Future Follow-up and Dispositions    Return in about 3 months (around 4/24/2023). Lab Results   Component Value Date/Time    Cholesterol, total 257 (H) 02/15/2022 02:13 PM    HDL Cholesterol 45 02/15/2022 02:13 PM    LDL, calculated 162.6 (H) 02/15/2022 02:13 PM    Triglyceride 247 (H) 02/15/2022 02:13 PM    CHOL/HDL Ratio 5.7 (H) 02/15/2022 02:13 PM     Lab Results   Component Value Date/Time    Hemoglobin A1c 12.6 (H) 02/15/2022 02:13 PM    Hemoglobin A1c 11.1 (H) 01/19/2021 11:28 AM    Hemoglobin A1c 10.1 (H) 08/13/2020 04:30 PM    Microalbumin/Creat ratio (mg/g creat) 17 02/15/2022 02:13 PM    Microalbumin,urine random 5.09 02/15/2022 02:13 PM    LDL, calculated 162.6 (H) 02/15/2022 02:13 PM    Creatinine 0.52 (L) 02/15/2022 02:13 PM          Discussed with patient goal of Diabetes to include:  HgA1C <7, LDL cholesterol <100, Blood pressure <140/80. Discussed with patient diet and weight management and to get regular exercise. Recommend yearly eye exams and daily foot care. The patient understands and agrees with the plan. I have discussed the diagnosis with the patient and the intended plan as seen in the above orders. The patient has received an after-visit summary and questions were answered concerning future plans. Medication Side Effects and Warnings were discussed with patient  Patient Labs were reviewed and or requested  Patient Past Records were reviewed and or requested    Yana Navas M.D. [address] [facility]    There are no Patient Instructions on file for this visit.

## 2023-01-25 NOTE — PROGRESS NOTES
Your A1c is in very poor control as well as your cholesterol. Please make the changes we discussed.   Dr. Giovanny Barlow

## 2023-01-26 ENCOUNTER — OFFICE VISIT (OUTPATIENT)
Dept: NEUROLOGY | Age: 68
End: 2023-01-26
Payer: MEDICARE

## 2023-01-26 DIAGNOSIS — F41.9 SEVERE ANXIETY: ICD-10-CM

## 2023-01-26 DIAGNOSIS — F32.2 SEVERE MAJOR DEPRESSION (HCC): ICD-10-CM

## 2023-01-26 DIAGNOSIS — G31.84 MILD COGNITIVE IMPAIRMENT: Primary | ICD-10-CM

## 2023-01-26 DIAGNOSIS — F45.0 SOMATIZATION DISORDER: ICD-10-CM

## 2023-01-26 NOTE — LETTER
1/27/2023    Patient: Araseli Damon   YOB: 1955   Date of Visit: 1/26/2023     Rashmi Lopez, [address] [address]  [address] [facility] [address]    Dear Rashmi Lopez MD,      Thank you for referring Ms. Riley De Luna to [facility] for evaluation. My notes for this consultation are attached. If you have questions, please do not hesitate to call me. I look forward to following your patient along with you.       Sincerely,    Nilson London PsyD

## 2023-01-27 ENCOUNTER — PATIENT OUTREACH (OUTPATIENT)
Dept: CASE MANAGEMENT | Age: 68
End: 2023-01-27

## 2023-01-27 NOTE — PROGRESS NOTES
Ambulatory Care Management Note    Date/Time:  1/27/2023 10:52 AM    This Ambulatory Care Manager (ACM) reviewed and updated the following screenings during this call; general assessment, self management assessment, and SDOH assessments    Patient's challenges to self-management identified:   lack of knowledge about disease, level of motivation, transportation, and utilization of services      Medication Management:  poor adherence        [employer], Referrals, and Durable Medical Equipment: discussed diabetes education classes through [employer] and even virtual. Patient declined stating too much on her plate at this time. Health Maintenance Due   Topic Date Due    Shingles Vaccine (1 of 2) Never done    COVID-19 Vaccine (3 - Booster for Pfizer series) 07/19/2021    Foot Exam Q1  01/19/2022    Colorectal Cancer Screening Combo  01/30/2022    Flu Vaccine (1) 08/01/2022    DTaP/Tdap/Td series (2 - Td or Tdap) 08/02/2022    Eye Exam Retinal or Dilated  12/22/2022    Diabetic Alb to Cr ratio (uACR) test  02/15/2023    Medicare Yearly Exam  02/16/2023       PCP/Specialist follow up:   Future Appointments   Date Time Provider Zora Patel   2/21/2023 10:00 AM Jhon Viveros MD [facility] AMB   3/14/2023 11:40 AM Benigno Velarde PsyD [address] [facility] AMB      Ambulatory Care Management Note      Date/Time:  1/27/2023 10:54 AM    This patient was received as a referral from [facility] [employer]. Top Challenges reviewed with the provider   [facility]             Ambulatory  contacted patient for discussion and case management of diabetes management, community resources. Summary of patients top problems:   Patient with diabetes managed by PCP. Patient last A1C was 12 this month. Patient is non compliant with meds, as well as non compliant with diabetic diet. Patient currently takes: Trulicity once a week and Lantus 2 x day.   Patient declined metformin due to side effects and discussed with PCP at appt this month. Patient next appt with pcp is 2/21/23. Patient with no transportation. Relies on family or friends. Patient's challenges to self management identified:   lack of knowledge about disease, level of motivation, transportation, and utilization of services      Medication Management:  poor adherence     Goals Addressed                      This Visit's Progress      Diabetes Management         01/27/23  A1C this month was 12. Patient managed by PCP. Patient forgets to take her trulicity - we discussed setting a calendar event for each week on her phone to remind her. She will set this up. Patient is compliant with Lantus. Declined to take Metformin due to side effects which she discuss with provider she states. Patient is non compliant with diabetic diet. I will send information in mail to her to review. Patient was education on compliance necessity to manage her diabetes. We discussed how diabetes can affect your whole body and negative consequences of poor management. I will follow up in 2 weeks.          Transportation Resources (pt-stated)         01/27/23  Patient with no transportation. Rely on family and friends when available. I educated patient to call insurance to see if any transportation benefits. She will do this week. I will send transportation resources tip sheet in mail. We discussed calling them ASAP as it takes a few days prior to schedule ride. She will follow up when gets in mail. I will call in 2 weeks to follow up.                 PCP/Specialist follow up:   Future Appointments   Date Time Provider Zora Patel   2/21/2023 10:00 AM Sharon Cortez MD [facility] AM[facility]   3/14/2023 11:40 AM Aniceto Escobedo PsyD [facility] AMB        Patient verbalized understanding of all information discussed. Patient has this Ambulatory Care Manager's contact information for any further questions, concerns, or needs.
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
## 2023-01-27 NOTE — LETTER
1/27/2023 11:02 AM    Ms. Rangel Sample  [address] [address]      As promised here are the transportation resources we discussed. Also, please reach out to your insurance on the # on the back of your insurance card to see what benefits are available to you! I am also sending the diabetic diet information I promised for your review. As a reminder - setting alarms in your phone to remind you of weekly or daily medications due can help you remember to take them.         Sincerely,      Cheikh Mcknight RN

## 2023-02-01 ENCOUNTER — PATIENT OUTREACH (OUTPATIENT)
Dept: CASE MANAGEMENT | Age: 68
End: 2023-02-01

## 2023-02-07 DIAGNOSIS — G40.219 PARTIAL EPILEPSY WITH IMPAIRMENT OF CONSCIOUSNESS, INTRACTABLE (HCC): ICD-10-CM

## 2023-02-07 DIAGNOSIS — E11.65 UNCONTROLLED TYPE 2 DIABETES MELLITUS WITH HYPERGLYCEMIA (HCC): ICD-10-CM

## 2023-02-07 RX ORDER — GABAPENTIN 300 MG/1
CAPSULE ORAL
Qty: 30 CAPSULE | Refills: 5 | Status: SHIPPED | OUTPATIENT
Start: 2023-02-07

## 2023-02-14 ENCOUNTER — PATIENT OUTREACH (OUTPATIENT)
Dept: CASE MANAGEMENT | Age: 68
End: 2023-02-14

## 2023-02-14 DIAGNOSIS — E11.65 UNCONTROLLED TYPE 2 DIABETES MELLITUS WITH HYPERGLYCEMIA (HCC): Primary | ICD-10-CM

## 2023-02-21 ENCOUNTER — PATIENT OUTREACH (OUTPATIENT)
Dept: CASE MANAGEMENT | Age: 68
End: 2023-02-21

## 2023-02-21 ENCOUNTER — OFFICE VISIT (OUTPATIENT)
Dept: FAMILY MEDICINE CLINIC | Age: 68
End: 2023-02-21
Payer: MEDICARE

## 2023-02-21 VITALS
HEART RATE: 89 BPM | OXYGEN SATURATION: 98 % | HEIGHT: 60 IN | WEIGHT: 146 LBS | DIASTOLIC BLOOD PRESSURE: 75 MMHG | TEMPERATURE: 98.5 F | BODY MASS INDEX: 28.66 KG/M2 | RESPIRATION RATE: 14 BRPM | SYSTOLIC BLOOD PRESSURE: 120 MMHG

## 2023-02-21 DIAGNOSIS — H92.03 OTALGIA OF BOTH EARS: ICD-10-CM

## 2023-02-21 DIAGNOSIS — E11.65 UNCONTROLLED TYPE 2 DIABETES MELLITUS WITH HYPERGLYCEMIA (HCC): Primary | ICD-10-CM

## 2023-02-21 DIAGNOSIS — E78.00 PURE HYPERCHOLESTEROLEMIA: ICD-10-CM

## 2023-02-21 DIAGNOSIS — Z91.199 NONCOMPLIANCE: ICD-10-CM

## 2023-02-21 DIAGNOSIS — I10 ESSENTIAL HYPERTENSION: ICD-10-CM

## 2023-02-21 PROCEDURE — 99214 OFFICE O/P EST MOD 30 MIN: CPT | Performed by: FAMILY MEDICINE

## 2023-02-21 PROCEDURE — G9899 SCRN MAM PERF RSLTS DOC: HCPCS | Performed by: FAMILY MEDICINE

## 2023-02-21 PROCEDURE — G8427 DOCREV CUR MEDS BY ELIG CLIN: HCPCS | Performed by: FAMILY MEDICINE

## 2023-02-21 PROCEDURE — 3074F SYST BP LT 130 MM HG: CPT | Performed by: FAMILY MEDICINE

## 2023-02-21 PROCEDURE — 3017F COLORECTAL CA SCREEN DOC REV: CPT | Performed by: FAMILY MEDICINE

## 2023-02-21 PROCEDURE — 3078F DIAST BP <80 MM HG: CPT | Performed by: FAMILY MEDICINE

## 2023-02-21 PROCEDURE — G0463 HOSPITAL OUTPT CLINIC VISIT: HCPCS | Performed by: FAMILY MEDICINE

## 2023-02-21 PROCEDURE — 1123F ACP DISCUSS/DSCN MKR DOCD: CPT | Performed by: FAMILY MEDICINE

## 2023-02-21 PROCEDURE — 1101F PT FALLS ASSESS-DOCD LE1/YR: CPT | Performed by: FAMILY MEDICINE

## 2023-02-21 PROCEDURE — G8536 NO DOC ELDER MAL SCRN: HCPCS | Performed by: FAMILY MEDICINE

## 2023-02-21 PROCEDURE — G8417 CALC BMI ABV UP PARAM F/U: HCPCS | Performed by: FAMILY MEDICINE

## 2023-02-21 PROCEDURE — 3046F HEMOGLOBIN A1C LEVEL >9.0%: CPT | Performed by: FAMILY MEDICINE

## 2023-02-21 PROCEDURE — G8399 PT W/DXA RESULTS DOCUMENT: HCPCS | Performed by: FAMILY MEDICINE

## 2023-02-21 PROCEDURE — G8510 SCR DEP NEG, NO PLAN REQD: HCPCS | Performed by: FAMILY MEDICINE

## 2023-02-21 PROCEDURE — 1090F PRES/ABSN URINE INCON ASSESS: CPT | Performed by: FAMILY MEDICINE

## 2023-02-21 PROCEDURE — 2022F DILAT RTA XM EVC RTNOPTHY: CPT | Performed by: FAMILY MEDICINE

## 2023-02-21 RX ORDER — CETIRIZINE HYDROCHLORIDE 10 MG/1
10 TABLET ORAL DAILY
Qty: 30 TABLET | Refills: 5 | Status: SHIPPED | OUTPATIENT
Start: 2023-02-21

## 2023-02-21 NOTE — PROGRESS NOTES
Chief Complaint   Patient presents with    Diabetes    Labs     Fasting today    Ear Pain     SDounds like Ocean in her ears. 1. Have you been to the ER, urgent care clinic since your last visit? Hospitalized since your last visit? No    2. Have you seen or consulted any other health care providers outside of the [address] [place] [address] since your last visit? Include any pap smears or colon screening. Estefania Jamison  2/21/2023  Provider:   Irish:  Diabetes Report Card   1) Have you seen the eye doctor in past year?yes    2) How would you  rate your Diabetic Diet? Fair   3) How well do you take care of your feet? Self care   4) Do you keep your Primary Care Follow Up Appts? yes    5) Do you know your A1C goal?no    6) Do you take your medications daily? yes    7) Do you check your blood sugars? yes    8) Have you gained weight?no       9) Do you follow an exercise program?no    10) Can you do better?no      Lab Results   Component Value Date/Time    Cholesterol, total 270 (H) 01/24/2023 01:58 PM    HDL Cholesterol 52 01/24/2023 01:58 PM    LDL, calculated 179.6 (H) 01/24/2023 01:58 PM    Triglyceride 192 (H) 01/24/2023 01:58 PM    CHOL/HDL Ratio 5.2 (H) 01/24/2023 01:58 PM     Lab Results   Component Value Date/Time    Hemoglobin A1c 12.0 (H) 01/24/2023 01:58 PM    Hemoglobin A1c 12.6 (H) 02/15/2022 02:13 PM    Hemoglobin A1c 11.1 (H) 01/19/2021 11:28 AM    Glucose 84 01/24/2023 01:58 PM    Glucose (POC) 282 (H) 01/30/2019 02:57 PM    Microalbumin/Creat ratio (mg/g creat) 17 02/15/2022 02:13 PM    Microalbumin,urine random 5.09 02/15/2022 02:13 PM    LDL, calculated 179.6 (H) 01/24/2023 01:58 PM    Creatinine 0.60 01/24/2023 01:58 PM        Lab Results   Component Value Date/Time    Microalbumin/Creat ratio (mg/g creat) 17 02/15/2022 02:13 PM    Microalbumin,urine random 5.09 02/15/2022 02:13 PM      Chief Complaint   Patient presents with    Diabetes    Labs     Fasting today    Ear Pain     SDounds like [place] in her ears. she is a 70y.o. year old female who presents for evaluation. See Diabetic Report Card listed above. Patient Active Problem List    Diagnosis    Essential hypertension    Uncontrolled type 2 diabetes mellitus with hyperglycemia ([address]    Legal blindness, as defined in [place]    Severe obesity (BMI 35.0-39. 9)    Type II or unspecified type diabetes mellitus without mention of complication, uncontrolled    Hyperlipidemia    Diabetic peripheral neuropathy associated with type 2 diabetes mellitus (HCC)    Post concussive syndrome    Partial epilepsy with impairment of consciousness, intractable ([address]    Intractable migraine without aura    Headache(784.0)       Reviewed PmHx, RxHx, FmHx, SocHx, AllgHx--dated and updated in the chart. Review of Systems - negative except as listed above in the HPI    Objective:     Vitals:    02/21/23 0941   BP: 120/75   Pulse: 89   Resp: 14   Temp: 98.5 °F (36.9 °C)   TempSrc: Oral   SpO2: 98%   Weight: 146 lb (66.2 kg)   Height: 5' (1.524 m)         Assessment/ Plan:   Diagnoses and all orders for this visit:    1. Uncontrolled type 2 diabetes mellitus with hyperglycemia ([address]  Discussed patient poor labs. Patient uninterested in making any changes. 2. Essential hypertension  At goal  3. Pure hypercholesterolemia  See labs  4. Otalgia of both ears  Eustachian tube dysfunction of both ears. Add Zyrtec and refer to ENT if not better.        Lab Results   Component Value Date/Time    Cholesterol, total 270 (H) 01/24/2023 01:58 PM    HDL Cholesterol 52 01/24/2023 01:58 PM    LDL, calculated 179.6 (H) 01/24/2023 01:58 PM    Triglyceride 192 (H) 01/24/2023 01:58 PM    CHOL/HDL Ratio 5.2 (H) 01/24/2023 01:58 PM     Lab Results   Component Value Date/Time    Hemoglobin A1c 12.0 (H) 01/24/2023 01:58 PM    Hemoglobin A1c 12.6 (H) 02/15/2022 02:13 PM    Hemoglobin A1c 11.1 (H) 01/19/2021 11:28 AM    Microalbumin/Creat ratio (mg/g creat) 17 02/15/2022 02:13 PM    Microalbumin,urine random 5.09 02/15/2022 02:13 PM    LDL, calculated 179.6 (H) 01/24/2023 01:58 PM    Creatinine 0.60 01/24/2023 01:58 PM          Discussed with patient goal of Diabetes to include:  HgA1C <7, LDL cholesterol <100, Blood pressure <140/80. Discussed with patient diet and weight management and to get regular exercise. Recommend yearly eye exams and daily foot care. The patient understands and agrees with the plan. I have discussed the diagnosis with the patient and the intended plan as seen in the above orders. The patient has received an after-visit summary and questions were answered concerning future plans. Medication Side Effects and Warnings were discussed with patient  Patient Labs were reviewed and or requested  Patient Past Records were reviewed and or requested    Nidia Mcelroy M.D. [address]    There are no Patient Instructions on file for this visit.

## 2023-02-21 NOTE — PROGRESS NOTES
Chief Complaint   Patient presents with    Diabetes    Labs     Fasting today    Ear Pain     SDounds like [place] in her ears. 1. Have you been to the ER, urgent care clinic since your last visit? Hospitalized since your last visit? No    2. Have you seen or consulted any other health care providers outside of the [address] [place] [address] since your last visit? Include any pap smears or colon screening. No      Costella Grade  2/21/2023  Provider:   Irish:  Diabetes Report Card   1) Have you seen the eye doctor in past year?yes    2) How would you  rate your Diabetic Diet? Fair   3) How well do you take care of your feet? Self care   4) Do you keep your Primary Care Follow Up Appts? yes    5) Do you know your A1C goal?no    6) Do you take your medications daily? yes    7) Do you check your blood sugars? yes    8) Have you gained weight?no       9) Do you follow an exercise program?no    10) Can you do better?no      Lab Results   Component Value Date/Time    Cholesterol, total 270 (H) 01/24/2023 01:58 PM    HDL Cholesterol 52 01/24/2023 01:58 PM    LDL, calculated 179.6 (H) 01/24/2023 01:58 PM    Triglyceride 192 (H) 01/24/2023 01:58 PM    CHOL/HDL Ratio 5.2 (H) 01/24/2023 01:58 PM     Lab Results   Component Value Date/Time    Hemoglobin A1c 12.0 (H) 01/24/2023 01:58 PM    Hemoglobin A1c 12.6 (H) 02/15/2022 02:13 PM    Hemoglobin A1c 11.1 (H) 01/19/2021 11:28 AM    Glucose 84 01/24/2023 01:58 PM    Glucose (POC) 282 (H) 01/30/2019 02:57 PM    Microalbumin/Creat ratio (mg/g creat) 17 02/15/2022 02:13 PM    Microalbumin,urine random 5.09 02/15/2022 02:13 PM    LDL, calculated 179.6 (H) 01/24/2023 01:58 PM    Creatinine 0.60 01/24/2023 01:58 PM

## 2023-02-21 NOTE — PROGRESS NOTES
I.  Patient in [place] requesting Complex CM Enrollment Report/fu. Patient already opened for CCM on 1/13/2023. Need clarification on CCM continuation or new case prior to patient contact/fu. II. Consult done. Referral was initiated by PCP. CCM will be continued as episode opened by initial ACM. No further follow-up by this ACM will be done.

## 2023-02-22 ENCOUNTER — APPOINTMENT (OUTPATIENT)
Dept: CT IMAGING | Age: 68
End: 2023-02-22
Attending: EMERGENCY MEDICINE
Payer: MEDICARE

## 2023-02-22 ENCOUNTER — HOSPITAL ENCOUNTER (EMERGENCY)
Age: 68
Discharge: HOME | End: 2023-02-23
Attending: EMERGENCY MEDICINE
Payer: MEDICARE

## 2023-02-22 ENCOUNTER — APPOINTMENT (OUTPATIENT)
Dept: GENERAL RADIOLOGY | Age: 68
End: 2023-02-22
Attending: EMERGENCY MEDICINE
Payer: MEDICARE
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
## 2023-02-22 DIAGNOSIS — S09.90XA TRAUMATIC INJURY OF HEAD, INITIAL ENCOUNTER: ICD-10-CM

## 2023-02-22 DIAGNOSIS — E11.65 TYPE 2 DIABETES MELLITUS WITH HYPERGLYCEMIA, WITHOUT LONG-TERM CURRENT USE OF INSULIN (HCC): ICD-10-CM

## 2023-02-22 DIAGNOSIS — S20.212A CONTUSION OF LEFT CHEST WALL, INITIAL ENCOUNTER: ICD-10-CM

## 2023-02-22 DIAGNOSIS — M25.512 PAIN IN JOINT OF LEFT SHOULDER: ICD-10-CM

## 2023-02-22 DIAGNOSIS — M25.559 HIP PAIN: ICD-10-CM

## 2023-02-22 DIAGNOSIS — M54.2 NECK PAIN: ICD-10-CM

## 2023-02-22 PROCEDURE — 73030 X-RAY EXAM OF SHOULDER: CPT

## 2023-02-22 PROCEDURE — 72125 CT NECK SPINE W/O DYE: CPT

## 2023-02-22 PROCEDURE — 99284 EMERGENCY DEPT VISIT MOD MDM: CPT

## 2023-02-22 PROCEDURE — 71250 CT THORAX DX C-: CPT

## 2023-02-22 PROCEDURE — 70450 CT HEAD/BRAIN W/O DYE: CPT

## 2023-02-22 RX ORDER — ACETAMINOPHEN 500 MG
1000 TABLET ORAL
Status: COMPLETED | OUTPATIENT
Start: 2023-02-22 | End: 2023-02-23

## 2023-02-23 ENCOUNTER — PATIENT OUTREACH (OUTPATIENT)
Dept: CASE MANAGEMENT | Age: 68
End: 2023-02-23

## 2023-02-23 VITALS
WEIGHT: 147.71 LBS | OXYGEN SATURATION: 96 % | BODY MASS INDEX: 29 KG/M2 | HEART RATE: 90 BPM | RESPIRATION RATE: 18 BRPM | HEIGHT: 60 IN | TEMPERATURE: 98.2 F | DIASTOLIC BLOOD PRESSURE: 80 MMHG | SYSTOLIC BLOOD PRESSURE: 129 MMHG

## 2023-02-23 LAB
ANION GAP SERPL CALC-SCNC: 10 MMOL/L (ref 5–15)
BASOPHILS # BLD: 0.1 K/UL (ref 0–1)
BASOPHILS NFR BLD: 1 % (ref 0–1)
BUN SERPL-MCNC: 11 MG/DL (ref 8–23)
BUN/CREAT SERPL: 20 (ref 12–20)
CALCIUM SERPL-MCNC: 9.1 MG/DL (ref 8.8–10.2)
CHLORIDE SERPL-SCNC: 96 MMOL/L (ref 98–107)
CO2 SERPL-SCNC: 30 MMOL/L (ref 22–29)
CREAT SERPL-MCNC: 0.56 MG/DL (ref 0.5–0.9)
DIFFERENTIAL METHOD BLD: ABNORMAL
EOSINOPHIL # BLD: 0.3 K/UL (ref 0–0.4)
EOSINOPHIL NFR BLD: 3 %
ERYTHROCYTE [DISTWIDTH] IN BLOOD BY AUTOMATED COUNT: 11.9 % (ref 11.5–14.5)
GLUCOSE SERPL-MCNC: 371 MG/DL (ref 65–100)
HCT VFR BLD AUTO: 39.2 % (ref 35–47)
HGB BLD-MCNC: 13.8 G/DL (ref 11.5–16)
IMM GRANULOCYTES # BLD AUTO: 0 K/UL (ref 0–0.04)
IMM GRANULOCYTES NFR BLD AUTO: 0 % (ref 0–0.5)
LYMPHOCYTES # BLD: 3.5 K/UL (ref 0.8–3.5)
LYMPHOCYTES NFR BLD: 40 % (ref 12–49)
MCH RBC QN AUTO: 31.2 PG (ref 26–34)
MCHC RBC AUTO-ENTMCNC: 35.2 G/DL (ref 30–36.5)
MCV RBC AUTO: 88.5 FL (ref 80–99)
MONOCYTES # BLD: 0.6 K/UL (ref 0–1)
MONOCYTES NFR BLD: 7 % (ref 5–13)
NEUTS SEG # BLD: 4.3 K/UL (ref 1.8–8)
NEUTS SEG NFR BLD: 49 % (ref 32–75)
NRBC # BLD: 0 K/UL (ref 0–0.01)
NRBC BLD-RTO: 0 PER 100 WBC
PLATELET # BLD AUTO: 196 K/UL (ref 150–400)
PMV BLD AUTO: 10.1 FL (ref 8.9–12.9)
POTASSIUM SERPL-SCNC: 3.7 MMOL/L (ref 3.5–5.1)
RBC # BLD AUTO: 4.43 M/UL (ref 3.8–5.2)
SODIUM SERPL-SCNC: 136 MMOL/L (ref 136–145)
WBC # BLD AUTO: 8.7 K/UL (ref 3.6–11)

## 2023-02-23 PROCEDURE — 85025 COMPLETE CBC W/AUTO DIFF WBC: CPT

## 2023-02-23 PROCEDURE — 74011250637 HC RX REV CODE- 250/637: Performed by: EMERGENCY MEDICINE

## 2023-02-23 PROCEDURE — 80048 BASIC METABOLIC PNL TOTAL CA: CPT

## 2023-02-23 PROCEDURE — 36415 COLL VENOUS BLD VENIPUNCTURE: CPT

## 2023-02-23 PROCEDURE — 74011250636 HC RX REV CODE- 250/636: Performed by: EMERGENCY MEDICINE

## 2023-02-23 RX ADMIN — SODIUM CHLORIDE 1000 ML: 9 INJECTION, SOLUTION INTRAVENOUS at 00:05

## 2023-02-23 RX ADMIN — ACETAMINOPHEN 1000 MG: 500 TABLET ORAL at 00:05

## 2023-02-23 NOTE — ED PROVIDER NOTES
PT has c/o HA w/ hematoma s/p trip and fall. She also has c/o L arm pain. She states she tripped over some bricks and landed on her arm and head. No LOC. A&O, VS WNL. Marcial Bradshaw 69-year-old female who had a mechanical trip and fall causing her hit left side of her head and landed on the left side of her body and hip. Patient tripped over a brick. No blood thinners. Patient is diabetic and reports that her blood sugars have been elevated for some time now and had recent visit with her PCP regarding this. Denies any fevers or chills no preceding symptoms of dizziness lightheadedness or diaphoresis. No loss conscious no blood thinners. No nausea vomiting blurred vision loss of vision. Patient having pain in the left side of her head neck shoulder and left chest and abdomen and left hip pain. No leg shortening. Neurovascular intact. Imaging ordered with no acute abnormalities. Radiologist called me due to CT chest abdomen pelvis not crossing over into PACS at this time and reports that there is no acute abnormalities. On labs patient has elevated glucose with no signs of DKA. Normal labs and electrolytes otherwise. I discussed symptomatic treatment for her symptoms and continued management of her blood sugars. No current facility-administered medications on file prior to encounter. Current Outpatient Medications on File Prior to Encounter:  cetirizine (ZYRTEC) 10 mg tablet, Take 1 Tablet by mouth daily. , Disp: 30 Tablet, Rfl: 5  glucose blood VI test strips strip, Testing BID, dx: E11.65, Disp: 200 Strip, Rfl: 11  gabapentin (NEURONTIN) 300 mg capsule, TAKE 1 CAPSULE BY MOUTH AT NIGHT, Disp: 30 Capsule, Rfl: 5  ALPRAZolam (Xanax) 1 mg tablet, Take as needed. Take  by mouth. Take as needed. , Disp: 30 Tablet, Rfl: 0  divalproex DR (DEPAKOTE) 250 mg tablet, Take 3 tablets by mouth in the AM and 3 tablets by mouth QHS, Disp: 180 Tablet, Rfl: 5  cyclobenzaprine (FLEXERIL) 10 mg tablet, Take 1 Tablet by mouth two (2) times daily as needed for Muscle Spasm(s). , Disp: 180 Tablet, Rfl: 1  Insulin Needles, Disposable, 31 gauge x 5/16\" ndle, Using with Lantus and Trulicity, 8 per week, Dx: E11.65, Disp: 100 Each, Rfl: 5  rosuvastatin (CRESTOR) 40 mg tablet, Take 1 Tablet by mouth nightly., Disp: 90 Tablet, Rfl: 1  dulaglutide (Trulicity) 1.5 AL/5.9 mL sub-q pen, 0.5 mL by SubCUTAneous route every seven (7) days. , Disp: 12 Each, Rfl: 1  insulin glargine (Lantus Solostar U-100 Insulin) 100 unit/mL (3 mL) inpn, 75 Units by SubCUTAneous route two (2) times a day., Disp: 45 Each, Rfl: 1  cetirizine (ZYRTEC) 10 mg tablet, Take 1 Tablet by mouth daily. , Disp: 30 Tablet, Rfl: 5  azithromycin (ZITHROMAX) 250 mg tablet, Take two tablets today then one tablet daily (Patient not taking: No sig reported), Disp: 6 Tablet, Rfl: 0  acetaminophen (TYLENOL) 500 mg tablet, Take 2 Tablets by mouth every six (6) hours as needed for Pain or Fever., Disp: 20 Tablet, Rfl: 0  metFORMIN ER (GLUCOPHAGE XR) 500 mg tablet, Titrate gradually up until taking 4 tablets daily, Disp: 360 Tablet, Rfl: 1  cholecalciferol (Vitamin D3) (1000 Units /25 mcg) tablet, Take 5 Tablets by mouth daily. , Disp: 90 Tablet, Rfl: 3  butalbital-acetaminophen-caffeine (FIORICET, ESGIC) -40 mg per tablet, Take 1-2 tablets by mouth every 8 hours PRN for headache., Disp: 40 Tablet, Rfl: 5  PARoxetine (PaxiL) 20 mg tablet, Take 1 Tab by mouth daily. (Patient taking differently: Take 30 mg by mouth daily.), Disp: 90 Tab, Rfl: 1  lancets misc, Testing BID, dx: E11.65, Disp: 1 Each, Rfl: 11  omeprazole (PRILOSEC) 20 mg capsule, Take 1 Cap by mouth daily.  (Patient not taking: No sig reported), Disp: 90 Cap, Rfl: 1  Blood-Glucose Meter (FREESTYLE LITE METER) monitoring kit, Check sugars bid., Disp: 1 Kit, Rfl: 0  Blood-Glucose Meter monitoring kit, Testing BID, dx: E11.65, Disp: 1 Kit, Rfl: 0  diphenhydrAMINE (BENADRYL) 25 mg tablet, Take 25 mg by mouth every six (6) hours as needed. , Disp: , Rfl:              Past Medical History:   Diagnosis Date    Diabetic peripheral neuropathy associated with type 2 diabetes mellitus (HCC)     Dyslipidemia     Hx of migraines     Hyperlipidemia     Type II or unspecified type diabetes mellitus without mention of complication, uncontrolled     Unspecified epilepsy without mention of intractable epilepsy     Vision decreased        Past Surgical History:   Procedure Laterality Date    COLONOSCOPY N/A 1/30/2019    COLONOSCOPY performed by Lucille Sandifer, MD at [facility] ENDOSCOPY    HX CATARACT REMOVAL Bilateral     HX COLONOSCOPY      HX ENDOSCOPY  04/2016    EGD, stretched her esopaghus, recurrent issue     HX GYN Bilateral     tubaligation    HX ORTHOPAEDIC      both knees, L shoulder     HX REFRACTIVE SURGERY  02/05/2012    right eye    HX RETINAL DETACHMENT REPAIR Bilateral          Family History:   Problem Relation Age of Onset    Cancer Mother     Cancer Other     Heart Disease Father        Social History     Socioeconomic History    Marital status:      Spouse name: Not on file    Number of children: Not on file    Years of education: Not on file    Highest education level: Not on file   Occupational History    Not on file   Tobacco Use    Smoking status: Never    Smokeless tobacco: Never   Substance and Sexual Activity    Alcohol use: No    Drug use: Not on file    Sexual activity: Never     Partners: Male   Other Topics Concern    Not on file   Social History Narrative    Not on file     Social Determinants of Health     Financial Resource Strain: Low Risk     Difficulty of Paying Living Expenses: Not very hard   Food Insecurity: No Food Insecurity    Worried About Running Out of Food in the Last Year: Never true    Ran Out of Food in the Last Year: Never true   Transportation Needs: Unmet Transportation Needs    Lack of Transportation (Medical): Yes    Lack of Transportation (Non-Medical): Yes   Physical Activity: Inactive    Days of Exercise per Week: 0 days    Minutes of Exercise per Session: 0 min   Stress: Stress Concern Present    Feeling of Stress : To some extent   Social Connections: Unknown    Frequency of Communication with Friends and Family: Patient refused    Frequency of Social Gatherings with Friends and Family: Patient refused    Attends Oriental orthodox Services: Patient refused    Active Member of Clubs or Organizations: Patient refused    Attends Club or Organization Meetings: Patient refused    Marital Status: Patient refused   Intimate Partner Violence: Unknown    Fear of Current or Ex-Partner: Patient refused    Emotionally Abused: Patient refused    Physically Abused: Patient refused    Sexually Abused: Patient refused   Housing Stability: Low Risk     Unable to Pay for Housing in the Last Year: No    Number of Places Lived in the Last Year: 1    Unstable Housing in the Last Year: No         ALLERGIES: Flexall [menthol-aloe vera extract] and Sudafed [pseudoephedrine hcl]    Review of Systems    Vitals:    02/22/23 2207 02/22/23 2220 02/22/23 2235   BP: (!) 160/65 (!) 148/65 113/71   Pulse: 90     Resp: 18     Temp: 98.2 °F (36.8 °C)     SpO2: 98% 95% 95%   Weight: 67 kg (147 lb 11.3 oz)     Height: 5' (1.524 m)              Physical Exam  Vitals and nursing note reviewed. Constitutional:       Appearance: She is well-developed. HENT:      Head: Normocephalic. Contusion present. No raccoon eyes, Lizarraga's sign, abrasion or laceration. Hair is normal.      Jaw: There is normal jaw occlusion. Eyes:      Conjunctiva/sclera: Conjunctivae normal.   Cardiovascular:      Rate and Rhythm: Normal rate and regular rhythm. Pulmonary:      Effort: Pulmonary effort is normal. No respiratory distress. Breath sounds: Normal breath sounds. Chest:      Chest wall: Tenderness present. Abdominal:      General: Bowel sounds are normal.      Palpations: Abdomen is soft. There is no mass. Tenderness: There is abdominal tenderness. There is no guarding or rebound. Hernia: No hernia is present. Musculoskeletal:         General: Normal range of motion. Left shoulder: Bony tenderness present. No swelling, deformity, effusion or laceration. Normal range of motion. Left upper arm: Normal.      Left elbow: Normal.      Left forearm: Normal.      Left wrist: Normal.      Cervical back: Normal range of motion and neck supple. Muscular tenderness present. No spinous process tenderness. Left hip: Tenderness present. No deformity, lacerations, bony tenderness or crepitus. Normal range of motion. Normal strength. Skin:     General: Skin is warm. Capillary Refill: Capillary refill takes less than 2 seconds. Findings: No abrasion, ecchymosis, erythema, laceration, petechiae or rash. Neurological:      Mental Status: She is alert and oriented to person, place, and time. Comments: No gross motor or sensory deficits        Medical Decision Making    53-year-old female who had a mechanical trip and fall causing her hit left side of her head and landed on the left side of her body and hip. Patient tripped over a brick. No blood thinners. Patient is diabetic and reports that her blood sugars have been elevated for some time now and had recent visit with her PCP regarding this. Denies any fevers or chills no preceding symptoms of dizziness lightheadedness or diaphoresis. No loss conscious no blood thinners. No nausea vomiting blurred vision loss of vision. Patient having pain in the left side of her head neck shoulder and left chest and abdomen and left hip pain. No leg shortening. Neurovascular intact. Imaging ordered with no acute abnormalities. Radiologist called me due to CT chest abdomen pelvis not crossing over into PACS at this time and reports that there is no acute abnormalities. On labs patient has elevated glucose with no signs of DKA. Normal labs and electrolytes otherwise.   I discussed symptomatic treatment for her symptoms and continued management of her blood sugars. Amount and/or Complexity of Data Reviewed  Labs: ordered. Decision-making details documented in ED Course. Radiology: ordered and independent interpretation performed. Decision-making details documented in ED Course. Risk  OTC drugs. Procedures               Recent Results (from the past 24 hour(s))   CBC WITH AUTOMATED DIFF    Collection Time: 02/23/23 12:04 AM   Result Value Ref Range    WBC 8.7 3.6 - 11.0 K/uL    RBC 4.43 3.80 - 5.20 M/uL    HGB 13.8 11.5 - 16.0 g/dL    HCT 39.2 35.0 - 47.0 %    MCV 88.5 80.0 - 99.0 FL    MCH 31.2 26.0 - 34.0 PG    MCHC 35.2 30.0 - 36.5 g/dL    RDW 11.9 11.5 - 14.5 %    PLATELET 309 612 - 358 K/uL    MPV 10.1 8.9 - 12.9 FL    NRBC 0.0 0  WBC    ABSOLUTE NRBC 0.00 0.00 - 0.01 K/uL    NEUTROPHILS 49 32 - 75 %    LYMPHOCYTES 40 12 - 49 %    MONOCYTES 7 5 - 13 %    EOSINOPHILS 3 (L) 7 %    BASOPHILS 1 0 - 1 %    IMMATURE GRANULOCYTES 0 0 - 0.5 %    ABS. NEUTROPHILS 4.3 1.8 - 8.0 K/UL    ABS. LYMPHOCYTES 3.5 0.8 - 3.5 K/UL    ABS. MONOCYTES 0.6 0.0 - 1.0 K/UL    ABS. EOSINOPHILS 0.3 0.0 - 0.4 K/UL    ABS. BASOPHILS 0.1 0 - 1 K/UL    ABS. IMM. GRANS. 0.0 0.00 - 0.04 K/UL    DF AUTOMATED     METABOLIC PANEL, BASIC    Collection Time: 02/23/23 12:04 AM   Result Value Ref Range    Sodium 136 136 - 145 mmol/L    Potassium 3.7 3.5 - 5.1 mmol/L    Chloride 96 (L) 98 - 107 mmol/L    CO2 30 (H) 22 - 29 mmol/L    Anion gap 10 5 - 15 mmol/L    Glucose 371 (H) 65 - 100 mg/dL    BUN 11 8 - 23 MG/DL    Creatinine 0.56 0.50 - 0.90 MG/DL    BUN/Creatinine ratio 20 12 - 20      eGFR >60 >60 ml/min/1.73m2    Calcium 9.1 8.8 - 10.2 MG/DL       XR SHOULDER LT AP/LAT MIN 2 V    Result Date: 2/22/2023  EXAM: XR SHOULDER LT AP/LAT MIN 2 V INDICATION: injury. COMPARISON: None. FINDINGS: Three views of the left shoulder demonstrate no definite fracture. Widening of the acromioclavicular joint.  Question history of prior distal clavicular resection. .     Widening of the [facility] [place] joint which may be related to prior surgery. Correlate with history. Otherwise no acute pathology. .    CT HEAD WO CONT    Result Date: 2/22/2023  EXAM: CT HEAD WO CONT INDICATION: head trauma COMPARISON: No comparisons. CONTRAST: None. TECHNIQUE: Unenhanced CT of the head was performed using 5 mm images. Brain and bone windows were generated. Coronal and sagittal reformats. CT dose reduction was achieved through use of a standardized protocol tailored for this examination and automatic exposure control for dose modulation. FINDINGS: The ventricles and sulci are normal in size, shape and configuration. There is no significant white matter disease. There is no intracranial hemorrhage, extra-axial collection, or mass effect. The basilar cisterns are open. No CT evidence of acute infarct. The bone windows demonstrate no abnormalities. The visualized portions of the paranasal sinuses and mastoid air cells are clear. No acute intracranial abnormality. CT SPINE CERV WO CONT    Result Date: 2/22/2023  EXAM:  CT CERVICAL SPINE WITHOUT CONTRAST INDICATION: neck pain. COMPARISON: 8/4/2014 CONTRAST:  None. TECHNIQUE: Multislice helical CT of the cervical spine was performed without intravenous contrast administration. Sagittal and coronal reformats were generated. CT dose reduction was achieved through use of a standardized protocol tailored for this examination and automatic exposure control for dose modulation. FINDINGS: The alignment is within normal limits. There is no fracture or compression deformity. The odontoid process is intact. The craniocervical junction is within normal limits. The incidentally imaged soft tissues are within normal limits. C2-C3: Facet hypertrophy. No canal stenosis or foraminal narrowing. Terrea Skates C3-C4: Facet hypertrophy. No canal stenosis or foraminal narrowing. Terrea Skates C4-C5: Posterior disc osteophyte complex and facet hypertrophy.  Minimal canal stenosis. No significant foraminal narrowing. Luz Isabel C5-C6: Posterior disc osteophyte complex with facet hypertrophy. No significant canal stenosis or foraminal narrowing. Luz Isabel C6-C7: Posterior disc osteophyte complex and facet hypertrophy. Minimal left foraminal narrowing. Mild canal stenosis. C7-T1: Posterior disc osteophyte complex and facet hypertrophy with minimal left foraminal narrowing. Mild canal stenosis. .     Degenerative changes in the cervical spine as described. Overall canal stenosis is mild.

## 2023-02-23 NOTE — LETTER
2/23/2023 11:44 AM    Ms. Jamari Briones  [address] [address]    Attached you will find the diabetes education information as well as transportation resources.   Thank you,          Sincerely,      Anila Salas RN

## 2023-02-23 NOTE — PROGRESS NOTES
Goals Addressed                      This Visit's Progress      Diabetes Management         02/23/23  Patient states she did review information on diabetic diet, would like more information on ideas she CAN eat - I will send in mail. Patient is interested in diabetes education classes, referral was placed by providers. Patient will obtain that information in mail and call to schedule classes. I will follow up in 2 weeks.     01/27/23  A1C this month was 12. Patient managed by PCP. Patient forgets to take her trulicity - we discussed setting a calendar event for each week on her phone to remind her. She will set this up. Patient is compliant with Lantus. Declined to take Metformin due to side effects which she discuss with provider she states. Patient is non compliant with diabetic diet. I will send information in mail to her to review. Patient was education on compliance necessity to manage her diabetes. We discussed how diabetes can affect your whole body and negative consequences of poor management. I will follow up in 2 weeks.          Transportation Resources (pt-stated)         02/23/23  Patient did not call insurance to see about transportation information, she will do this this week. I will resend transportation resource tip sheet as she says she has misplaced. Patient will await this information in mail. I will follow up in 2 weeks.     01/27/23  Patient with no transportation. Rely on family and friends when available. I educated patient to call insurance to see if any transportation benefits. She will do this week. I will send transportation resources tip sheet in mail. We discussed calling them ASAP as it takes a few days prior to schedule ride. She will follow up when gets in mail. I will call in 2 weeks to follow up.

## 2023-02-23 NOTE — ED TRIAGE NOTES
PT has c/o HA w/ hematoma s/p trip and fall. She also has c/o L arm pain. She states she tripped over some bricks and landed on her arm and head. No LOC. A&O, VS WNL. Elva Urias

## 2023-03-14 ENCOUNTER — PATIENT OUTREACH (OUTPATIENT)
Dept: CASE MANAGEMENT | Age: 68
End: 2023-03-14

## 2023-03-14 ENCOUNTER — OFFICE VISIT (OUTPATIENT)
Dept: NEUROLOGY | Age: 68
End: 2023-03-14
Payer: MEDICARE

## 2023-03-14 DIAGNOSIS — F32.2 SEVERE MAJOR DEPRESSION (HCC): ICD-10-CM

## 2023-03-14 DIAGNOSIS — F45.0 SOMATIZATION DISORDER: ICD-10-CM

## 2023-03-14 DIAGNOSIS — G31.84 MILD COGNITIVE IMPAIRMENT: Primary | ICD-10-CM

## 2023-03-14 DIAGNOSIS — F41.9 SEVERE ANXIETY: ICD-10-CM

## 2023-03-14 PROCEDURE — 1123F ACP DISCUSS/DSCN MKR DOCD: CPT | Performed by: CLINICAL NEUROPSYCHOLOGIST

## 2023-03-14 PROCEDURE — 90847 FAMILY PSYTX W/PT 50 MIN: CPT | Performed by: CLINICAL NEUROPSYCHOLOGIST

## 2023-03-14 NOTE — PROGRESS NOTES
Prior to seeing the patient I reviewed the records, including the previously completed report, the records in [place], and any updated visits from other providers since I saw the patient last.      Today, I engaged in a psychoeducational and supportive and cognitive/behavioral psychotherapy session with the patient and family. I provided psychotherapy in the form of psychoeducation and support with respect to the results of the recent Neuropsychological Evaluation, including discussing individual tests as well as patient's areas of neurocognitive strength versus weakness. We discussed, in detail, the following:      Results from serial neurocognitive testing show some minor fluctuations here and there but most certainly no indications suggesting a dementia type process. Instead, she has significantly worsened mood including somatization, anxiety, depression, and PTSD. Anxiety markedly impacts her ability to focus and to attend which, in term, impacts day-to-day functional memory. It is my opinion that the patient's reported day-to-day memory problems are secondary to emotional distress. In addition to continued medical care, my recommendations include a review of her psychiatric medication management for depression and anxiety. She should be participating in at least weekly psychotherapy. Consider also an appropriate medication for attention if this is not medically contraindicated. I hope that the patient is reassured that there is little evidence of an organic memory problem. Not concerned about capacity, driving, day-to-day supervision, etc.  Stay active mentally, physically,and socially. We now have extensive baseline and updated data on her. Follow up prn. Clinical correlation is, of course, indicated. I will discuss these findings with the patient when she follows up with me in the near future.   A follow up Neuropsychological Evaluation is indicated on a prn basis, especially if there are any cognitive and/or emotional changes. DIAGNOSES: MCI Without Memory Loss                          PTSD                          Somatization                          Severe Anxiety                          Severe Major Depression    Education was provided regarding my diagnostic impressions, and we discussed treatment plan/options. I also answered numerous questions related to the clinical findings, including discussing various methods to improve cognition and mood. Counseling provided regarding mood and cognition. CBT and supportive psychotherapy techniques were utilized. Supportive/Cognitive Behavioral/Solution Focused psychotherapy provided  Discussed rational versus irrational thinking patterns and their consequences. Discussed healthy/adaptive and unhealthy/maladaptive coping. The patient needs to for psychiatry and psychology. Consider TMS,  Consider EMDR. .  Was in the ED all night after three falls. Trauma is a major issue.         The patient had the following concerns which I deferred to their referring provider: meds for mood/cognition      Time spent today: 45

## 2023-03-14 NOTE — PROGRESS NOTES
Goals Addressed                      This Visit's Progress      Diabetes Management         03/14/23  Patient appreciated the additional info sent in mail. She was able to review, and does not have any questions. Patient was not able to make diabetes education class yet, but she did ask for # again and I gave that for her to call this week. She is interested in the virtual classes since transportation an issue. I will follow up in 2 weeks. VL    02/23/23  Patient states she did review information on diabetic diet, would like more information on ideas she CAN eat - I will send in mail. Patient is interested in diabetes education classes, referral was placed by providers. Patient will obtain that information in mail and call to schedule classes. I will follow up in 2 weeks. VL    01/27/23  A1C this month was 12. Patient managed by PCP. Patient forgets to take her trulicity - we discussed setting a calendar event for each week on her phone to remind her. She will set this up. Patient is compliant with Lantus. Declined to take Metformin due to side effects which she discuss with provider she states. Patient is non compliant with diabetic diet. I will send information in mail to her to review. Patient was education on compliance necessity to manage her diabetes. We discussed how diabetes can affect your whole body and negative consequences of poor management. I will follow up in 2 weeks. VL         COMPLETED: Transportation Resources (pt-stated)         03/14/23  Patient states she did receive the information in mail on transportation resources. Patient has not reached out to any as of yet, but is thankful to have the resources on hand. Is also aware she can call her insurance co for resources as well. VL    02/23/23  Patient did not call insurance to see about transportation information, she will do this this week. I will resend transportation resource tip sheet as she says she has misplaced.   Patient will await this information in mail. I will follow up in 2 weeks. VL    01/27/23  Patient with no transportation. Rely on family and friends when available. I educated patient to call insurance to see if any transportation benefits. She will do this week. I will send transportation resources tip sheet in mail. We discussed calling them ASAP as it takes a few days prior to schedule ride. She will follow up when gets in mail. I will call in 2 weeks to follow up.  VL

## 2023-03-28 ENCOUNTER — PATIENT OUTREACH (OUTPATIENT)
Dept: CASE MANAGEMENT | Age: 68
End: 2023-03-28

## 2023-03-28 NOTE — PROGRESS NOTES
Goals Addressed                   This Visit's Progress     Diabetes Management        03/28/23  Patient has not been able to call to scheduled diabetes education class as she has been caring for ill . I will resend information over [employer] per her request so she can reach out this week. I will follow up in 2 weeks. VL    03/14/23  Patient appreciated the additional info sent in mail. She was able to review, and does not have any questions. Patient was not able to make diabetes education class yet, but she did ask for # again and I gave that for her to call this week. She is interested in the virtual classes since transportation an issue. I will follow up in 2 weeks. VL    02/23/23  Patient states she did review information on diabetic diet, would like more information on ideas she CAN eat - I will send in mail. Patient is interested in diabetes education classes, referral was placed by providers. Patient will obtain that information in mail and call to schedule classes. I will follow up in 2 weeks. VL    01/27/23  A1C this month was 12. Patient managed by PCP. Patient forgets to take her trulicity - we discussed setting a calendar event for each week on her phone to remind her. She will set this up. Patient is compliant with Lantus. Declined to take Metformin due to side effects which she discuss with provider she states. Patient is non compliant with diabetic diet. I will send information in mail to her to review. Patient was education on compliance necessity to manage her diabetes. We discussed how diabetes can affect your whole body and negative consequences of poor management. I will follow up in 2 weeks.  VL

## 2023-05-02 RX ORDER — BUTALBITAL, ACETAMINOPHEN AND CAFFEINE 50; 325; 40 MG/1; MG/1; MG/1
TABLET ORAL
Qty: 40 TABLET | Refills: 5 | Status: SHIPPED | OUTPATIENT
Start: 2023-05-02

## 2023-05-04 ENCOUNTER — PATIENT OUTREACH (OUTPATIENT)
Dept: CASE MANAGEMENT | Age: 68
End: 2023-05-04

## 2023-05-08 ENCOUNTER — HOSPITAL ENCOUNTER (EMERGENCY)
Facility: HOSPITAL | Age: 68
Discharge: HOME | End: 2023-05-08
Attending: STUDENT IN AN ORGANIZED HEALTH CARE EDUCATION/TRAINING PROGRAM
Payer: MEDICARE

## 2023-05-08 ENCOUNTER — APPOINTMENT (OUTPATIENT)
Facility: HOSPITAL | Age: 68
End: 2023-05-08
Payer: MEDICARE

## 2023-05-08 VITALS
TEMPERATURE: 98 F | DIASTOLIC BLOOD PRESSURE: 76 MMHG | SYSTOLIC BLOOD PRESSURE: 116 MMHG | HEART RATE: 97 BPM | RESPIRATION RATE: 16 BRPM | BODY MASS INDEX: 28.47 KG/M2 | OXYGEN SATURATION: 97 % | WEIGHT: 145 LBS | HEIGHT: 60 IN

## 2023-05-08 DIAGNOSIS — S06.0X0A CONCUSSION WITHOUT LOSS OF CONSCIOUSNESS, INITIAL ENCOUNTER: Primary | ICD-10-CM

## 2023-05-08 PROCEDURE — 90471 IMMUNIZATION ADMIN: CPT | Performed by: STUDENT IN AN ORGANIZED HEALTH CARE EDUCATION/TRAINING PROGRAM

## 2023-05-08 PROCEDURE — 90714 TD VACC NO PRESV 7 YRS+ IM: CPT | Performed by: STUDENT IN AN ORGANIZED HEALTH CARE EDUCATION/TRAINING PROGRAM

## 2023-05-08 PROCEDURE — 6360000002 HC RX W HCPCS: Performed by: STUDENT IN AN ORGANIZED HEALTH CARE EDUCATION/TRAINING PROGRAM

## 2023-05-08 PROCEDURE — 70450 CT HEAD/BRAIN W/O DYE: CPT

## 2023-05-08 PROCEDURE — 99284 EMERGENCY DEPT VISIT MOD MDM: CPT | Performed by: STUDENT IN AN ORGANIZED HEALTH CARE EDUCATION/TRAINING PROGRAM

## 2023-05-08 PROCEDURE — 99284 EMERGENCY DEPT VISIT MOD MDM: CPT

## 2023-05-08 PROCEDURE — 96372 THER/PROPH/DIAG INJ SC/IM: CPT | Performed by: STUDENT IN AN ORGANIZED HEALTH CARE EDUCATION/TRAINING PROGRAM

## 2023-05-08 RX ADMIN — CLOSTRIDIUM TETANI TOXOID ANTIGEN (FORMALDEHYDE INACTIVATED) AND CORYNEBACTERIUM DIPHTHERIAE TOXOID ANTIGEN (FORMALDEHYDE INACTIVATED) 0.5 ML: 5; 2 INJECTION, SUSPENSION INTRAMUSCULAR at 19:17

## 2023-05-08 ASSESSMENT — LIFESTYLE VARIABLES
HOW OFTEN DO YOU HAVE A DRINK CONTAINING ALCOHOL: NEVER
HOW MANY STANDARD DRINKS CONTAINING ALCOHOL DO YOU HAVE ON A TYPICAL DAY: PATIENT DOES NOT DRINK

## 2023-05-08 ASSESSMENT — PAIN - FUNCTIONAL ASSESSMENT: PAIN_FUNCTIONAL_ASSESSMENT: 0-10

## 2023-05-08 ASSESSMENT — PAIN SCALES - GENERAL: PAINLEVEL_OUTOF10: 9

## 2023-05-08 NOTE — ED NOTES
Patient does not appear to be in any acute distress/shows no evidence of clinical instability at this time. Provider has reviewed discharge instructions with the patient/family. The patient/family verbalized understanding instructions as well as need for follow up for any further symptoms. Discharge papers given, education provided, and any questions answered.         Lord Trey RN  05/08/23 [id number]

## 2023-05-08 NOTE — ED TRIAGE NOTES
Pt to ER with c/o headache after a glf while moving a recycle bin and falling and hitting her head on brick. Pt denies loc. Pt took Fioricet prior to arrival to ED.

## 2023-05-14 ASSESSMENT — ENCOUNTER SYMPTOMS: SHORTNESS OF BREATH: 0

## 2023-05-15 NOTE — ED PROVIDER NOTES
[facility] EMERGENCY DEPT  EMERGENCY DEPARTMENT ENCOUNTER      Pt Name: Lorie Dunham  MRN: [id number]  Michellegfgeetha 1955  Date of evaluation: 5/8/2023  Provider: Neva Rod MD    CHIEF COMPLAINT       Chief Complaint   Patient presents with    Fall         HISTORY OF PRESENT ILLNESS   29-year-old female with history of migraines, DM presents to the ED with chief complaint of headache calling ground-level fall. Patient says that she tripped and fell, struck the left side of her head today. She felt fine prior to that. Currently has a mild headache. Denies any loss of conscious. No numbness, weakness, speech difficulty, nausea, vomiting, neck pain. The history is provided by the patient. Review of External Medical Records:     Nursing Notes were reviewed. REVIEW OF SYSTEMS       Review of Systems   Respiratory:  Negative for shortness of breath. Cardiovascular:  Negative for chest pain. Except as noted above the remainder of the review of systems was reviewed and negative.        PAST MEDICAL HISTORY     Past Medical History:   Diagnosis Date    Diabetic peripheral neuropathy associated with type 2 diabetes mellitus (HCC)     Dyslipidemia     Hx of migraines     Hyperlipidemia     Type II or unspecified type diabetes mellitus without mention of complication, uncontrolled     Unspecified epilepsy without mention of intractable epilepsy     Vision decreased          SURGICAL HISTORY       Past Surgical History:   Procedure Laterality Date    CATARACT REMOVAL Bilateral     COLONOSCOPY N/A 1/30/2019    COLONOSCOPY performed by Samy Singh MD at [address] Bilateral     tubaligation    ORTHOPEDIC SURGERY      both knees, L shoulder     REFRACTIVE SURGERY  02/05/2012    right eye    RETINAL DETACHMENT SURGERY Bilateral     UPPER GASTROINTESTINAL ENDOSCOPY  04/2016    EGD, stretched her esopaghus, recurrent issue          CURRENT MEDICATIONS       Discharge

## 2023-05-22 RX ORDER — DIVALPROEX SODIUM 250 MG/1
TABLET, DELAYED RELEASE ORAL
Qty: 180 TABLET | Refills: 1 | Status: SHIPPED | OUTPATIENT
Start: 2023-05-22

## 2023-05-22 RX ORDER — DIVALPROEX SODIUM 250 MG/1
TABLET, DELAYED RELEASE ORAL
Qty: 540 TABLET | OUTPATIENT
Start: 2023-05-22

## 2023-06-05 ENCOUNTER — TELEPHONE (OUTPATIENT)
Age: 68
End: 2023-06-05

## 2023-06-05 NOTE — TELEPHONE ENCOUNTER
Patient has an appointment 6/19/23 but does not have transportation. Stated that her daughter has an appointment with Edna Dietrich the next day and would like to know if she can attend with her daughter? Patient would like to discuss her medication and blood work. Zeb Griffiths will be leaving the state to move to [place] 6/29/23 permanently. Please Contact.

## 2023-06-19 ENCOUNTER — TELEPHONE (OUTPATIENT)
Age: 68
End: 2023-06-19

## 2023-06-19 RX ORDER — BUTALBITAL, ACETAMINOPHEN AND CAFFEINE 50; 325; 40 MG/1; MG/1; MG/1
1 TABLET ORAL EVERY 4 HOURS PRN
Qty: 30 TABLET | Refills: 0 | Status: SHIPPED | OUTPATIENT
Start: 2023-06-19

## 2023-06-21 ENCOUNTER — TELEPHONE (OUTPATIENT)
Age: 68
End: 2023-06-21

## 2023-06-21 DIAGNOSIS — E11.42 DIABETIC PERIPHERAL NEUROPATHY ASSOCIATED WITH TYPE 2 DIABETES MELLITUS (HCC): Primary | ICD-10-CM

## 2023-06-21 RX ORDER — GABAPENTIN 300 MG/1
CAPSULE ORAL
Qty: 90 CAPSULE | Refills: 0 | Status: SHIPPED | OUTPATIENT
Start: 2023-06-21 | End: 2024-05-30

## 2023-06-21 RX ORDER — INSULIN GLARGINE 100 [IU]/ML
75 INJECTION, SOLUTION SUBCUTANEOUS 2 TIMES DAILY
Qty: 15 ADJUSTABLE DOSE PRE-FILLED PEN SYRINGE | Refills: 1 | Status: SHIPPED | OUTPATIENT
Start: 2023-06-21 | End: 2025-04-02

## 2023-06-21 RX ORDER — BUTALBITAL, ACETAMINOPHEN AND CAFFEINE 50; 325; 40 MG/1; MG/1; MG/1
TABLET ORAL
Qty: 60 TABLET | Refills: 0 | Status: SHIPPED | OUTPATIENT
Start: 2023-06-21

## 2023-06-21 NOTE — TELEPHONE ENCOUNTER
----- Message from Emily Duenas sent at 6/21/2023  9:06 AM EDT -----  Subject: Refill Request    QUESTIONS  Name of Medication? insulin glargine (LANTUS SOLOSTAR) 100 UNIT/ML   injection pen  Patient-reported dosage and instructions? 100 units injection pen inject   80 units twice per day  How many days do you have left? 7  Preferred Pharmacy? [id number] #[id number]  Pharmacy phone number (if available)? [phone]  ---------------------------------------------------------------------------  --------------,  Name of Medication? gabapentin (NEURONTIN) 300 MG capsule  Patient-reported dosage and instructions? 300 MG Capsule 1 Capsule by   mouth at night  How many days do you have left? 8  Preferred Pharmacy? [id number] #[id number]  Pharmacy phone number (if available)? [phone]  ---------------------------------------------------------------------------  --------------,  Name of Medication? butalbital-acetaminophen-caffeine (FIORICET, ESGIC)   -40 MG per tablet  Patient-reported dosage and instructions? -40 TB Tablet 1 to 2   Tablets as needed  How many days do you have left? 6  Preferred Pharmacy? [facility] #[id number]  Pharmacy phone number (if available)? [phone]  Additional Information for Provider? The patient is moving out of state on   6/30/2023 and need these medications refilled. ---------------------------------------------------------------------------  --------------  Kishor WHITT  What is the best way for the office to contact you? OK to leave message on   voicemail  Preferred Call Back Phone Number? [phone]  ---------------------------------------------------------------------------  --------------  SCRIPT ANSWERS  Relationship to Patient?  Self

## 2024-04-15 RX ORDER — BLOOD-GLUCOSE METER
KIT MISCELLANEOUS
Qty: 200 STRIP | Refills: 5 | Status: SHIPPED | OUTPATIENT
Start: 2024-04-15

## 2024-04-25 ENCOUNTER — CLINICAL DOCUMENTATION (OUTPATIENT)
Age: 69
End: 2024-04-25

## 2024-05-17 ENCOUNTER — CLINICAL DOCUMENTATION (OUTPATIENT)
Age: 69
End: 2024-05-17

## 2024-05-21 ENCOUNTER — CLINICAL DOCUMENTATION (OUTPATIENT)
Age: 69
End: 2024-05-21

## 2024-05-21 NOTE — PROGRESS NOTES
[facility] diabetic SWO was signed & faxed to [phone],ok,Copy placed in scan folder to be scanned to chart.

## 2024-09-12 ENCOUNTER — CLINICAL DOCUMENTATION (OUTPATIENT)
Age: 69
End: 2024-09-12

## 2024-09-12 ENCOUNTER — TELEPHONE (OUTPATIENT)
Age: 69
End: 2024-09-12

## (undated) DEVICE — POLYP TRAP: Brand: TRAPEASE®

## (undated) DEVICE — KENDALL RADIOLUCENT FOAM MONITORING ELECTRODE -RECTANGULAR SHAPE: Brand: KENDALL

## (undated) DEVICE — ADULT SPO2 SENSOR: Brand: NELLCOR

## (undated) DEVICE — BITEBLOCK ENDOSCP 60FR MAXI WHT POLYETH STURDY W/ VELC WVN

## (undated) DEVICE — CUFF RMFG BP INF SZ 11 DISP -- LAWSON OEM ITEM 238915

## (undated) DEVICE — FORCEPS BX L240CM JAW DIA2.8MM L CAP W/ NDL MIC MESH TOOTH

## (undated) DEVICE — SNARE ENDOSCP M L240CM W27MM SHTH DIA2.4MM CHN 2.8MM OVL

## (undated) DEVICE — CONTAINER SPEC 20 ML LID NEUT BUFF FORMALIN 10 % POLYPR STS

## (undated) DEVICE — ESOPHAGEAL BALLOON DILATATION CATHETER: Brand: CRE FIXED WIRE

## (undated) DEVICE — 1200 GUARD II KIT W/5MM TUBE W/O VAC TUBE: Brand: GUARDIAN

## (undated) DEVICE — BAG BELONG PT PERS CLEAR HANDL

## (undated) DEVICE — CANNULA CUSH AD W/ 14FT TBG

## (undated) DEVICE — BAG SPEC BIOHZRD 10 X 10 IN --

## (undated) DEVICE — Device

## (undated) DEVICE — CATH IV AUTOGRD BC BLU 22GA 25 -- INSYTE

## (undated) DEVICE — KIT COLON W/ 1.1OZ LUB AND 2 END

## (undated) DEVICE — SOLIDIFIER MEDC 1200ML -- CONVERT TO 356117